# Patient Record
Sex: FEMALE | Race: BLACK OR AFRICAN AMERICAN | Employment: UNEMPLOYED | URBAN - METROPOLITAN AREA
[De-identification: names, ages, dates, MRNs, and addresses within clinical notes are randomized per-mention and may not be internally consistent; named-entity substitution may affect disease eponyms.]

---

## 2017-06-15 ENCOUNTER — HOSPITAL ENCOUNTER (OUTPATIENT)
Dept: PHYSICAL THERAPY | Age: 52
Discharge: HOME | End: 2017-06-15
Payer: MEDICAID

## 2017-06-15 PROCEDURE — 97162 PT EVAL MOD COMPLEX 30 MIN: CPT | Performed by: PHYSICAL THERAPIST

## 2017-06-15 PROCEDURE — 97110 THERAPEUTIC EXERCISES: CPT | Performed by: PHYSICAL THERAPIST

## 2017-06-15 NOTE — PROGRESS NOTES
PT DAILY TREATMENT NOTE     Patient Name: Oriana Covarrubias  Date:6/15/2017  : 1965  [x]  Patient  Verified  Payor: BLUE CROSS MEDICAID / Plan: [facility] [employer] HEALTHKEEPERS PLUS / Product Type: Managed Care Medicaid /    In time:8:00  Out time:9:00  Total Treatment Time (min): 60  Visit #: 1 of 12    Treatment Area: Bilateral shoulder pain [M25.511, M25.512]    SUBJECTIVE  Pain Level (0-10 scale): 6 left shoulder and hip, 3/10 right shoulder   Any medication changes, allergies to medications, adverse drug reactions, diagnosis change, or new procedure performed?: [x] No    [] Yes (see summary sheet for update)  Subjective functional status/changes:   [] No changes reported  I can not sleep at times if the shoulder and hip pain is acting up. It hurts to lie on that arm.      OBJECTIVE    Modality rationale: decrease inflammation, decrease pain and increase tissue extensibility to improve the patients ability to improve mobility    Min Type Additional Details    [] Estim:  []Unatt       []IFC  []Premod                        []Other:  []w/ice   []w/heat  Position:  Location:    [] Estim: []Att    []TENS instruct  []NMES                    []Other:  []w/US   []w/ice   []w/heat  Position:  Location:    []  Traction: [] Cervical       []Lumbar                       [] Prone          []Supine                       []Intermittent   []Continuous Lbs:  [] before manual  [] after manual    []  Ultrasound: []Continuous   [] Pulsed                           []1MHz   []3MHz W/cm2:  Location:    []  Iontophoresis with dexamethasone         Location: [] Take home patch   [] In clinic   10 []  Ice     [x]  heat  []  Ice massage  []  Laser   []  Anodyne Position: seated up incline Location: left shoulder     []  Laser with stim  []  Other:  Position:  Location:    []  Vasopneumatic Device Pressure:       [] lo [] med [] hi   Temperature: [] lo [] med [] hi   [x] Skin assessment post-treatment:  [x]intact [x]redness- no adverse reaction    []redness  adverse reaction:     40 min [x]Eval                  []Re-Eval       10 min Therapeutic Exercise:  [x] See flow sheet :HEP performed and issued this date for shoulders and hip. Rationale: increase ROM, increase strength, improve coordination, improve balance and increase proprioception to improve the patients ability to improve mobility              With   [x] TE   [] TA   [] neuro   [] other: Patient Education: [x] Review HEP    [x] Progressed/Changed HEP based on:   [] positioning   [] body mechanics   [] transfers   [] heat/ice application    [] other:      Other Objective/Functional Measures:  See POC      Pain Level (0-10 scale) post treatment: 2    ASSESSMENT/Changes in Function: see POC    Patient will continue to benefit from skilled PT services to modify and progress therapeutic interventions, address functional mobility deficits, address ROM deficits, address strength deficits, analyze and address soft tissue restrictions, analyze and cue movement patterns, analyze and modify body mechanics/ergonomics, assess and modify postural abnormalities and address imbalance/dizziness to attain remaining goals. []  See Plan of Care  []  See progress note/recertification  []  See Discharge Summary         Progress towards goals / Updated goals:  See POC    PLAN  [x]  Upgrade activities as tolerated     [x]  Continue plan of care  []  Update interventions per flow sheet       []  Discharge due to:_  [x]  Other:_FOTO performed for shoulders and hip.        Obey Chan PT 6/15/2017  9:00 AM    Future Appointments  Date Time Provider Noah Gregory   6/19/2017 10:30 AM Zac Fontenot [facility]   6/21/2017 8:30 AM Zac Fontenot [facility]   6/23/2017 10:30 AM Zac Po [facility]   6/26/2017 10:30 AM Zac Po, [facility]   6/28/2017 1:30 PM Obey Chan PT [facility]   6/30/2017 11:30 AM Bryon Newsome [facility]   7/3/2017 1:00 PM Shailesh Branch PT [facility]   7/5/2017 9:00 AM Chanel Thomas [facility]   7/7/2017 11:00 AM Chanel Thomas [facility]   7/10/2017 10:30 AM Chanel Thomas [facility]   7/12/2017 10:00 AM Chanel Thomas [facility]   7/14/2017 10:30 AM Dory Arnold PT [facility]

## 2017-06-15 NOTE — PROGRESS NOTES
[facility]  [address] [place], [address] [place]  Ph [phone]  Fx [phone]    Plan of Care/ Statement of Necessity for Physical Therapy Services    Patient name: Donnie Sunshine Start of Care: 6/15/2017   Referral source:  Elias Aguilar NP  : 1965    Medical Diagnosis: Bilateral shoulder pain [M25.511, M25.512], Right hip pain. Onset Date:chronic OA, several years, increased several months. Treatment Diagnosis: boby shoulder pain and decreased ROM, weakness. Right hip pain and difficult walking. Prior Hospitalization: see medical history Provider#: [id number]   Medications: Verified on Patient summary List    Comorbidities: HTN, Obesity, Arthritis, Parathyroid, CHF, Osteoporosis, Sleep apnea, GERD. Prior Level of Function: Pt I with all ADL's and IADL's. The Plan of Care and following information is based on the information from the initial evaluation. Assessment/ key information: Pt is a pleasant 45 yo AAF with c/c of boby shoulder and right hip pain. Pt has hx of moderate arthritis that is chronic and is managed with exercise and medication. Pt currently reported limited Left UE and Right HIP AROM with increased report of pain that limits her sleeping tolerance and ability to perform daily tasks. Pt states pain increases to 6-7/10 at worst and 4/10 at best with pain medication. Pt has limited Right shoulder elevation to 165 degrees and left to 135 degrees. Pt has 3+/5 left UE MMT and 4/5 right UE MMT. Pt has full right LE AROM with MMT grossly 4/5 in right hip due to pain. Pt has s/s consistent with arthritis as well as decreased soft and joint tissue mobility. Pt has moderate TTP to right hip and boby shoulder soft tissue with multiple trigger points. Pt will benefit from skilled PT services to address the findings and to improve ADL function and sleep quality.     Evaluation Complexity History MEDIUM  Complexity : 1-2 comorbidities / personal factors will impact the outcome/ POC ; Examination MEDIUM Complexity : 3 Standardized tests and measures addressing body structure, function, activity limitation and / or participation in recreation  ;Presentation MEDIUM Complexity : Evolving with changing characteristics  ; Clinical Decision Making HIGH Complexity : FOTO score of 1- 25   Overall Complexity Rating: MEDIUM  Problem List: pain affecting function, decrease ROM, decrease strength, edema affecting function, impaired gait/ balance, decrease ADL/ functional abilitiies, decrease activity tolerance, decrease flexibility/ joint mobility and decrease transfer abilities   Treatment Plan may include any combination of the following: Therapeutic exercise, Therapeutic activities, Neuromuscular re-education, Physical agent/modality, Gait/balance training, Manual therapy, Aquatic therapy, Patient education, Self Care training, Functional mobility training, Home safety training and Stair training  Patient / Family readiness to learn indicated by: asking questions, trying to perform skills and interest  Persons(s) to be included in education: patient (P)  Barriers to Learning/Limitations: None  Patient Goal (s): To get motion back and have less pain   Patient Self Reported Health Status: good  Rehabilitation Potential: good    Short Term Goals: To be accomplished in 2 weeks:   1) Pt will report > or = to 4/10 pain in boby UE  to improve quality of sleeping.    @ Worst 6-7/10    2) Pt will report > or = to 3/10 pain in Right hip pain to improve quality of sleeping.    @ Worst 6/10    3) Pt will be issued a HEP to allow for carryover b/w therapy sessions. @ Eval: HEP issued with cues for safety. Long Term Goals:  To be accomplished in 4 weeks:   1) Pt will report > or = to 0-1/10 pain in boby UE  to improve quality of sleeping.    @ Worst 6-7/10    2) Pt will report > or = to 0-1/10 pain in Right hip pain to improve quality of sleeping.    @ Worst 6/10    3) Pt will have 4+/5 strength in boby UE to allow for improved tolerance for ADL function. @ Eval: Right UE grossly 4/5, left UE grossly 3+-4-/5    4) Pt will have increased right hip strength to 5/5 to promote community ambulation sans difficulty. @ Eval: Right hip MMT 4/5   5) Pt will have improved FOTO score for shoulders by 10-15 points to show improved mobility .    @ Eval: 45/100   6) 4) Pt will have improved FOTO score for hip by 8-10 points to show improved mobility.    @ Eval: 55/100    Frequency / Duration: Patient to be seen 3 times per week for 4 weeks. Patient/ Caregiver education and instruction: Diagnosis, prognosis, self care, activity modification, brace/ splint application and exercises   [x]  Plan of care has been reviewed with YASEMIN Coronado, PT 6/15/2017 9:03 AM    ________________________________________________________________________    I certify that the above Therapy Services are being furnished while the patient is under my care. I agree with the treatment plan and certify that this therapy is necessary.     Physician's Signature:____________________  Date:____________Time: _________    Please sign and return to [facility]  [address] [place], [address]  Ph [phone]  Fx [phone]

## 2017-06-19 ENCOUNTER — HOSPITAL ENCOUNTER (OUTPATIENT)
Dept: PHYSICAL THERAPY | Age: 52
Discharge: HOME | End: 2017-06-19
Payer: MEDICAID

## 2017-06-19 NOTE — PROGRESS NOTES
PT DAILY TREATMENT NOTE - [facility]     Patient Name: Korey Olson  Date:2017  : 1965  [x]  Patient  Verified  Payor: BLUE CROSS MEDICAID / [facility] Montey Schirmer / Product Type: Managed Care Medicaid /    In time:1030  Out time:1110  Total Treatment Time (min): 40  Total Timed Codes (min): 40  1:1 Treatment Time (MC only): na   Visit #: 2 of 12    Treatment Area: Bilateral shoulder pain [M25.511, M25.512]    SUBJECTIVE  Pain Level (0-10 scale): 8 R UE,   Any medication changes, allergies to medications, adverse drug reactions, diagnosis change, or new procedure performed?: [x] No    [] Yes (see summary sheet for update)  Subjective functional status/changes:   [] No changes reported  Active with HEP, no changes in pain levels    OBJECTIVE    40 min Therapeutic Exercise:  [] See flow sheet :   Rationale: increase ROM, increase strength and improve coordination          With   [] TE   [] TA   [] neuro   [] other: Patient Education: [x] Review HEP    [] Progressed/Changed HEP based on:   [] positioning   [] body mechanics   [] transfers   [] heat/ice application    [] other:      Other Objective/Functional Measures:   Shoulder hiking with L UE during abd     Pain Level (0-10 scale) post treatment: 8 L SH, 4 R sh    ASSESSMENT/Changes in Function:   Showed good mobility with ROM exercises. No increase of current pain with TE. Patient will continue to benefit from skilled PT services to modify and progress therapeutic interventions, address functional mobility deficits, address ROM deficits and address strength deficits to attain remaining goals. [x]  See Plan of Care  []  See progress note/recertification  []  See Discharge Summary         Progress towards goals / Updated goals:  Short Term Goals:  To be accomplished in 2 weeks:  1) Pt will report > or = to 4/10 pain in boby UE to improve quality of sleeping.   @ Worst 6-7/10   Current: no changes    2) Pt will report > or = to 3/10 pain in Right hip pain to improve quality of sleeping.   @ Worst 6/10   Current: no changes    3) Pt will be issued a HEP to allow for carryover b/w therapy sessions. @ Eval: HEP issued with cues for safety.    Current: reports compliance    Long Term Goals: To be accomplished in 4 weeks:  1) Pt will report > or = to 0-1/10 pain in boby UE to improve quality of sleeping.   @ Worst 6-7/10   Current: no changes    2) Pt will report > or = to 0-1/10 pain in Right hip pain to improve quality of sleeping.   @ Worst 6/10   Current: no changes    3) Pt will have 4+/5 strength in boby UE to allow for improved tolerance for ADL function. @ Eval: Right UE grossly 4/5, left UE grossly 3+-4-/5   Current: no changes    4) Pt will have increased right hip strength to 5/5 to promote community ambulation sans difficulty.    @ Eval: Right hip MMT 4/5  Current: no changes    5) Pt will have improved FOTO score for shoulders by 10-15 points to show improved mobility .   @ Eval: 45/100  Current: no changes    6) 4) Pt will have improved FOTO score for hip by 8-10 points to show improved mobility.   @ Eval: 55/100  Current: no changes    PLAN  [x]  Upgrade activities as tolerated     [x]  Continue plan of care  []  Update interventions per flow sheet       []  Discharge due to:_  []  Other:_      Sally Cortez PTA 6/19/2017  10:43 AM    Future Appointments  Date Time Provider Noah Gregory   6/21/2017 8:30 AM Sally Cortez [facility]   6/23/2017 10:30 AM Sally Cortez PTA [facility]   6/26/2017 10:30 AM Sally Cortez PTA [facility]   6/28/2017 1:30 PM Marcella Manzano PT [facility]   6/30/2017 11:30 AM Marcella Manzano PT [facility]   7/3/2017 1:00 PM Marcella Manzano PT [facility]   7/5/2017 9:00 AM Sally Cortez [facility]   7/7/2017 11:00 AM Sally Cortez [facility]   7/10/2017 10:30 AM Sally Cortez [facility]   7/12/2017 10:00 AM Sally Cortez [facility]   7/14/2017 10:30 AM Emanuel Cedillo PT [facility]

## 2017-06-21 ENCOUNTER — HOSPITAL ENCOUNTER (OUTPATIENT)
Dept: PHYSICAL THERAPY | Age: 52
Discharge: HOME | End: 2017-06-21
Payer: MEDICAID

## 2017-06-21 PROCEDURE — 97110 THERAPEUTIC EXERCISES: CPT

## 2017-06-21 NOTE — PROGRESS NOTES
PT DAILY TREATMENT NOTE - [facility]     Patient Name: Shawn Atkinson  Date:2017  : 1965  [x]  Patient  Verified  Payor: BLUE CROSS MEDICAID / Plan: [facility] Tiffany Forman / Product Type: Managed Care Medicaid /    In time:825  Out time:915  Total Treatment Time (min): 50  Total Timed Codes (min): 50  1:1 Treatment Time (MC only): na   Visit #: 3 of 12    Treatment Area: Bilateral shoulder pain [M25.511, M25.512]    SUBJECTIVE  Pain Level (0-10 scale): 0 R, 8 L  Any medication changes, allergies to medications, adverse drug reactions, diagnosis change, or new procedure performed?: [x] No    [] Yes (see summary sheet for update)  Subjective functional status/changes:   [] No changes reported  Reports no change on pain when sleeping, \"only increases if Iay on my L side\"    OBJECTIVE      50 min Therapeutic Exercise:  [] See flow sheet :   Rationale: increase ROM, increase strength and improve coordination    With   [] TE   [] TA   [] neuro   [] other: Patient Education: [x] Review HEP    [] Progressed/Changed HEP based on:   [] positioning   [] body mechanics   [] transfers   [] heat/ice application    [] other:      Other Objective/Functional Measures:   No increase of pain with TE. Strength deficit greater L UE compared to R. Difficulty with resisted flexion       Pain Level (0-10 scale) post treatment: 8 L, 0 R Sh    ASSESSMENT/Changes in Function:   Min cuing for form, some compensatory movements with L UE flexion    Patient will continue to benefit from skilled PT services to modify and progress therapeutic interventions, address functional mobility deficits, address ROM deficits and address strength deficits to attain remaining goals. []  See Plan of Care  []  See progress note/recertification  []  See Discharge Summary         Progress towards goals / Updated goals:  Short Term Goals:  To be accomplished in 2 weeks:  1) Pt will report > or = to 4/10 pain in boby UE to improve quality of sleeping.   @ Worst 6-7/10   Current:0 R, 6-7 L     2) Pt will report > or = to 3/10 pain in Right hip pain to improve quality of sleeping.   @ Worst 6/10   Current: no changes     3) Pt will be issued a HEP to allow for carryover b/w therapy sessions. @ Eval: HEP issued with cues for safety.    Current: reports compliance     Long Term Goals: To be accomplished in 4 weeks:  1) Pt will report > or = to 0-1/10 pain in boby UE to improve quality of sleeping.   @ Worst 6-7/10   Current: no changes     2) Pt will report > or = to 0-1/10 pain in Right hip pain to improve quality of sleeping.   @ Worst 6/10   Current: no changes     3) Pt will have 4+/5 strength in boby UE to allow for improved tolerance for ADL function. @ Eval: Right UE grossly 4/5, left UE grossly 3+-4-/5   Current: no changes     4) Pt will have increased right hip strength to 5/5 to promote community ambulation sans difficulty.    @ Eval: Right hip MMT 4/5  Current: no changes     5) Pt will have improved FOTO score for shoulders by 10-15 points to show improved mobility .   @ Eval: 45/100  Current: no changes     6) 4) Pt will have improved FOTO score for hip by 8-10 points to show improved mobility.   @ Eval: 55/100  Current: no changes    PLAN  [x]  Upgrade activities as tolerated     []  Continue plan of care  []  Update interventions per flow sheet       []  Discharge due to:_  []  Other:_      Awa Kumar PTA 6/21/2017  8:49 AM    Future Appointments  Date Time Provider Noah Gregory   6/23/2017 10:30 AM Awa Kumar PTA [facility]   6/26/2017 10:30 AM Awa Kumar PTA [facility]   6/28/2017 1:30 PM Valentino Carson, PT [facility]   6/30/2017 11:30 AM Valentino Carson, PT [facility]   7/3/2017 1:00 PM Valentino Carson, PT [facility]   7/5/2017 9:00 AM Norval Bidding, [facility]   7/7/2017 11:00 AM Norval Bidding, [facility]   7/10/2017 10:30 AM Norval Bidding, [facility]   7/12/2017 10:00 AM Norval Bidding, PTA [facility]   7/14/2017 10:30 AM Scott Benitez PT [facility]

## 2017-06-23 ENCOUNTER — HOSPITAL ENCOUNTER (OUTPATIENT)
Dept: PHYSICAL THERAPY | Age: 52
Discharge: HOME | End: 2017-06-23
Payer: MEDICAID

## 2017-06-23 PROCEDURE — 97110 THERAPEUTIC EXERCISES: CPT

## 2017-06-23 NOTE — PROGRESS NOTES
PT DAILY TREATMENT NOTE - [facility]     Patient Name: Enrico Rivera  Date:2017  : 1965  [x]  Patient  Verified  Payor: BLUE CROSS MEDICAID / Plan: [facility] [employer]ERS [employer] / Product Type: Managed Care Medicaid /    In time:1030  Out time:1110  Total Treatment Time (min): 40  Total Timed Codes (min): 40  1:1 Treatment Time (MC only): na   Visit #: 4 of 12    Treatment Area: Bilateral shoulder pain [M25.511, M25.512]    SUBJECTIVE  Pain Level (0-10 scale): 6 L SH, 0 R  Any medication changes, allergies to medications, adverse drug reactions, diagnosis change, or new procedure performed?: [x] No    [] Yes (see summary sheet for update)  Subjective functional status/changes:   [] No changes reported  Reports improvement I symptoms, no hip or R sh pain, deficits remain in L SH    OBJECTIVE    40 min Therapeutic Exercise:  [] See flow sheet :   Rationale: increase ROM, increase strength and improve coordination          With   [] TE   [] TA   [] neuro   [] other: Patient Education: [x] Review HEP    [] Progressed/Changed HEP based on:   [] positioning   [] body mechanics   [] transfers   [] heat/ice application    [] other:      Other Objective/Functional Measures:   Decrease in L sh pain today. No hip pain. Pain Level (0-10 scale) post treatment: 6 L Sh     ASSESSMENT/Changes in Function:   No increase of shoulder pain with TE, compensatory movements at end range resisted flex on L UE    Patient will continue to benefit from skilled PT services to modify and progress therapeutic interventions, address functional mobility deficits, address ROM deficits and address strength deficits to attain remaining goals.      [x]  See Plan of Care  []  See progress note/recertification  []  See Discharge Summary         Progress towards goals / Updated goals:  Short Term Goals: To be accomplished in 2 weeks:  1) Pt will report > or = to 4/10 pain in boby UE to improve quality of sleeping.   @ Worst 6-7/10   Current:0 R, 6-7 L      2) Pt will report > or = to 3/10 pain in Right hip pain to improve quality of sleeping.   @ Worst 6/10   Current:0/10 c/o of ache      3) Pt will be issued a HEP to allow for carryover b/w therapy sessions. @ Eval: HEP issued with cues for safety.    Current: reports compliance      Long Term Goals: To be accomplished in 4 weeks:  1) Pt will report > or = to 0-1/10 pain in boby UE to improve quality of sleeping.   @ Worst 6-7/10   Current: no changes      2) Pt will report > or = to 0-1/10 pain in Right hip pain to improve quality of sleeping.   @ Worst 6/10   Current: :0/10 c/o of ache        3) Pt will have 4+/5 strength in boby UE to allow for improved tolerance for ADL function. @ Eval: Right UE grossly 4/5, left UE grossly 3+-4-/5   Current: progressing per TE, deficits in L UE      4) Pt will have increased right hip strength to 5/5 to promote community ambulation sans difficulty.    @ Eval: Right hip MMT 4/5  Current: no changes      5) Pt will have improved FOTO score for shoulders by 10-15 points to show improved mobility .   @ Eval: 45/100  Current: no changes      6) 4) Pt will have improved FOTO score for hip by 8-10 points to show improved mobility.   @ Eval: 55/100  Current: no changes    PLAN  [x]  Upgrade activities as tolerated     [x]  Continue plan of care  []  Update interventions per flow sheet       []  Discharge due to:_  []  Other:_      Chris Hopson PTA 6/23/2017  10:42 AM    Future Appointments  Date Time Provider Noah Gregory   6/26/2017 10:30 AM Chris Hopson PTA [facility]   6/28/2017 1:30 PM Vicky Soriano PT [facility]   6/30/2017 11:30 AM Vicky Soriano PT [facility]   7/3/2017 1:00 PM Vicky Soriano PT [facility]   7/5/2017 9:00 AM Chris Hopson PTA [facility]   7/7/2017 11:00 AM Chris Hopson PTA [facility]   7/10/2017 10:30 AM Chris Hopson PTA [facility]   7/12/2017 10:00 AM Chris Hopson PTA [facility]   7/14/2017 10:30 AM Darcie Al, PT [facility]

## 2017-06-26 ENCOUNTER — HOSPITAL ENCOUNTER (OUTPATIENT)
Dept: PHYSICAL THERAPY | Age: 52
Discharge: HOME | End: 2017-06-26
Payer: MEDICAID

## 2017-06-26 PROCEDURE — 97110 THERAPEUTIC EXERCISES: CPT

## 2017-06-26 NOTE — PROGRESS NOTES
PT DAILY TREATMENT NOTE - [facility]     Patient Name: Oriana Covarrubias  Date:2017  : 1965  [x]  Patient  Verified  Payor: BLUE CROSS MEDICAID / Plan: [facility] HEALTHKEEPERS PLUS / Product Type: Managed Care Medicaid /    In time:1030  Out time:1125  Total Treatment Time (min): 55  Total Timed Codes (min): 55  1:1 Treatment Time ( [address] only): na   Visit #: 5 of 12    Treatment Area: Bilateral shoulder pain [M25.511, M25.512]    SUBJECTIVE  Pain Level (0-10 scale): 4-5/10   Any medication changes, allergies to medications, adverse drug reactions, diagnosis change, or new procedure performed?: [x] No    [] Yes (see summary sheet for update)  Subjective functional status/changes:   [] No changes reported  Pain decreasing in L shoulder    OBJECTIVE    55 min Therapeutic Exercise:  [] See flow sheet :   Rationale: increase ROM, increase strength and improve coordination            With   [x] TE   [] TA   [] neuro   [] other: Patient Education: [x] Review HEP    [x] Progressed/Changed HEP based on: sh 6way [] positioning   [] body mechanics   [] transfers   [] heat/ice application    [] other:      Other Objective/Functional Measures:   57/100 sh  54/100 hip     Pain Level (0-10 scale) post treatment: 4-5 L sh    ASSESSMENT/Changes in Function:   Improved ability with sh 6way with TB. Decreased sh pain with movement. Patient will continue to benefit from skilled PT services to modify and progress therapeutic interventions, address functional mobility deficits, address ROM deficits and address strength deficits to attain remaining goals.      [x]  See Plan of Care  []  See progress note/recertification  []  See Discharge Summary         Progress towards goals / Updated goals:  Short Term Goals: To be accomplished in 2 weeks:  1) Pt will report > or = to 4/10 pain in boby UE to improve quality of sleeping.   @ Worst 6-7/10   Current:0 R, 6-7 L      2) Pt will report > or = to 3/10 pain in Right hip pain to improve quality of sleeping.   @ Worst 6/10   Current:0/10 c/o of ache      3) Pt will be issued a HEP to allow for carryover b/w therapy sessions. @ Eval: HEP issued with cues for safety.    Current: reports compliance      Long Term Goals: To be accomplished in 4 weeks:  1) Pt will report > or = to 0-1/10 pain in boby UE to improve quality of sleeping.   @ Worst 6-7/10   Current: no changes      2) Pt will report > or = to 0-1/10 pain in Right hip pain to improve quality of sleeping.   @ Worst 6/10   Current: :0/10 c/o of ache         3) Pt will have 4+/5 strength in boby UE to allow for improved tolerance for ADL function. @ Eval: Right UE grossly 4/5, left UE grossly 3+-4-/5   Current: progressing per TE, deficits in L UE      4) Pt will have increased right hip strength to 5/5 to promote community ambulation sans difficulty.    @ Eval: Right hip MMT 4/5  Current: no changes      5) Pt will have improved FOTO score for shoulders by 10-15 points to show improved mobility .   @ Eval: 45/100  Current: 57/100      6) 4) Pt will have improved FOTO score for hip by 8-10 points to show improved mobility.   @ Eval: 55/100  Current: 54/100    PLAN  [x]  Upgrade activities as tolerated     [x]  Continue plan of care  []  Update interventions per flow sheet       []  Discharge due to:_  []  Other:_      Jaylin Trotter PTA 6/26/2017  11:06 AM    Future Appointments  Date Time Provider Noah Gregory   6/28/2017 2:00 PM Elizabeth Patterson PT [facility]   6/30/2017 11:30 AM Elizabeth Patterson PT [facility]   7/3/2017 1:00 PM Elizabeth Patterson PT [facility]   7/5/2017 9:00 AM Jaylin Trotter PTA [facility]   7/7/2017 11:00 AM Jaylin Trotter PTA [facility]   7/10/2017 10:30 AM Jaylin Dyers, PTA [facility]   7/12/2017 10:00 AM Jaylin Trotter PTA [facility]   7/14/2017 10:30 AM Darcie Al PT [facility]

## 2017-06-28 ENCOUNTER — HOSPITAL ENCOUNTER (OUTPATIENT)
Dept: PHYSICAL THERAPY | Age: 52
End: 2017-06-28
Payer: MEDICAID

## 2017-06-30 ENCOUNTER — HOSPITAL ENCOUNTER (OUTPATIENT)
Dept: PHYSICAL THERAPY | Age: 52
Discharge: HOME | End: 2017-06-30
Payer: MEDICAID

## 2017-06-30 PROCEDURE — 97110 THERAPEUTIC EXERCISES: CPT

## 2017-06-30 NOTE — PROGRESS NOTES
PT DAILY TREATMENT NOTE - [facility]     Patient Name: Marie Leyva  Date:2017  : 1965  [x]  Patient  Verified  Payor: BLUE CROSS MEDICAID / Plan: [facility] [employer] HEALTHKEEPERS PLUS / Product Type: Managed Care Medicaid /    In time:1115  Out time:1200  Total Treatment Time (min): 45  Total Timed Codes (min): 45  1:1 Treatment Time ([facility] only): na   Visit #: 6 of 12    Treatment Area: Bilateral shoulder pain [M25.511, M25.512]    SUBJECTIVE  Pain Level (0-10 scale): 0  Any medication changes, allergies to medications, adverse drug reactions, diagnosis change, or new procedure performed?: [x] No    [] Yes (see summary sheet for update)  Subjective functional status/changes:   [] No changes reported  Reports being active with updated HEP, no pain in sh or hips    OBJECTIVE      45 min Therapeutic Exercise:  [] See flow sheet :   Rationale: increase ROM, increase strength and improve coordination           With   [] TE   [] TA   [] neuro   [] other: Patient Education: [x] Review HEP    [] Progressed/Changed HEP based on:   [] positioning   [] body mechanics   [] transfers   [] heat/ice application    [] other:      Other Objective/Functional Measures:   none     Pain Level (0-10 scale) post treatment: 0    ASSESSMENT/Changes in Function:   Increased ease with shoulder 6way, decreased compensation    Patient will continue to benefit from skilled PT services to modify and progress therapeutic interventions, address functional mobility deficits, address ROM deficits and address strength deficits to attain remaining goals.      [x]  See Plan of Care  []  See progress note/recertification  []  See Discharge Summary         Progress towards goals / Updated goals:  Short Term Goals: To be accomplished in 2 weeks:  1) Pt will report > or = to 4/10 pain in boby UE to improve quality of sleeping.   @ Worst 6-7/10   Current:0 R, 0/10 17      2) Pt will report > or = to 3/10 pain in Right hip pain to improve quality of sleeping.   @ Worst 6/10   Current:0/10 c/o of ache      3) Pt will be issued a HEP to allow for carryover b/w therapy sessions. @ Eval: HEP issued with cues for safety.    Current: reports compliance      Long Term Goals: To be accomplished in 4 weeks:  1) Pt will report > or = to 0-1/10 pain in boby UE to improve quality of sleeping.   @ Worst 6-7/10   Current: no changes      2) Pt will report > or = to 0-1/10 pain in Right hip pain to improve quality of sleeping.   @ Worst 6/10   Current: :0/10 c/o of ache          3) Pt will have 4+/5 strength in boby UE to allow for improved tolerance for ADL function. @ Eval: Right UE grossly 4/5, left UE grossly 3+-4-/5   Current: progressing per TE, deficits in L UE      4) Pt will have increased right hip strength to 5/5 to promote community ambulation sans difficulty.    @ Eval: Right hip MMT 4/5  Current: no changes      5) Pt will have improved FOTO score for shoulders by 10-15 points to show improved mobility .   @ Eval: 45/100  Current: 57/100      6) 4) Pt will have improved FOTO score for hip by 8-10 points to show improved mobility.   @ Eval: 55/100  Current: 54/100       PLAN  [x]  Upgrade activities as tolerated     [x]  Continue plan of care  []  Update interventions per flow sheet       []  Discharge due to:_  []  Other:_      Nicky Read PTA 6/30/2017  11:22 AM    Future Appointments  Date Time Provider Noah Gregory   6/30/2017 11:30 AM Nicky Read PTA [facility]   7/3/2017 1:00 PM Kermit Garcia PT [facility]   7/5/2017 9:00 AM Nicky Read PTA [facility]   7/7/2017 11:00 AM Nicky Read PTA [facility]   7/10/2017 10:30 AM Nicky Read [facility]   7/12/2017 10:00 AM Nicky Read PTA [facility]   7/14/2017 10:30 AM Karlee Yu PT [facility]

## 2017-07-03 ENCOUNTER — APPOINTMENT (OUTPATIENT)
Dept: PHYSICAL THERAPY | Age: 52
End: 2017-07-03
Payer: MEDICAID

## 2017-07-05 ENCOUNTER — HOSPITAL ENCOUNTER (OUTPATIENT)
Dept: PHYSICAL THERAPY | Age: 52
Discharge: HOME | End: 2017-07-05
Payer: MEDICAID

## 2017-07-05 PROCEDURE — 97110 THERAPEUTIC EXERCISES: CPT

## 2017-07-05 NOTE — PROGRESS NOTES
PT DAILY TREATMENT NOTE - [facility]     Patient Name: Eufemia Herrera  Date:2017  : 1965  [x]  Patient  Verified  Payor: BLUE CROSS MEDICAID / Plan: [facility] [employer] HEALTHKEEPERS PLUS / Product Type: Managed Care Medicaid /    In time:900  Out time:942  Total Treatment Time (min): 42  Total Timed Codes (min): 42  1:1 Treatment Time ([facility] only): na   Visit #: 7 of 12    Treatment Area: Bilateral shoulder pain [M25.511, M25.512]    SUBJECTIVE  Pain Level (0-10 scale): 0  Any medication changes, allergies to medications, adverse drug reactions, diagnosis change, or new procedure performed?: [x] No    [] Yes (see summary sheet for update)  Subjective functional status/changes:   [] No changes reported  Active with HEP, no pain, only c/o aching     OBJECTIVE      40 min Therapeutic Exercise:  [] See flow sheet :   Rationale: increase ROM, increase strength and improve coordination             With   [x] TE   [] TA   [] neuro   [] other: Patient Education: [x] Review HEP    [x] Progressed/Changed HEP based on: B LE strengthening  [] positioning   [] body mechanics   [] transfers   [] heat/ice application    [] other:      Other Objective/Functional Measures:   R UE MMT  5/5 all planes of motion  L UE MMT  ABD/Flex/ER 4+/5  IR 5/5     Pain Level (0-10 scale) post treatment: 0    ASSESSMENT/Changes in Function:   Progressing very well with alleviation of pain and improved L UE strength      Patient will continue to benefit from skilled PT services to modify and progress therapeutic interventions, address functional mobility deficits, address ROM deficits and address strength deficits to attain remaining goals.      [x]  See Plan of Care  []  See progress note/recertification  []  See Discharge Summary         Progress towards goals / Updated goals:  Short Term Goals: To be accomplished in 2 weeks:  1) Pt will report > or = to 4/10 pain in boby UE to improve quality of sleeping.   @ Worst 6-7/10 Current:0/10      2) Pt will report > or = to 3/10 pain in Right hip pain to improve quality of sleeping.   @ Worst 6/10   Current:0/10 c/o of ache      3) Pt will be issued a HEP to allow for carryover b/w therapy sessions. @ Eval: HEP issued with cues for safety.    Current: reports compliance      Long Term Goals: To be accomplished in 4 weeks:  1) Pt will report > or = to 0-1/10 pain in boby UE to improve quality of sleeping.   @ Worst 6-7/10   Current: only has pain if lying on L arm, otherwise 0/10      2) Pt will report > or = to 0-1/10 pain in Right hip pain to improve quality of sleeping.   @ Worst 6/10   Current: :0/10 c/o of ache          3) Pt will have 4+/5 strength in boby UE to allow for improved tolerance for ADL function. @ Eval: Right UE grossly 4/5, left UE grossly 3+-4-/5   Current: R UE MMT  5/5 all planes of motion  L UE MMT  ABD/Flex/ER 4+/5  IR 5/5         4) Pt will have increased right hip strength to 5/5 to promote community ambulation sans difficulty.    @ Eval: Right hip MMT 4/5  Current: no changes      5) Pt will have improved FOTO score for shoulders by 10-15 points to show improved mobility .   @ Eval: 45/100  Current: 57/100      6) 4) Pt will have improved FOTO score for hip by 8-10 points to show improved mobility.   @ Eval: 55/100  Current: 54/100    PLAN  [x]  Upgrade activities as tolerated     [x]  Continue plan of care  []  Update interventions per flow sheet       []  Discharge due to:_  []  Other:_      Alejandra Richmond PTA 7/5/2017  9:15 AM    Future Appointments  Date Time Provider Noah Gregory   7/7/2017 11:00 AM Alejandra Richmond [facility]   7/10/2017 10:30 AM Alejandra Richmond PTA [facility]   7/12/2017 10:00 AM Alejandra Richmond PTA [facility]   7/14/2017 10:30 AM Shelli Link, PT [facility]

## 2017-07-07 ENCOUNTER — HOSPITAL ENCOUNTER (OUTPATIENT)
Dept: PHYSICAL THERAPY | Age: 52
Discharge: HOME | End: 2017-07-07
Payer: MEDICAID

## 2017-07-07 PROCEDURE — 97110 THERAPEUTIC EXERCISES: CPT

## 2017-07-07 NOTE — PROGRESS NOTES
PT DAILY TREATMENT NOTE - [facility]     Patient Name: Harman Gorman  Date:2017  : 1965  [x]  Patient  Verified  Payor: BLUE CROSS MEDICAID / Plan: [facility] [employer] HEALTHKEEPERS PLUS / Product Type: Managed Care Medicaid /    In time:11  Out time:1138  Total Treatment Time (min): 38  Total Timed Codes (min): 38  1:1 Treatment Time ([facility] only): na   Visit #: 8 of 12    Treatment Area: Bilateral shoulder pain [M25.511, M25.512]    SUBJECTIVE  Pain Level (0-10 scale): 0  Any medication changes, allergies to medications, adverse drug reactions, diagnosis change, or new procedure performed?: [x] No    [] Yes (see summary sheet for update)  Subjective functional status/changes:   [] No changes reported  Feeling good with no pain. Active with HEP    OBJECTIVE    38 min Therapeutic Exercise:  [] See flow sheet :   Rationale: increase ROM, increase strength and improve coordination          With   [] TE   [] TA   [] neuro   [] other: Patient Education: [x] Review HEP    [] Progressed/Changed HEP based on:   [] positioning   [] body mechanics   [] transfers   [] heat/ice application    [] other:      Other Objective/Functional Measures:   R hip MMT  flex 4+/5  Abd/add 5/5  Ext 4+/5    L hip MMT  Flex 4+/5  Abd/add 5/5  Ext 4+/5       Pain Level (0-10 scale) post treatment: 0    ASSESSMENT/Changes in Function:   Good increase of RE hip strength above 4/5 in all planes of motion    Patient will continue to benefit from skilled PT services to modify and progress therapeutic interventions, address functional mobility deficits, address ROM deficits and address strength deficits to attain remaining goals.      [x]  See Plan of Care  []  See progress note/recertification  []  See Discharge Summary         Progress towards goals / Updated goals:  Short Term Goals: To be accomplished in 2 weeks:  1) Pt will report > or = to 4/10 pain in boby UE to improve quality of sleeping.   @ Worst 6-7/10   Current:0/10      2) Pt will report > or = to 3/10 pain in Right hip pain to improve quality of sleeping.   @ Worst 6/10   Current:0/10 c/o of ache      3) Pt will be issued a HEP to allow for carryover b/w therapy sessions. @ Eval: HEP issued with cues for safety.    Current: reports compliance      Long Term Goals: To be accomplished in 4 weeks:  1) Pt will report > or = to 0-1/10 pain in boby UE to improve quality of sleeping.   @ Worst 6-7/10   Current: only has pain if lying on L arm, otherwise 0/10      2) Pt will report > or = to 0-1/10 pain in Right hip pain to improve quality of sleeping.   @ Worst 6/10   Current: :0/10 c/o of ache          3) Pt will have 4+/5 strength in boby UE to allow for improved tolerance for ADL function. @ Eval: Right UE grossly 4/5, left UE grossly 3+-4-/5   Current:   R UE MMT  5/5 all planes of motion  L UE MMT  ABD/Flex/ER 4+/5  IR 5/5                      4) Pt will have increased right hip strength to 5/5 to promote community ambulation sans difficulty.    @ Eval: Right hip MMT 4/5  Current:   R hip MMT  flex 4+/5  Abd/add 5/5  Ext 4+/5    L hip MMT  Flex 4+/5  Abd/add 5/5  Ext 4+/5        5) Pt will have improved FOTO score for shoulders by 10-15 points to show improved mobility .   @ Eval: 45/100  Current: 57/100      6) 4) Pt will have improved FOTO score for hip by 8-10 points to show improved mobility.   @ Eval: 55/100  Current: 54/100       PLAN  [x]  Upgrade activities as tolerated     [x]  Continue plan of care  []  Update interventions per flow sheet       []  Discharge due to:_  []  Other:_      Ekaterina Bloom PTA 7/7/2017  11:01 AM    Future Appointments  Date Time Provider Noah Gregory   7/10/2017 10:30 AM Ekaterina Bloom PTA [facility]   7/12/2017 10:00 AM Ekaterina Bloom PTA [facility]   7/14/2017 10:30 AM Etelvina Mcfarland PT [facility]

## 2017-07-10 ENCOUNTER — HOSPITAL ENCOUNTER (OUTPATIENT)
Dept: PHYSICAL THERAPY | Age: 52
Discharge: HOME | End: 2017-07-10
Payer: MEDICAID

## 2017-07-10 PROCEDURE — 97110 THERAPEUTIC EXERCISES: CPT

## 2017-07-10 NOTE — PROGRESS NOTES
PT DAILY TREATMENT NOTE - [facility]     Patient Name: Robert Raphael  Date:7/10/2017  : 1965  [x]  Patient  Verified  Payor: BLUE CROSS MEDICAID / Plan: [facility] [employer] HEALTHKEEPERS PLUS / Product Type: Managed Care Medicaid /    In time:1030  Out time:1118  Total Treatment Time (min): 48  Total Timed Codes (min): 48  1:1 Treatment Time ([facility] only): na   Visit #: 9 of 12    Treatment Area: Bilateral shoulder pain [M25.511, M25.512]    SUBJECTIVE  Pain Level (0-10 scale): 0  Any medication changes, allergies to medications, adverse drug reactions, diagnosis change, or new procedure performed?: [x] No    [] Yes (see summary sheet for update)  Subjective functional status/changes:   [] No changes reported  Feeling good with no shoulder or hip pain    OBJECTIVE        48 min Therapeutic Exercise:  [] See flow sheet :   Rationale: increase ROM, increase strength and improve coordination            With   [] TE   [] TA   [] neuro   [] other: Patient Education: [x] Review HEP    [] Progressed/Changed HEP based on:   [] positioning   [] body mechanics   [] transfers   [] heat/ice application    [] other:      Other Objective/Functional Measures:    See below. Pain Level (0-10 scale) post treatment: 0    ASSESSMENT/Changes in Function:   Pt has met strength goal for Shoulder    Patient will continue to benefit from skilled PT services to modify and progress therapeutic interventions, address functional mobility deficits, address ROM deficits and address strength deficits to attain remaining goals.      [x]  See Plan of Care  []  See progress note/recertification  []  See Discharge Summary         Progress towards goals / Updated goals:  Short Term Goals: To be accomplished in 2 weeks:  1) Pt will report > or = to 4/10 pain in boby UE to improve quality of sleeping.   @ Worst 6-7/10   Current:0/10, MET      2) Pt will report > or = to 3/10 pain in Right hip pain to improve quality of sleeping.   @ Worst 6/10 Current:0/10 c/o of ache, MET      3) Pt will be issued a HEP to allow for carryover b/w therapy sessions. @ Eval: HEP issued with cues for safety.   Jayla Fournier: reports compliance      Long Term Goals: To be accomplished in 4 weeks:  1) Pt will report > or = to 0-1/10 pain in boby UE to improve quality of sleeping.   @ Worst 6-7/10   Current: only has pain if lying on L arm, otherwise 0/10, MET      2) Pt will report > or = to 0-1/10 pain in Right hip pain to improve quality of sleeping.   @ Worst 6/10   Current: :0/10 c/o of ache, MET          3) Pt will have 4+/5 strength in boby UE to allow for improved tolerance for ADL function. @ Eval: Right UE grossly 4/5, left UE grossly 3+-4-/5   Current:   R UE MMT  5/5 all planes of motion  L UE MMT  ABD/Flex/ER 4+/5  IR 5/5                       4) Pt will have increased right hip strength to 5/5 to promote community ambulation sans difficulty.    @ Eval: Right hip MMT 4/5  Current:   R hip MMT  flex 4+/5  Abd/add 5/5  Ext 4+/5     L hip MMT  Flex 4+/5  Abd/add 5/5  Ext 4+/5         5) Pt will have improved FOTO score for shoulders by 10-15 points to show improved mobility .   @ Eval: 45/100  Current: 57/100      6) 4) Pt will have improved FOTO score for hip by 8-10 points to show improved mobility.   @ Eval: 55/100  Current: 54/100          PLAN  [x]  Upgrade activities as tolerated     [x]  Continue plan of care  []  Update interventions per flow sheet       []  Discharge due to:_  []  Other:_      Ekaterina Bloom PTA 7/10/2017  10:44 AM    Future Appointments  Date Time Provider Noah Gregory   7/12/2017 10:00 AM Ekaterina Bloom PTA [facility]   7/14/2017 10:30 AM Etelvina Mcfraland PT [facility]

## 2017-07-12 ENCOUNTER — HOSPITAL ENCOUNTER (OUTPATIENT)
Dept: PHYSICAL THERAPY | Age: 52
Discharge: HOME | End: 2017-07-12
Payer: MEDICAID

## 2017-07-12 PROCEDURE — 97164 PT RE-EVAL EST PLAN CARE: CPT | Performed by: PHYSICAL THERAPIST

## 2017-07-12 PROCEDURE — 97110 THERAPEUTIC EXERCISES: CPT | Performed by: PHYSICAL THERAPIST

## 2017-07-12 NOTE — PROGRESS NOTES
PT DAILY TREATMENT NOTE     Patient Name: Abdon Fischer  Date:2017  : 1965  [x]  Patient  Verified  Payor: BLUE CROSS MEDICAID / Plan: [facility] [employer] [place] / Product Type: Managed Care Medicaid /    In time:10:03  Out time:11:10  Total Treatment Time (min): 55  Visit #: 10 of 12    Treatment Area: Bilateral shoulder pain [M25.511, M25.512]    SUBJECTIVE  Pain Level (0-10 scale): 0-3  Any medication changes, allergies to medications, adverse drug reactions, diagnosis change, or new procedure performed?: [x] No    [] Yes (see summary sheet for update)  Subjective functional status/changes:   [] No changes reported  I have improved ~ 70-80%. I am not 100% yet.  The worst pain is about 3/10     OBJECTIVE    Modality rationale: none   Min Type Additional Details    [] Estim:  []Unatt       []IFC  []Premod                        []Other:  []w/ice   []w/heat  Position:  Location:    [] Estim: []Att    []TENS instruct  []NMES                    []Other:  []w/US   []w/ice   []w/heat  Position:  Location:    []  Traction: [] Cervical       []Lumbar                       [] Prone          []Supine                       []Intermittent   []Continuous Lbs:  [] before manual  [] after manual    []  Ultrasound: []Continuous   [] Pulsed                           []1MHz   []3MHz W/cm2:  Location:    []  Iontophoresis with dexamethasone         Location: [] Take home patch   [] In clinic    []  Ice     []  heat  []  Ice massage  []  Laser   []  Anodyne Position:  Location:    []  Laser with stim  []  Other:  Position:  Location:    []  Vasopneumatic Device Pressure:       [] lo [] med [] hi   Temperature: [] lo [] med [] hi   [] Skin assessment post-treatment:  []intact []redness- no adverse reaction    []redness  adverse reaction:     10 min []Eval                  [x]Re-Eval       45 min Therapeutic Exercise:  [x] See flow sheet :   Rationale: increase ROM, increase strength and improve coordination to improve the patients ability to improve mobility           With   [x] TE   [] TA   [] neuro   [] other: Patient Education: [x] Review HEP    [x] Progressed/Changed HEP based on:   [] positioning   [] body mechanics   [] transfers   [] heat/ice application    [] other:      Other Objective/Functional Measures:   Angel Shoulder AROM WFL with MMT 4+-5-/5 with flexion and ABD all others 5/5  Right hip AROM WFL with MMT 4+/5    FOTO: 69/100 hip      73/100 shoulder     Pain Level (0-10 scale) post treatment: 2-3/10 post assessment aft 5 min rest 0/10 on departure. ASSESSMENT/Changes in Function: see PN     Patient will continue to benefit from skilled PT services to modify and progress therapeutic interventions, address functional mobility deficits, address ROM deficits, address strength deficits, analyze and address soft tissue restrictions, analyze and cue movement patterns, analyze and modify body mechanics/ergonomics and assess and modify postural abnormalities to attain remaining goals. []  See Plan of Care  [x]  See progress note/recertification  []  See Discharge Summary         Progress towards goals / Updated goals:  See PN    PLAN  [x]  Upgrade activities as tolerated     [x]  Continue plan of care  []  Update interventions per flow sheet       []  Discharge due to:_  [x]  Other:_continue 2 x 4 weeks.         Lyn Acuna PT 7/12/2017  11:15 AM    Future Appointments  Date Time Provider Noah Gregory   7/14/2017 10:30 AM Etelvina Mcfarland, PT [facility]

## 2017-07-14 ENCOUNTER — HOSPITAL ENCOUNTER (OUTPATIENT)
Dept: PHYSICAL THERAPY | Age: 52
Discharge: HOME | End: 2017-07-14
Payer: MEDICAID

## 2017-07-14 PROCEDURE — 97110 THERAPEUTIC EXERCISES: CPT | Performed by: PHYSICAL THERAPIST

## 2017-07-14 NOTE — PROGRESS NOTES
PT DAILY TREATMENT NOTE     Patient Name: Al Banks  Date:2017  : 1965  [x]  Patient  Verified  Payor: BLUE CROSS MEDICAID / Plan: [facility] Adilene Salazar / Product Type: Managed Care Medicaid /    In IJKK:[id number]  Out time:1120  Total Treatment Time (min): 47  Visit #: 11 of 18    Treatment Area: Bilateral shoulder pain [M25.511, M25.512]    SUBJECTIVE  Pain Level (0-10 scale): 0  Any medication changes, allergies to medications, adverse drug reactions, diagnosis change, or new procedure performed?: [x] No    [] Yes (see summary sheet for update)  Subjective functional status/changes:   [] No changes reported  Pt reports progress   Pt states now able to lift left arm without using her opp arm to lift , now able to also put arm behind back which she could do prior ,   Pt able to lift dishes up and put them into cupboards with more reps/endurnace and less pain   Pt able to lift 5month old baby and carry him short distances   For hip pt doing HEP uses blue tband for 4 way and now able to lie on right hip with no pain     OBJECTIVE      45 min Therapeutic Exercise:  [x] See flow sheet :   Rationale: increase ROM, increase strength and improve coordination to improve the patients ability to improve mobility           With   [] TE   [] TA   [] neuro   [] other: Patient Education: [x] Review HEP    [] Progressed/Changed HEP based on:   [] positioning   [] body mechanics   [] transfers   [] heat/ice application    [] other:      Other Objective/Functional Measures: note accessory motion with Tband ex especially FE on left decr control , pt with forward shoulder posture      Pain Level (0-10 scale) post treatment: 0    ASSESSMENT/Changes in Function: cues for shoulders down and back , and quatlity of motion , decr accessory motions especially on the left with FE with tband     Patient will continue to benefit from skilled PT services to modify and progress therapeutic interventions, address functional mobility deficits, address ROM deficits, address strength deficits, analyze and address soft tissue restrictions, analyze and cue movement patterns, analyze and modify body mechanics/ergonomics and assess and modify postural abnormalities to attain remaining goals. [x]  See Plan of Care  []  See progress note/recertification  []  See Discharge Summary         Progress towards goals / Updated goals:      Long Term Goals: To be accomplished in 4 weeks:  1) Pt will report > or = to 0-1/10 pain in boby UE to improve quality of sleeping.   @ Worst 6-7/10   Current: not met, 0-3/10 at worst for both areas.       2) Pt will report > or = to 0-1/10 pain in Right hip pain to improve quality of sleeping.   @ Worst 6/10   Current:not met, 0-3/10 c/o of ache                    3) Pt will have increased right hip strength to 5/5 to promote community ambulation sans difficulty. @ Eval: Right hip MMT 4/5  Current: not met,   R hip MMT  flex 4+/5  Abd/add 5/5  Ext 4+/5      L hip MMT  Flex 4+/5  Abd/add 5/5  Ext 4+/5    4) Revised goal. PT will have Left UE MMT grossly 5/5 throughout to promote return to PLOF.                          @ re-assess: Right UE MMT 5/5 all planes of motion  Left UE MMT ABD/Flex/ER 4+/5 IR 5/5     PLAN  [x]  Upgrade activities as tolerated     [x]  Continue plan of care  []  Update interventions per flow sheet       []  Discharge due to:_  []  Other:_      Fahad Larios, PT 7/14/2017  11:01 AM    No future appointments.

## 2017-07-17 ENCOUNTER — HOSPITAL ENCOUNTER (OUTPATIENT)
Dept: PHYSICAL THERAPY | Age: 52
Discharge: HOME | End: 2017-07-17
Payer: MEDICAID

## 2017-07-17 PROCEDURE — 97110 THERAPEUTIC EXERCISES: CPT

## 2017-07-17 NOTE — PROGRESS NOTES
PT DAILY TREATMENT NOTE - [facility]     Patient Name: Al Banks  Date:2017  : 1965  [x]  Patient  Verified  Payor: BLUE CROSS MEDICAID / Plan: [facility] Adilene Salazar / Product Type: Managed Care Medicaid /    In time:107  Out time:145  Total Treatment Time (min): 38  Total Timed Codes (min): 38  1:1 Treatment Time (MC only): na   Visit #: 12 of 18    Treatment Area: Bilateral shoulder pain [M25.511, M25.512]    SUBJECTIVE  Pain Level (0-10 scale): 0  Any medication changes, allergies to medications, adverse drug reactions, diagnosis change, or new procedure performed?: [x] No    [] Yes (see summary sheet for update)  Subjective functional status/changes:   [] No changes reported  Feeling good with no symptoms. Some residual weakness in L UE remains    OBJECTIVE      35 min Therapeutic Exercise:  [] See flow sheet :   Rationale: increase ROM, increase strength and improve coordination      With   [] TE   [] TA   [] neuro   [] other: Patient Education: [x] Review HEP    [] Progressed/Changed HEP based on:   [] positioning   [] body mechanics   [] transfers   [] heat/ice application    [] other:      Other Objective/Functional Measures:   none     Pain Level (0-10 scale) post treatment: 0    ASSESSMENT/Changes in Function:   No increased pain with advanced TE. Patient will continue to benefit from skilled PT services to modify and progress therapeutic interventions, address functional mobility deficits, address ROM deficits and address strength deficits to attain remaining goals.      [x]  See Plan of Care  []  See progress note/recertification  []  See Discharge Summary         Progress towards goals / Updated goals:  Long Term Goals: To be accomplished in 4 weeks:  1) Pt will report > or = to 0-1/10 pain in boby UE to improve quality of sleeping.   @ Worst 6-7/10   Current: not met, 0-3/10 at worst for both areas.       2) Pt will report > or = to 0-1/10 pain in Right hip pain to improve quality of sleeping.   @ Worst 6/10   Current:not met, 0-3/10 c/o of ache         3) Pt will have increased right hip strength to 5/5 to promote community ambulation sans difficulty. @ Eval: Right hip MMT 4/5  Current: not met,   R hip MMT  flex 4+/5  Abd/add 5/5  Ext 4+/5      L hip MMT  Flex 4+/5  Abd/add 5/5  Ext 4+/5     4) Revised goal. PT will have Left UE MMT grossly 5/5 throughout to promote return to PLOF.                           @ re-assess: Right UE MMT 5/5 all planes of motion  Left UE MMT ABD/Flex/ER 4+/5 IR 5/5     PLAN  [x]  Upgrade activities as tolerated     [x]  Continue plan of care  []  Update interventions per flow sheet       []  Discharge due to:_  []  Other:_      Thanh Feldman PTA 7/17/2017  1:57 PM    Future Appointments  Date Time Provider Noah Gregory   7/24/2017 9:30 AM Thanh Feldman PTA [facility]   7/26/2017 9:00 AM Thanh Feldman PTA [facility]   7/31/2017 10:30 AM Mushtaq Barber PT [facility]   8/2/2017 11:30 AM Mushtaq Barber PT [facility]   8/7/2017 8:30 AM Mushtaq Barber PT [facility]   8/9/2017 11:30 AM Mushtaq Barber PT [facility]

## 2017-07-24 ENCOUNTER — HOSPITAL ENCOUNTER (OUTPATIENT)
Dept: PHYSICAL THERAPY | Age: 52
Discharge: HOME | End: 2017-07-24
Payer: MEDICAID

## 2017-07-24 PROCEDURE — 97110 THERAPEUTIC EXERCISES: CPT

## 2017-07-24 NOTE — PROGRESS NOTES
PT DAILY TREATMENT NOTE - [facility]     Patient Name: Farheen Whitehead  Date:2017  : 1965  [x]  Patient  Verified  Payor: BLUE CROSS MEDICAID / Plan: [facility] [employer] [facility] Adilene Rife / Product Type: Managed Care Medicaid /    In time:930  Out time:1010  Total Treatment Time (min): 40  Total Timed Codes (min): 49  1:1 Treatment Time (MC only): na  Visit #: 13 of 18    Treatment Area: Bilateral shoulder pain [M25.511, M25.512]    SUBJECTIVE  Pain Level (0-10 scale): 0  Any medication changes, allergies to medications, adverse drug reactions, diagnosis change, or new procedure performed?: [x] No    [] Yes (see summary sheet for update)  Subjective functional status/changes:   [] No changes reported  No chanel n with  ADL's, reports no functional limitations at this time    OBJECTIVE        40 min Therapeutic Exercise:  [] See flow sheet :   Rationale: increase ROM, increase strength, improve coordination and improve balance           With   [] TE   [] TA   [] neuro   [] other: Patient Education: [x] Review HEP    [] Progressed/Changed HEP based on:   [] positioning   [] body mechanics   [] transfers   [] heat/ice application    [] other:      Other Objective/Functional Measures:   none     Pain Level (0-10 scale) post treatment: 0    ASSESSMENT/Changes in Function:   Pt presents with no functional imitations at this time. Patient will continue to benefit from skilled PT services to modify and progress therapeutic interventions, address functional mobility deficits, address ROM deficits and analyze and address soft tissue restrictions to attain remaining goals.      [x]  See Plan of Care  []  See progress note/recertification  []  See Discharge Summary         Progress towards goals / Updated goals:  Long Term Goals: To be accomplished in 4 weeks:  1) Pt will report > or = to 0-1/10 pain in boby UE to improve quality of sleeping.   @ Worst 6-7/10   Current: not met, 0-3/10 at worst for both areas.       2) Pt will report > or = to 0-1/10 pain in Right hip pain to improve quality of sleeping.   @ Worst 6/10   Current:not met, 0-3/10 c/o of ache         3) Pt will have increased right hip strength to 5/5 to promote community ambulation sans difficulty. @ Eval: Right hip MMT 4/5  Current: not met,   R hip MMT  flex 4+/5  Abd/add 5/5  Ext 4+/5      L hip MMT  Flex 4+/5  Abd/add 5/5  Ext 4+/5      4) Revised goal. PT will have Left UE MMT grossly 5/5 throughout to promote return to PLOF.                           @ re-assess: Right UE MMT 5/5 all planes of motion  Left UE MMT ABD/Flex/ER 4+/5 IR 5/5     PLAN  [x]  Upgrade activities as tolerated     [x]  Continue plan of care  []  Update interventions per flow sheet       []  Discharge due to:_  []  Other:_      Bismark Calderon PTA 7/24/2017  10:26 AM    Future Appointments  Date Time Provider Noah Gregory   7/26/2017 9:00 AM Bismark Calderon PTA [facility]   7/31/2017 10:30 AM Carley Nageotte, PT [facility]   8/2/2017 11:30 AM Carley Nageotte, PT [facility]   8/7/2017 8:30 AM Carley Nageotte, PT [facility]   8/9/2017 11:30 AM Carley Nageotte, PT [facility]

## 2017-07-26 ENCOUNTER — HOSPITAL ENCOUNTER (OUTPATIENT)
Dept: PHYSICAL THERAPY | Age: 52
Discharge: HOME | End: 2017-07-26
Payer: MEDICAID

## 2017-07-26 PROCEDURE — 97110 THERAPEUTIC EXERCISES: CPT

## 2017-07-26 NOTE — PROGRESS NOTES
PT DAILY TREATMENT NOTE - [facility]     Patient Name: Daja Doherty  Date:2017  : 1965  [x]  Patient  Verified  Payor: BLUE CROSS MEDICAID / Plan: [facility] CROSS Akhil Riddles / Product Type: Managed Care Medicaid /    In time:903  Out time:950  Total Treatment Time (min): 47  Total Timed Codes (min): 47  1:1 Treatment Time ( only): na   Visit #: 14 of 18    Treatment Area: Bilateral shoulder pain [M25.511, M25.512]    SUBJECTIVE  Pain Level (0-10 scale): 0  Any medication changes, allergies to medications, adverse drug reactions, diagnosis change, or new procedure performed?: [x] No    [] Yes (see summary sheet for update)  Subjective functional status/changes:     [x] No changes reported      OBJECTIVE    47 min Therapeutic Exercise:  [] See flow sheet :   Rationale: increase ROM, increase strength and improve coordination    With   [] TE   [] TA   [] neuro   [] other: Patient Education: [x] Review HEP    [] Progressed/Changed HEP based on:   [] positioning   [] body mechanics   [] transfers   [] heat/ice application    [] other:      Other Objective/Functional Measures:   none    Pain Level (0-10 scale) post treatment: 0    ASSESSMENT/Changes in Function:   No functional limitations at this time. R LE strength equal to L LE    Patient will continue to benefit from skilled PT services to modify and progress therapeutic interventions, address functional mobility deficits, address ROM deficits and address strength deficits to attain remaining goals.      [x]  See Plan of Care  []  See progress note/recertification  []  See Discharge Summary         Progress towards goals / Updated goals:  Long Term Goals: To be accomplished in 4 weeks:  1) Pt will report > or = to 0-1/10 pain in boby UE to improve quality of sleeping.   @ Worst 6-7/10   Current: not met, 0-1/10 at worst for both areas.       2) Pt will report > or = to 0-1/10 pain in Right hip pain to improve quality of sleeping.   @ Worst 6/10   Current: MET 0-1/10       3) Pt will have increased right hip strength to 5/5 to promote community ambulation sans difficulty. @ Eval: Right hip MMT 4/5  Current: not met,   R hip MMT  flex 4+/5  Abd/add 5/5  Ext 4+/5      L hip MMT  Flex 4+/5  Abd/add 5/5  Ext 4+/5      4) Revised goal. PT will have Left UE MMT grossly 5/5 throughout to promote return to PLOF.                           @ re-assess: Right UE MMT 5/5 all planes of motion  Left UE MMT ABD/Flex/ER 4+/5 IR 5/5     PLAN  [x]  Upgrade activities as tolerated     [x]  Continue plan of care  []  Update interventions per flow sheet       []  Discharge due to:_  []  Other:_      Miguel Nunez, YASEMIN 7/26/2017  9:09 AM    Future Appointments  Date Time Provider Noah Gregory   7/31/2017 10:30 AM Jarrell Schneider PT [facility]   8/2/2017 11:30 AM Jarrell Schneider PT [facility]   8/7/2017 8:30 AM Jarrell Schneider PT [facility]   8/9/2017 11:30 AM Jarrell Schneider PT [facility]

## 2017-07-31 ENCOUNTER — HOSPITAL ENCOUNTER (OUTPATIENT)
Dept: PHYSICAL THERAPY | Age: 52
Discharge: HOME | End: 2017-07-31
Payer: MEDICAID

## 2017-07-31 PROCEDURE — 97110 THERAPEUTIC EXERCISES: CPT | Performed by: PHYSICAL THERAPIST

## 2017-07-31 NOTE — PROGRESS NOTES
PT DAILY TREATMENT NOTE     Patient Name: Deloris Riedel  Date:2017  : 1965  [x]  Patient  Verified  Payor: BLUE CROSS MEDICAID / Plan: [facility] [employer] / Product Type: Managed Care Medicaid /    In time:1037  Out time:1116  Total Treatment Time (min): 39  Visit #: 15 of 18    Treatment Area: Bilateral shoulder pain [M25.511, M25.512]    SUBJECTIVE  Pain Level (0-10 scale): 0 shoulders and hip , right knee sore 6/10   Any medication changes, allergies to medications, adverse drug reactions, diagnosis change, or new procedure performed?: [x] No    [] Yes (see summary sheet for update)  Subjective functional status/changes:   [] No changes reported  Pt noting more endurance with activity , such as putting groceries away ,also easier to get up from a lower chair     OBJECTIVE     39 min Therapeutic Exercise:  [x] See flow sheet :   Rationale: increase ROM, increase strength and improve coordination to improve the patients ability to improve mobility           With   [] TE   [] TA   [] neuro   [] other: Patient Education: [x] Review HEP    [] Progressed/Changed HEP based on:   [] positioning   [] body mechanics   [] transfers   [] heat/ice application    [] other:      Other Objective/Functional Measures: FOTO :  75    Pain Level (0-10 scale) post treatment: 0    ASSESSMENT/Changes in Function: pt tolerating progression increase resist and weight with ex , increase hold with squat , cues with posture     Patient will continue to benefit from skilled PT services to modify and progress therapeutic interventions, address functional mobility deficits, address ROM deficits, address strength deficits, analyze and address soft tissue restrictions, analyze and cue movement patterns, analyze and modify body mechanics/ergonomics and assess and modify postural abnormalities to attain remaining goals.      [x]  See Plan of Care  []  See progress note/recertification  []  See Discharge Summary         Progress towards goals / Updated goals:  Long Term Goals: To be accomplished in 4 weeks:  1) Pt will report > or = to 0-1/10 pain in boby UE to improve quality of sleeping.   @ Worst 6-7/10   Current: not met, 0-2/10 at worst for both areas.       2) Pt will report > or = to 0-1/10 pain in Right hip pain to improve quality of sleeping.   @ Worst 6/10   Current: MET 0-1/10       3) Pt will have increased right hip strength to 5/5 to promote community ambulation sans difficulty. @ Eval: Right hip MMT 4/5  Current: not met,   R hip MMT  flex 4+/5  Abd/add 5/5  Ext 4+/5      L hip MMT  Flex 4+/5  Abd/add 5/5  Ext 4+/5      4) Revised goal. PT will have Left UE MMT grossly 5/5 throughout to promote return to PLOF.                           @ re-assess: Right UE MMT 5/5 all planes of motion  Left UE MMT ABD/Flex/ER 4+/5 IR 5/5     PLAN  [x]  Upgrade activities as tolerated     [x]  Continue plan of care  []  Update interventions per flow sheet       []  Discharge due to:_  []  Other:_      Pleasant Payment, PT 7/31/2017  10:47 AM    Future Appointments  Date Time Provider Naoh Gregory   8/2/2017 11:30 AM Pleasant Payment, PT [facility]   8/7/2017 8:30 AM Pleasant Payment, PT [facility]   8/9/2017 11:30 AM Pleasant Payment, PT [facility]

## 2017-08-02 ENCOUNTER — HOSPITAL ENCOUNTER (OUTPATIENT)
Dept: PHYSICAL THERAPY | Age: 52
Discharge: HOME | End: 2017-08-02
Payer: MEDICAID

## 2017-08-02 PROCEDURE — 97110 THERAPEUTIC EXERCISES: CPT | Performed by: PHYSICAL THERAPIST

## 2017-08-02 NOTE — PROGRESS NOTES
PT DAILY TREATMENT NOTE 12    Patient Name: Harsha Cotto  Date:2017  : 1965  [x]  Patient  Verified  Payor: BLUE CROSS MEDICAID / Plan: [facility] CROSS Ta Jones / Product Type: Managed Care Medicaid /    In time:11:30  Out time: 12:20  Total Treatment Time (min): 50  Visit #: 16 of 18    Treatment Area: Bilateral shoulder pain [M25.511, M25.512]    SUBJECTIVE  Pain Level (0-10 scale): 0  Any medication changes, allergies to medications, adverse drug reactions, diagnosis change, or new procedure performed?: [x] No    [] Yes (see summary sheet for update)  Subjective functional status/changes:   [] No changes reported  Overall function improving. OBJECTIVE    Modality rationale: decrease inflammation, decrease pain and increase tissue extensibility to improve the patients ability to improve mobility    Min Type Additional Details    [] Estim:  []Unatt       []IFC  []Premod                        []Other:  []w/ice   []w/heat  Position:  Location:    [] Estim: []Att    []TENS instruct  []NMES                    []Other:  []w/US   []w/ice   []w/heat  Position:  Location:    []  Traction: [] Cervical       []Lumbar                       [] Prone          []Supine                       []Intermittent   []Continuous Lbs:  [] before manual  [] after manual    []  Ultrasound: []Continuous   [] Pulsed                           []1MHz   []3MHz W/cm2:  Location:    []  Iontophoresis with dexamethasone         Location: [] Take home patch   [] In clinic    []  Ice     []  heat  []  Ice massage  []  Laser   []  Anodyne Position:  Location:    []  Laser with stim  []  Other:  Position:  Location:    []  Vasopneumatic Device Pressure:       [] lo [] med [] hi   Temperature: [] lo [] med [] hi   [] Skin assessment post-treatment:  []intact []redness- no adverse reaction    []redness  adverse reaction:       50 min Therapeutic Exercise:  [x] See flow sheet :UE and LE.     Rationale: increase ROM, increase strength, improve coordination, improve balance and increase proprioception to improve the patients ability to improve mobility           With   [x] TE   [] TA   [] neuro   [] other: Patient Education: [x] Review HEP    [x] Progressed/Changed HEP based on:   [] positioning   [] body mechanics   [] transfers   [] heat/ice application    [] other: reviewed d/c and gym progression. Other Objective/Functional Measures: Ronald UE AROM WFL, Left shoulder       Pain Level (0-10 scale) post treatment: 0    ASSESSMENT/Changes in Function: pt progressing towards d/c with HEP. Pt still reports UE pain and left shoulder weakness, however motion and strength allows for tolerance with ADL's. Pt is I with HEP this date. Patient will continue to benefit from skilled PT services to modify and progress therapeutic interventions, address functional mobility deficits, address ROM deficits, address strength deficits, analyze and address soft tissue restrictions, analyze and cue movement patterns, analyze and modify body mechanics/ergonomics, assess and modify postural abnormalities, address imbalance/dizziness and instruct in home and community integration to attain remaining goals. []  See Plan of Care  []  See progress note/recertification  []  See Discharge Summary         Progress towards goals / Updated goals:  Long Term Goals: To be accomplished in 4 weeks:  1) Pt will report > or = to 0-1/10 pain in ronald UE to improve quality of sleeping.   @ Worst 6-7/10   Current: met, 0/10  No pain        2) Pt will report > or = to 0-1/10 pain in Right hip pain to improve quality of sleeping.   @ Worst 6/10   Current: MET 0-1/10       3) Pt will have increased right hip strength to 5/5 to promote community ambulation sans difficulty.    @ Eval: Right hip MMT 4/5  Current: not met   R hip MMT  flex 4+/5  Abd/add 5/5  Ext 4+/5     L hip MMT  Flex 4+/5  Abd/add 5/5  Ext 4+/5      4) Revised goal. PT will have Left UE MMT grossly 5/5 throughout to promote return to PLOF.   @ Re-assess: Right UE MMT 5/5 all planes of motion  Left UE MMT ABD/Flex/ER 4+/5 IR 5/5        PLAN  [x]  Upgrade activities as tolerated     [x]  Continue plan of care  []  Update interventions per flow sheet       []  Discharge due to:_  []  Other:_      Sagar Zhong PT 8/2/2017  11:40 AM    Future Appointments  Date Time Provider Noah Gregory   8/7/2017 8:30 AM Lea Lopez, PT [facility]   8/9/2017 11:30 AM Lea Lopez PT [facility]

## 2017-08-07 ENCOUNTER — HOSPITAL ENCOUNTER (OUTPATIENT)
Dept: PHYSICAL THERAPY | Age: 52
Discharge: HOME | End: 2017-08-07
Payer: MEDICAID

## 2017-08-07 PROCEDURE — 97110 THERAPEUTIC EXERCISES: CPT | Performed by: PHYSICAL THERAPIST

## 2017-08-07 NOTE — PROGRESS NOTES
PT DAILY TREATMENT NOTE     Patient Name: Michelle Ballesteros  Date:2017  : 1965  [x]  Patient  Verified  Payor: BLUE CROSS MEDICAID / Plan: [facility] [employer] PLUS / Product Type: Managed Care Medicaid /    In [id number]  Out OAQI:[id number]  Total Treatment Time (min): 38  Visit #: 17 of 18    Treatment Area: Bilateral shoulder pain [M25.511, M25.512]    SUBJECTIVE  Pain Level (0-10 scale): 0  Any medication changes, allergies to medications, adverse drug reactions, diagnosis change, or new procedure performed?: [x] No    [] Yes (see summary sheet for update)  Subjective functional status/changes:   [] No changes reported  Pt helped uncle move so performed a lot of lifting no pain , pt feels she is 75% better in shoulders since starting therapy , right hip also feeling better now able to lie on it without pain     OBJECTIVE     38 min Therapeutic Exercise:  [x] See flow sheet :   Rationale: increase ROM, increase strength and improve coordination to improve the patients ability to improve mobility           With   [] TE   [] TA   [] neuro   [] other: Patient Education: [x] Review HEP    [] Progressed/Changed HEP based on:   [] positioning   [] body mechanics   [] transfers   [] heat/ice application    [] other:      Other Objective/Functional Measures: none     Pain Level (0-10 scale) post treatment: 0    ASSESSMENT/Changes in Function:tolerated increase resist with star ex, and wall wash , min cues for posture and scapula down and back   Positioning with ex     Patient will continue to benefit from skilled PT services to modify and progress therapeutic interventions, address functional mobility deficits, address ROM deficits, address strength deficits, analyze and address soft tissue restrictions, analyze and cue movement patterns, analyze and modify body mechanics/ergonomics and assess and modify postural abnormalities to attain remaining goals.      [x]  See Plan of Care  []  See progress note/recertification  []  See Discharge Summary         Progress towards goals / Updated goals:  Long Term Goals: To be accomplished in 4 weeks:  1) Pt will report > or = to 0-1/10 pain in boby UE to improve quality of sleeping.   @ Worst 6-7/10   Current: met, 0/10  No pain        2) Pt will report > or = to 0-1/10 pain in Right hip pain to improve quality of sleeping.   @ Worst 6/10   Current: MET 0-1/10       3) Pt will have increased right hip strength to 5/5 to promote community ambulation sans difficulty.    @ Eval: Right hip MMT 4/5  Current: not met   R hip MMT  flex 4+/5  Abd/add 5/5  Ext 4+/5      L hip MMT  Flex 4+/5  Abd/add 5/5  Ext 4+/5      4) Revised goal. PT will have Left UE MMT grossly 5/5 throughout to promote return to PLOF.   @ Re-assess: Right UE MMT 5/5 all planes of motion  Left UE MMT ABD/Flex/ER 4+/5 IR 5/5        PLAN  [x]  Upgrade activities as tolerated     [x]  Continue plan of care  []  Update interventions per flow sheet       []  Discharge due to:_  []  Other:_      Lexy Shelley, PT 8/7/2017  8:59 AM    Future Appointments  Date Time Provider Noah Gregory   8/9/2017 11:30 AM Lexy Shelley, PT [facility]

## 2017-08-09 ENCOUNTER — HOSPITAL ENCOUNTER (OUTPATIENT)
Dept: PHYSICAL THERAPY | Age: 52
Discharge: HOME | End: 2017-08-09
Payer: MEDICAID

## 2017-08-09 PROCEDURE — 97110 THERAPEUTIC EXERCISES: CPT | Performed by: PHYSICAL THERAPIST

## 2017-08-09 NOTE — PROGRESS NOTES
[facility]  [address] [facility] [address] [address], [address] [facility] [place]  Ph [phone]  Fx [phone]    Physical Therapy Discharge Summary    Patient name: Brian James Start of Care: 6/15/2017   Referral source: [facility], Not On File, MD : 1965   Medical/Treatment Diagnosis: Bilateral shoulder pain [M25.511, M25.512] Onset Date:[place]     Prior Hospitalization: see medical history Provider#: [id number]   Medications: Verified on Patient Summary List    Comorbidities: HTN, Obesity, Arthritis, Parathyroid, CHF, Osteoporosis, Sleep apnea, GERD.    Prior Level of Function: Pt I with all ADL's and IADL's.      Visits from Start of Care: 18    Missed Visits: 1    Reporting Period: 6/15/2017 to 2017    Summary of Care: Pt has met 90% of her goals and will continue to maintain and improved strength and endurance for extremities via recent registration to a local gym. Pt is also I with her HEP. Pt has reported feeling improved ~ 90% since starting PT. FOTO score improved from 45 at intake to d/c 82/100. Pt denies any current pain at this time and is in agreement for D/C. Please advise. Long Term Goals: To be accomplished in 4 weeks:  1) Pt will report > or = to 0-1/10 pain in boby UE to improve quality of sleeping.   @ Worst 6-7/10   Current: met, 0/10  No pain        2) Pt will report > or = to 0-1/10 pain in Right hip pain to improve quality of sleeping.   @ Worst 6/10   Current: MET 0-1/10       3) Pt will have increased right hip strength to 5/5 to promote community ambulation sans difficulty. @ Eval: Right hip MMT 4/5  Current: Boby LE MMT grossly 5/5       4) Revised goal. PT will have Left UE MMT grossly 5/5 throughout to promote return to PLOF.   @ Re-assess:   Right UE MMT 5/5 all planes of motion  Left UE MMT ABD/Flex/ER 4+/5 IR 5/5   Current: Boby UE grossly 5 to 5-/5  With left shoulder flexion more involved to not sustain for > 5 sec.      ASSESSMENT/RECOMMENDATIONS:  [x]Discontinue therapy: [x]Patient has reached or is progressing toward set goals      []Patient is non-compliant or has abdicated      []Due to lack of appreciable progress towards set [address] Harjit Poe, PT 8/9/2017 11:28 AM

## 2017-08-09 NOTE — PROGRESS NOTES
PT DAILY TREATMENT NOTE     Patient Name: Harsha Cotto  Date:2017  : 1965  [x]  Patient  Verified  Payor: BLUE CROSS MEDICAID / Plan: [facility] [employer]US / Product Type: Managed Care Medicaid /    In time:11:00  Out time:11;30  Total Treatment Time (min): 30  Visit #: 18 of 18    Treatment Area: Bilateral shoulder pain [M25.511, M25.512]    SUBJECTIVE  Pain Level (0-10 scale): 2  Any medication changes, allergies to medications, adverse drug reactions, diagnosis change, or new procedure performed?: [x] No    [] Yes (see summary sheet for update)  Subjective functional status/changes:   [] No changes reported  I feel 90% improved.      OBJECTIVE    Modality rationale:     Min Type Additional Details    [] Estim:  []Unatt       []IFC  []Premod                        []Other:  []w/ice   []w/heat  Position:  Location:    [] Estim: []Att    []TENS instruct  []NMES                    []Other:  []w/US   []w/ice   []w/heat  Position:  Location:    []  Traction: [] Cervical       []Lumbar                       [] Prone          []Supine                       []Intermittent   []Continuous Lbs:  [] before manual  [] after manual    []  Ultrasound: []Continuous   [] Pulsed                           []1MHz   []3MHz W/cm2:  Location:    []  Iontophoresis with dexamethasone         Location: [] Take home patch   [] In clinic    []  Ice     []  heat  []  Ice massage  []  Laser   []  Anodyne Position:  Location:    []  Laser with stim  []  Other:  Position:  Location:    []  Vasopneumatic Device Pressure:       [] lo [] med [] hi   Temperature: [] lo [] med [] hi   [] Skin assessment post-treatment:  []intact []redness- no adverse reaction    []redness  adverse reaction:     30 min Therapeutic Exercise:  [x] See flow sheet :   Rationale: increase ROM, increase strength, improve coordination, improve balance and increase proprioception to improve the patients ability to improve mobility With   [x] TE   [] TA   [] neuro   [] other: Patient Education: [x] Review HEP    [x] Progressed/Changed HEP based on:   [] positioning   [] body mechanics   [] transfers   [] heat/ice application    [] other:      Other Objective/Functional Measures:  See D/C note. Pain Level (0-10 scale) post treatment: 0    ASSESSMENT/Changes in Function: See D/C note. []  See Plan of Care  []  See progress note/recertification  [x]  See Discharge Summary             PLAN  []  Upgrade activities as tolerated     []  Continue plan of care  []  Update interventions per flow sheet       [x]  Discharge due to:_goals met,   []  Other:_      Kaci Akbar, PT 8/9/2017  11:28 AM    No future appointments.

## 2018-02-09 ENCOUNTER — HOSPITAL ENCOUNTER (EMERGENCY)
Age: 53
Discharge: HOME | End: 2018-02-09
Attending: EMERGENCY MEDICINE
Payer: MEDICAID

## 2018-02-09 ENCOUNTER — APPOINTMENT (OUTPATIENT)
Dept: GENERAL RADIOLOGY | Age: 53
End: 2018-02-09
Attending: PHYSICIAN ASSISTANT
Payer: MEDICAID

## 2018-02-09 VITALS
SYSTOLIC BLOOD PRESSURE: 153 MMHG | HEIGHT: 68 IN | DIASTOLIC BLOOD PRESSURE: 88 MMHG | RESPIRATION RATE: 16 BRPM | OXYGEN SATURATION: 100 % | HEART RATE: 75 BPM | TEMPERATURE: 98.3 F | BODY MASS INDEX: 38.65 KG/M2 | WEIGHT: 255 LBS

## 2018-02-09 DIAGNOSIS — M51.36 DEGENERATIVE DISC DISEASE, LUMBAR: ICD-10-CM

## 2018-02-09 DIAGNOSIS — M47.812 SPONDYLOSIS OF CERVICAL REGION WITHOUT MYELOPATHY OR RADICULOPATHY: ICD-10-CM

## 2018-02-09 DIAGNOSIS — S13.4XXA WHIPLASH INJURIES, INITIAL ENCOUNTER: Primary | ICD-10-CM
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
## 2018-02-09 DIAGNOSIS — M62.838 MUSCLE SPASMS OF NECK: ICD-10-CM

## 2018-02-09 PROCEDURE — 72040 X-RAY EXAM NECK SPINE 2-3 VW: CPT

## 2018-02-09 PROCEDURE — 99281 EMR DPT VST MAYX REQ PHY/QHP: CPT

## 2018-02-09 PROCEDURE — 72100 X-RAY EXAM L-S SPINE 2/3 VWS: CPT

## 2018-02-09 RX ORDER — LOVASTATIN 20 MG/1
20 TABLET ORAL
COMMUNITY

## 2018-02-09 RX ORDER — PANTOPRAZOLE SODIUM 40 MG/1
40 TABLET, DELAYED RELEASE ORAL DAILY
COMMUNITY

## 2018-02-09 RX ORDER — MONTELUKAST SODIUM 10 MG/1
10 TABLET ORAL DAILY
COMMUNITY

## 2018-02-09 RX ORDER — SULFASALAZINE 500 MG/1
1000 TABLET ORAL 4 TIMES DAILY
COMMUNITY
End: 2021-10-03

## 2018-02-09 RX ORDER — FUROSEMIDE 20 MG/1
20 TABLET ORAL DAILY
COMMUNITY

## 2018-02-09 RX ORDER — HYDROXYCHLOROQUINE SULFATE 200 MG/1
200 TABLET, FILM COATED ORAL DAILY
COMMUNITY

## 2018-02-09 RX ORDER — CARVEDILOL 12.5 MG/1
12.5 TABLET ORAL 2 TIMES DAILY WITH MEALS
COMMUNITY

## 2018-02-09 RX ORDER — CYCLOBENZAPRINE HCL 5 MG
5 TABLET ORAL
Qty: 20 TAB | Refills: 0 | Status: SHIPPED | OUTPATIENT
Start: 2018-02-09

## 2018-02-09 RX ORDER — AMLODIPINE BESYLATE 5 MG/1
5 TABLET ORAL DAILY
COMMUNITY

## 2018-02-09 RX ORDER — PREDNISONE 5 MG/1
5 TABLET ORAL
COMMUNITY
End: 2021-10-03

## 2018-02-09 RX ORDER — IBUPROFEN 800 MG/1
800 TABLET ORAL
Qty: 20 TAB | Refills: 0 | Status: SHIPPED | OUTPATIENT
Start: 2018-02-09 | End: 2018-02-16

## 2018-02-09 RX ORDER — LANOLIN ALCOHOL/MO/W.PET/CERES
1000 CREAM (GRAM) TOPICAL DAILY
COMMUNITY

## 2018-02-09 RX ORDER — POTASSIUM CHLORIDE 750 MG/1
10 CAPSULE, EXTENDED RELEASE ORAL 2 TIMES DAILY
COMMUNITY
End: 2021-10-03

## 2018-02-09 RX ORDER — LEFLUNOMIDE 20 MG/1
20 TABLET ORAL DAILY
COMMUNITY

## 2018-02-09 NOTE — DISCHARGE INSTRUCTIONS
Neck Strain: Care Instructions  Your Care Instructions    You have strained the muscles and ligaments in your neck. A sudden, awkward movement can strain the neck. This often occurs with falls or car accidents or during certain sports. Everyday activities like working on a computer or sleeping can also cause neck strain if they force you to hold your neck in an awkward position for a long time. It is common for neck pain to get worse for a day or two after an injury, but it should start to feel better after that. You may have more pain and stiffness for several days before it gets better. This is expected. It may take a few weeks or longer for it to heal completely. Good home treatment can help you get better faster and avoid future neck problems. Follow-up care is a key part of your treatment and safety. Be sure to make and go to all appointments, and call your doctor if you are having problems. It's also a good idea to know your test results and keep a list of the medicines you take. How can you care for yourself at home? · If you were given a neck brace (cervical collar) to limit neck motion, wear it as instructed for as many days as your doctor tells you to. Do not wear it longer than you were told to. Wearing a brace for too long can make neck stiffness worse and weaken the neck muscles. · You can try using heat or ice to see if it helps. ¨ Try using a heating pad on a low or medium setting for 15 to 20 minutes every 2 to 3 hours. Try a warm shower in place of one session with the heating pad. You can also buy single-use heat wraps that last up to 8 hours. ¨ You can also try an ice pack for 10 to 15 minutes every 2 to 3 hours. · Take pain medicines exactly as directed. ¨ If the doctor gave you a prescription medicine for pain, take it as prescribed. ¨ If you are not taking a prescription pain medicine, ask your doctor if you can take an over-the-counter medicine.   · Gently rub the area to relieve pain and help with blood flow. Do not massage the area if it hurts to do so. · Do not do anything that makes the pain worse. Take it easy for a couple of days. You can do your usual activities if they do not hurt your neck or put it at risk for more stress or injury. · Try sleeping on a special neck pillow. Place it under your neck, not under your head. Placing a tightly rolled-up towel under your neck while you sleep will also work. If you use a neck pillow or rolled towel, do not use your regular pillow at the same time. · To prevent future neck pain, do exercises to stretch and strengthen your neck and back. Learn how to use good posture, safe lifting techniques, and proper body mechanics. When should you call for help? Call 911 anytime you think you may need emergency care. For example, call if:  ? · You are unable to move an arm or a leg at all. ?Call your doctor now or seek immediate medical care if:  ? · You have new or worse symptoms in your arms, legs, chest, belly, or buttocks. Symptoms may include:  ¨ Numbness or tingling. ¨ Weakness. ¨ Pain. ? · You lose bladder or bowel control. ? Watch closely for changes in your health, and be sure to contact your doctor if:  ? · You are not getting better as expected. Where can you learn more? Go to http://annmarie-leda.info/. Enter M253 in the search box to learn more about \"Neck Strain: Care Instructions. \"  Current as of: March 21, 2017  Content Version: 11.4  © 6987-3810 [employer]. Care instructions adapted under license by [employer] (which disclaims liability or warranty for this information). If you have questions about a medical condition or this instruction, always ask your healthcare professional. [place] [address] any warranty or liability for your use of this information.          Cervical Spondylosis: Care Instructions  Your Care Instructions    Cervical spondylosis is a type of arthritis of the neck. It can happen as people get older. It may be caused by bone spurs or other problems. You may have neck pain and stiffness. Sometimes the space around the spinal cord narrows. When this happens, it is called spinal stenosis. Spinal stenosis can cause pain, numbness, or weakness in the arms, legs, feet, and rear end (buttocks). It can also cause loss of bowel and bladder control. You can treat some of your symptoms with over-the-counter pain medicine. But if you have spinal stenosis with severe symptoms, you may need surgery. Follow-up care is a key part of your treatment and safety. Be sure to make and go to all appointments, and call your doctor if you are having problems. It's also a good idea to know your test results and keep a list of the medicines you take. How can you care for yourself at home? · Take anti-inflammatory medicines to reduce neck pain. These include ibuprofen (Advil, Motrin) and naproxen (Aleve). Be safe with medicines. Read and follow all instructions on the label. · Follow your doctor's recommendation about activity. He or she may tell you not to do sports or activities that could injure your neck. When should you call for help? Call 911 anytime you think you may need emergency care. For example, call if:  ? · You are unable to move an arm or a leg at all. ?Call your doctor now or seek immediate medical care if:  ? · You have new or worse symptoms in your arms, legs, belly, or buttocks. Symptoms may include:  ¨ Numbness or tingling. ¨ Weakness. ¨ Pain. ? · You lose bladder or bowel control. ? Watch closely for changes in your health, and be sure to contact your doctor if:  ? · You do not get better as expected. Where can you learn more? Go to http://annmarie-leda.info/. Enter X853 in the search box to learn more about \"Cervical Spondylosis: Care Instructions. \"  Current as of: March 21, 2017  Content Version: 11.4  © 1194-0591 [employer]. Care instructions adapted under license by [employer] (which disclaims liability or warranty for this information). If you have questions about a medical condition or this instruction, always ask your healthcare professional. [address]  any warranty or liability for your use of this information. Neck Strain or Sprain: Rehab Exercises  Your Care Instructions  Here are some examples of typical rehabilitation exercises for your condition. Start each exercise slowly. Ease off the exercise if you start to have pain. Your doctor or physical therapist will tell you when you can start these exercises and which ones will work best for you. How to do the exercises  Neck rotation    1. Sit in a firm chair, or stand up straight. 2. Keeping your chin level, turn your head to the right, and hold for 15 to 30 seconds. 3. Turn your head to the left and hold for 15 to 30 seconds. 4. Repeat 2 to 4 times to each side. Neck stretches    1. Look straight ahead, and tip your right ear to your right shoulder. Do not let your left shoulder rise up as you tip your head to the right. 2. Hold for 15 to 30 seconds. 3. Tilt your head to the left. Do not let your right shoulder rise up as you tip your head to the left. 4. Hold for 15 to 30 seconds. 5. Repeat 2 to 4 times to each side. Forward neck flexion    1. Sit in a firm chair, or stand up straight. 2. Bend your head forward. 3. Hold for 15 to 30 seconds. 4. Repeat 2 to 4 times. Lateral (side) bend strengthening    1. With your right hand, place your first two fingers on your right temple. 2. Start to bend your head to the side while using gentle pressure from your fingers to keep your head from bending. 3. Hold for about 6 seconds. 4. Repeat 8 to 12 times. 5. Switch hands and repeat the same exercise on your left side. Forward bend strengthening    1.  Place your first two fingers of either hand on your forehead. 2. Start to bend your head forward while using gentle pressure from your fingers to keep your head from bending. 3. Hold for about 6 seconds. 4. Repeat 8 to 12 times. Neutral position strengthening    1. Using one hand, place your fingertips on the back of your head at the top of your neck. 2. Start to bend your head backward while using gentle pressure from your fingers to keep your head from bending. 3. Hold for about 6 seconds. 4. Repeat 8 to 12 times. Chin tuck    1. Lie on the floor with a rolled-up towel under your neck. Your head should be touching the floor. 2. Slowly bring your chin toward your chest.  3. Hold for a count of 6, and then relax for up to 10 seconds. 4. Repeat 8 to 12 times. Follow-up care is a key part of your treatment and safety. Be sure to make and go to all appointments, and call your doctor if you are having problems. It's also a good idea to know your test results and keep a list of the medicines you take. Where can you learn more? Go to http://annmarie-leda.info/. Enter M679 in the search box to learn more about \"Neck Strain or Sprain: Rehab Exercises. \"  Current as of: March 21, 2017  Content Version: 11.4  © 3117-3648 Healthwise, Incorporated. Care instructions adapted under license by [employer] (which disclaims liability or warranty for this information). If you have questions about a medical condition or this instruction, always ask your healthcare professional. [place] [address] any warranty or liability for your use of this information.
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
## 2021-04-14 ENCOUNTER — HOSPITAL ENCOUNTER (EMERGENCY)
Age: 56
Discharge: HOME | End: 2021-04-14
Attending: EMERGENCY MEDICINE
Payer: MEDICAID

## 2021-04-14 ENCOUNTER — APPOINTMENT (OUTPATIENT)
Dept: CT IMAGING | Age: 56
End: 2021-04-14
Attending: EMERGENCY MEDICINE
Payer: MEDICAID

## 2021-04-14 ENCOUNTER — APPOINTMENT (OUTPATIENT)
Dept: GENERAL RADIOLOGY | Age: 56
End: 2021-04-14
Attending: EMERGENCY MEDICINE
Payer: MEDICAID

## 2021-04-14 VITALS
OXYGEN SATURATION: 97 % | WEIGHT: 288 LBS | TEMPERATURE: 97.7 F | SYSTOLIC BLOOD PRESSURE: 151 MMHG | HEART RATE: 79 BPM | DIASTOLIC BLOOD PRESSURE: 86 MMHG | HEIGHT: 69 IN | BODY MASS INDEX: 42.65 KG/M2 | RESPIRATION RATE: 18 BRPM

## 2021-04-14 DIAGNOSIS — I50.9 ACUTE ON CHRONIC CONGESTIVE HEART FAILURE, UNSPECIFIED HEART FAILURE TYPE (HCC): Primary | ICD-10-CM

## 2021-04-14 DIAGNOSIS — D64.9 CHRONIC ANEMIA: ICD-10-CM

## 2021-04-14 LAB
ALBUMIN SERPL-MCNC: 4 G/DL (ref 3.4–5)
ALBUMIN/GLOB SERPL: 1.1 {RATIO} (ref 0.8–1.7)
ALP SERPL-CCNC: 159 U/L (ref 45–117)
ALT SERPL-CCNC: 23 U/L (ref 13–56)
ANION GAP SERPL CALC-SCNC: 4 MMOL/L (ref 3–18)
APTT PPP: 28.4 SEC (ref 23–36.4)
AST SERPL-CCNC: 22 U/L (ref 10–38)
ATRIAL RATE: 77 BPM
BASOPHILS # BLD: 0 K/UL (ref 0–0.1)
BASOPHILS NFR BLD: 0 % (ref 0–2)
BILIRUB SERPL-MCNC: 0.6 MG/DL (ref 0.2–1)
BNP SERPL-MCNC: 138 PG/ML (ref 0–900)
BUN SERPL-MCNC: 18 MG/DL (ref 7–18)
BUN/CREAT SERPL: 10 (ref 12–20)
CALCIUM SERPL-MCNC: 9.3 MG/DL (ref 8.5–10.1)
CALCULATED P AXIS, ECG09: 49 DEGREES
CALCULATED R AXIS, ECG10: 16 DEGREES
CALCULATED T AXIS, ECG11: 58 DEGREES
CHLORIDE SERPL-SCNC: 110 MMOL/L (ref 100–111)
CK MB CFR SERPL CALC: 0.5 % (ref 0–4)
CK MB SERPL-MCNC: 1.1 NG/ML (ref 5–25)
CK SERPL-CCNC: 219 U/L (ref 26–192)
CO2 SERPL-SCNC: 28 MMOL/L (ref 21–32)
CREAT SERPL-MCNC: 1.74 MG/DL (ref 0.6–1.3)
D DIMER PPP FEU-MCNC: 0.98 UG/ML(FEU)
DIAGNOSIS, 93000: NORMAL
DIFFERENTIAL METHOD BLD: ABNORMAL
EOSINOPHIL # BLD: 0.2 K/UL (ref 0–0.4)
EOSINOPHIL NFR BLD: 3 % (ref 0–5)
ERYTHROCYTE [DISTWIDTH] IN BLOOD BY AUTOMATED COUNT: 14.6 % (ref 11.6–14.5)
GLOBULIN SER CALC-MCNC: 3.6 G/DL (ref 2–4)
GLUCOSE SERPL-MCNC: 90 MG/DL (ref 74–99)
HCT VFR BLD AUTO: 29.6 % (ref 35–45)
HGB BLD-MCNC: 9.4 G/DL (ref 12–16)
INR PPP: 1.3 (ref 0.8–1.2)
LYMPHOCYTES # BLD: 1.3 K/UL (ref 0.9–3.6)
LYMPHOCYTES NFR BLD: 23 % (ref 21–52)
MCH RBC QN AUTO: 28.6 PG (ref 24–34)
MCHC RBC AUTO-ENTMCNC: 31.8 G/DL (ref 31–37)
MCV RBC AUTO: 90 FL (ref 74–97)
MONOCYTES # BLD: 0.6 K/UL (ref 0.05–1.2)
MONOCYTES NFR BLD: 10 % (ref 3–10)
NEUTS SEG # BLD: 3.6 K/UL (ref 1.8–8)
NEUTS SEG NFR BLD: 63 % (ref 40–73)
P-R INTERVAL, ECG05: 134 MS
PLATELET # BLD AUTO: 248 K/UL (ref 135–420)
PMV BLD AUTO: 11.9 FL (ref 9.2–11.8)
POTASSIUM SERPL-SCNC: 3.6 MMOL/L (ref 3.5–5.5)
PROT SERPL-MCNC: 7.6 G/DL (ref 6.4–8.2)
PROTHROMBIN TIME: 15.8 SEC (ref 11.5–15.2)
Q-T INTERVAL, ECG07: 352 MS
QRS DURATION, ECG06: 90 MS
QTC CALCULATION (BEZET), ECG08: 398 MS
RBC # BLD AUTO: 3.29 M/UL (ref 4.2–5.3)
SODIUM SERPL-SCNC: 142 MMOL/L (ref 136–145)
TROPONIN I SERPL-MCNC: <0.02 NG/ML (ref 0–0.04)
VENTRICULAR RATE, ECG03: 77 BPM
WBC # BLD AUTO: 5.7 K/UL (ref 4.6–13.2)

## 2021-04-14 PROCEDURE — 85379 FIBRIN DEGRADATION QUANT: CPT

## 2021-04-14 PROCEDURE — 71045 X-RAY EXAM CHEST 1 VIEW: CPT

## 2021-04-14 PROCEDURE — 93005 ELECTROCARDIOGRAM TRACING: CPT

## 2021-04-14 PROCEDURE — 99285 EMERGENCY DEPT VISIT HI MDM: CPT

## 2021-04-14 PROCEDURE — 71275 CT ANGIOGRAPHY CHEST: CPT

## 2021-04-14 PROCEDURE — 74011000636 HC RX REV CODE- 636: Performed by: EMERGENCY MEDICINE

## 2021-04-14 PROCEDURE — 80053 COMPREHEN METABOLIC PANEL: CPT

## 2021-04-14 PROCEDURE — 74011250636 HC RX REV CODE- 250/636: Performed by: EMERGENCY MEDICINE

## 2021-04-14 PROCEDURE — 85610 PROTHROMBIN TIME: CPT

## 2021-04-14 PROCEDURE — 96374 THER/PROPH/DIAG INJ IV PUSH: CPT

## 2021-04-14 PROCEDURE — 83880 ASSAY OF NATRIURETIC PEPTIDE: CPT

## 2021-04-14 PROCEDURE — 85025 COMPLETE CBC W/AUTO DIFF WBC: CPT

## 2021-04-14 PROCEDURE — 82553 CREATINE MB FRACTION: CPT

## 2021-04-14 PROCEDURE — 85730 THROMBOPLASTIN TIME PARTIAL: CPT

## 2021-04-14 RX ORDER — FUROSEMIDE 10 MG/ML
40 INJECTION INTRAMUSCULAR; INTRAVENOUS
Status: COMPLETED | OUTPATIENT
Start: 2021-04-14 | End: 2021-04-14

## 2021-04-14 RX ADMIN — IOPAMIDOL 100 ML: 755 INJECTION, SOLUTION INTRAVENOUS at 12:46

## 2021-04-14 RX ADMIN — FUROSEMIDE 40 MG: 10 INJECTION, SOLUTION INTRAMUSCULAR; INTRAVENOUS at 11:17

## 2021-04-14 NOTE — ED PROVIDER NOTES
EMERGENCY DEPARTMENT HISTORY AND PHYSICAL EXAM    Date: 4/14/2021  Patient Name: Ama Ramirez    History of Presenting Illness     Chief Complaint   Patient presents with    Cough    Ankle swelling    Shortness of Breath         History Provided By: Patient    Additional History (Context): Ama Ramirez is a 64 y.o. female with PMHX congestive heart failure, hypertension, hyperlipidemia presents to the emergency department via private vehicle C/O shortness of breath, cough, bilateral ankle swelling ongoing for the last week. Patient reports that her cardiologist is at regimen. Was told to come to the nearest emergency department for evaluation. States that she has been compliant on her 40 mg of Lasix daily. States that she did take it approximately 1/2 to 2 hours ago. Pt denies chest pain, abdominal pain, nausea, vomiting, fever, and any other sxs or complaints. No relieving or exacerbating factors identified. PCP: Cammie Mccarthy MD    Current Outpatient Medications   Medication Sig Dispense Refill    amLODIPine (NORVASC) 5 mg tablet Take 5 mg by mouth daily.  carvedilol (COREG) 12.5 mg tablet Take 12.5 mg by mouth two (2) times daily (with meals).  cyanocobalamin 1,000 mcg tablet Take 1,000 mcg by mouth daily.  furosemide (LASIX) 20 mg tablet Take 20 mg by mouth daily.  hydroxychloroquine (PLAQUENIL) 200 mg tablet Take 200 mg by mouth daily.  leflunomide (ARAVA) 20 mg tablet Take 20 mg by mouth daily.  lovastatin (MEVACOR) 20 mg tablet Take 20 mg by mouth nightly.  montelukast (SINGULAIR) 10 mg tablet Take 10 mg by mouth daily.  DEXTRAN/HYPROMELLOSE/GLYCERIN (ARTIFICIAL TEAR,DXTRN-HPM-GLY, OP) Apply  to eye.  pantoprazole (PROTONIX) 40 mg tablet Take 40 mg by mouth daily.  potassium chloride SA (MICRO-K) 10 mEq capsule Take 10 mEq by mouth two (2) times a day.  predniSONE (DELTASONE) 5 mg tablet Take 5 mg by mouth.       sulfaSALAzine (AZULFIDINE) 500 mg tablet Take 1,000 mg by mouth four (4) times daily.  cyclobenzaprine (FLEXERIL) 5 mg tablet Take 1 Tab by mouth every eight (8) hours as needed for Muscle Spasm(s). 20 Tab 0       Past History     Past Medical History:  Past Medical History:   Diagnosis Date    Acid reflux     Anemia     Heart failure (HCC)     Hypercholesteremia     Hypertension     Migraine     Obese     Rheumatoid arthritis (HCC)        Past Surgical History:  Past Surgical History:   Procedure Laterality Date    HX HYSTERECTOMY      HX ORTHOPAEDIC      bilateral knee replacement    HX ORTHOPAEDIC      cyst removed from wrist    HX PARATHYROIDECTOMY         Family History:  No family history on file. Social History:  Social History     Tobacco Use    Smoking status: Not on file   Substance Use Topics    Alcohol use: Not on file    Drug use: Not on file       Allergies: Allergies   Allergen Reactions    Other Plant, Animal, Environmental Runny Nose, Cough and Other (comments)     Dust, pollen  Reactions: headache, congestion, eyes tearing         Review of Systems   Review of Systems   Constitutional: Negative for chills and fever. HENT: Negative for congestion, ear pain, sinus pain and sore throat. Eyes: Negative for pain and visual disturbance. Respiratory: Positive for cough and shortness of breath. Cardiovascular: Negative for chest pain and leg swelling. Gastrointestinal: Negative for abdominal pain, constipation, diarrhea, nausea and vomiting. Genitourinary: Negative for dysuria, hematuria, vaginal bleeding and vaginal discharge. Musculoskeletal: Negative for back pain and neck pain. Skin: Negative for rash and wound. Neurological: Negative for dizziness, tremors, weakness, light-headedness and numbness. All other systems reviewed and are negative.       Physical Exam     Vitals:    04/14/21 1047 04/14/21 1120   BP: (!) 149/77 (!) 157/80   Pulse: 90 78   Resp: 17 20   Temp: 97.7 °F (36.5 °C)    SpO2: 97% 98%   Weight: 130.6 kg (288 lb)    Height: 5' 8.5\" (1.74 m)      Physical Exam    Nursing note and vitals reviewed    Constitutional: Middle-aged -American female, no acute distress  Head: Normocephalic, Atraumatic  Eyes: Pupils are equal, round, and reactive to light, EOMI  Neck: Supple, non-tender  Cardiovascular: Regular rate and rhythm, no murmurs, rubs, or gallops, + 2 radial pulses bilaterally  Chest: Normal work of breathing and chest excursion bilaterally  Lungs: Clear to ausculation bilaterally, no wheezes, no rhonchi  Abdomen: Soft, non tender, non distended, normoactive bowel sounds  Back: No evidence of trauma or deformity  Extremities: No evidence of trauma or deformity, trace ankle edema bilaterally, no overlying erythema or ecchymoses.  No streaking erythema, vesicular lesions, ulcerations or bulla  Skin: Warm and dry, normal cap refill  Neuro: Alert and appropriate, CN intact, normal speech, moving all 4 extremities freely and symmetrically  Psychiatric: Normal mood and affect       Diagnostic Study Results     Labs -     Recent Results (from the past 12 hour(s))   EKG, 12 LEAD, INITIAL    Collection Time: 04/14/21 11:03 AM   Result Value Ref Range    Ventricular Rate 77 BPM    Atrial Rate 77 BPM    P-R Interval 134 ms    QRS Duration 90 ms    Q-T Interval 352 ms    QTC Calculation (Bezet) 398 ms    Calculated P Axis 49 degrees    Calculated R Axis 16 degrees    Calculated T Axis 58 degrees    Diagnosis       Normal sinus rhythm  Nonspecific T wave abnormality  Abnormal ECG  No previous ECGs available     CBC WITH AUTOMATED DIFF    Collection Time: 04/14/21 11:15 AM   Result Value Ref Range    WBC 5.7 4.6 - 13.2 K/uL    RBC 3.29 (L) 4.20 - 5.30 M/uL    HGB 9.4 (L) 12.0 - 16.0 g/dL    HCT 29.6 (L) 35.0 - 45.0 %    MCV 90.0 74.0 - 97.0 FL    MCH 28.6 24.0 - 34.0 PG    MCHC 31.8 31.0 - 37.0 g/dL    RDW 14.6 (H) 11.6 - 14.5 %    PLATELET 457 673 - 365 K/uL    MPV 11.9 (H) 9.2 - 11.8 FL    NEUTROPHILS 63 40 - 73 %    LYMPHOCYTES 23 21 - 52 %    MONOCYTES 10 3 - 10 %    EOSINOPHILS 3 0 - 5 %    BASOPHILS 0 0 - 2 %    ABS. NEUTROPHILS 3.6 1.8 - 8.0 K/UL    ABS. LYMPHOCYTES 1.3 0.9 - 3.6 K/UL    ABS. MONOCYTES 0.6 0.05 - 1.2 K/UL    ABS. EOSINOPHILS 0.2 0.0 - 0.4 K/UL    ABS. BASOPHILS 0.0 0.0 - 0.1 K/UL    DF AUTOMATED     CARDIAC PANEL,(CK, CKMB & TROPONIN)    Collection Time: 04/14/21 11:15 AM   Result Value Ref Range    CK - MB 1.1 <3.6 ng/ml    CK-MB Index 0.5 0.0 - 4.0 %     (H) 26 - 192 U/L    Troponin-I, QT <0.02 0.0 - 7.362 NG/ML   METABOLIC PANEL, COMPREHENSIVE    Collection Time: 04/14/21 11:15 AM   Result Value Ref Range    Sodium 142 136 - 145 mmol/L    Potassium 3.6 3.5 - 5.5 mmol/L    Chloride 110 100 - 111 mmol/L    CO2 28 21 - 32 mmol/L    Anion gap 4 3.0 - 18 mmol/L    Glucose 90 74 - 99 mg/dL    BUN 18 7.0 - 18 MG/DL    Creatinine 1.74 (H) 0.6 - 1.3 MG/DL    BUN/Creatinine ratio 10 (L) 12 - 20      GFR est AA 37 (L) >60 ml/min/1.73m2    GFR est non-AA 30 (L) >60 ml/min/1.73m2    Calcium 9.3 8.5 - 10.1 MG/DL    Bilirubin, total 0.6 0.2 - 1.0 MG/DL    ALT (SGPT) 23 13 - 56 U/L    AST (SGOT) 22 10 - 38 U/L    Alk. phosphatase 159 (H) 45 - 117 U/L    Protein, total 7.6 6.4 - 8.2 g/dL    Albumin 4.0 3.4 - 5.0 g/dL    Globulin 3.6 2.0 - 4.0 g/dL    A-G Ratio 1.1 0.8 - 1.7     NT-PRO BNP    Collection Time: 04/14/21 11:15 AM   Result Value Ref Range    NT pro- 0 - 900 PG/ML   D DIMER    Collection Time: 04/14/21 11:15 AM   Result Value Ref Range    D DIMER 0.98 (H) <0.46 ug/ml(FEU)   PROTHROMBIN TIME + INR    Collection Time: 04/14/21 11:15 AM   Result Value Ref Range    Prothrombin time 15.8 (H) 11.5 - 15.2 sec    INR 1.3 (H) 0.8 - 1.2     PTT    Collection Time: 04/14/21 11:15 AM   Result Value Ref Range    aPTT 28.4 23.0 - 36.4 SEC       Radiologic Studies -   CTA CHEST W OR W WO CONT   Final Result      1. No evidence of pulmonary thromboembolic disease. 2.  No infiltrate or other acute pulmonary abnormality. XR CHEST PORT   Final Result      No acute cardiopulmonary abnormality. CT Results  (Last 48 hours)               04/14/21 1254  CTA CHEST W OR W WO CONT Final result    Impression:      1. No evidence of pulmonary thromboembolic disease. 2.  No infiltrate or other acute pulmonary abnormality. Narrative:  EXAM: CTA CHEST W OR W WO CONT       CLINICAL INDICATION/HISTORY: elevated D dimer, shortness of breath       COMPARISON: Chest radiograph same day       TECHNIQUE: Thin section CT was performed through the chest during pulmonary   arterial enhancement. MIP 3D reconstructions were generated to increase   sensitivity in the detection of pulmonary emboli. One or more dose reduction   techniques were used on this CT: automated exposure control, adjustment of the   mAs and/or kVp according to patient size, and iterative reconstruction   techniques. The specific techniques used on this CT exam have been documented   in the patient's electronic medical record. Digital Imaging and Communications   in Medicine (DICOM) format image data are available to nonaffiliated external   healthcare facilities or entities on a secure, media free, reciprocally   searchable basis with patient authorization for at least a 12-month period after   this study. --- EXAM QUALITY ---       1. Overall exam quality- satisfactory: adequate    2. Adequacy of pulmonary enhancement-adequate: sufficient enhancement for   diagnosis down to and including subsegmental vessels. Main PA density 190-250   HU   3. Adequacy of breath hold- adequate: sufficient enough to render diagnosis    4. There are no significant noise, beam hardening, streak artifacts affecting   scan quality.        _______________       FINDINGS: ---  PULMONARY CT ANGIOGRAM  ---       PULMONARY VASCULATURE: The right and left central, primary segmental and   subsegmental pulmonary arteries appear patent. There is no convincing evidence   of intraluminal filling defect identified to suggest pulmonary embolism. THORACIC AORTA: Normal caliber thoracic aorta. No aortic aneurysm, intramural   hematoma or dissection. ---  CT CHEST ---       LUNG PARENCHYMA:   There is mild bibasilar dependent atelectasis. The lung   parenchyma appears otherwise clear. No pulmonary infiltration or consolidation    identified. No pulmonary nodule or mass identified. PLEURAL SPACES:   No pleural effusion or pneumothorax. MEDIASTINUM: Subcentimeter left and right thyroid lobe nodules, too small to   warrant additional dedicated follow-up. No thoracic lymphadenopathy. No global   cardiomegaly. No pericardial effusion. OSSEOUS STRUCTURES:   No acute osseous abnormality. Mild thoracic degenerative   disk disease. UPPER ABDOMEN: Simple appearing anterior interpolar left renal cyst. No   follow-up imaging is recommended as incidental lesions are likely benign. No   acute abnormality. _______________               CXR Results  (Last 48 hours)               04/14/21 1109  XR CHEST PORT Final result    Impression:      No acute cardiopulmonary abnormality. Narrative:  EXAM: XR CHEST PORT       CLINICAL INDICATION/HISTORY: SOB   -Additional: None       COMPARISON: None       TECHNIQUE: Portable frontal view of the chest       _______________       FINDINGS:       SUPPORT DEVICES: None. HEART AND MEDIASTINUM: Cardiomediastinal silhouette within normal limits. LUNGS AND PLEURAL SPACES: No dense consolidation, large effusion or   pneumothorax.       _______________                   Medical Decision Making   I am the first provider for this patient.     I reviewed the vital signs, available nursing notes, past medical history, past surgical history, family history and social history. Vital Signs-Reviewed the patient's vital signs. Pulse Oximetry Analysis -97% on room air    Cardiac Monitor:  Rate: 90 bpm  Rhythm: Regular    EKG interpretation: (Preliminary)  10:51 AM   Normal sinus rhythm at 77 bpm.  MO interval 134 ms. QRS 90 ms. QTc 398 ms. No acute ST elevation    Records Reviewed: Nursing Notes and Old Medical Records    Provider Notes:   64 y.o. female with a history of CHF, hypertension, hyperlipidemia who presents with ankle swelling, shortness of breath, concern for CHF exacerbation. On exam patient is afebrile and normotensive. She does not appear toxic or acutely ill. She is in no respiratory distress saturating 97% on room air and with clear lungs. Trace lower extremity ankle swelling bilaterally. No overlying erythema or ecchymosis, not consistent with cellulitis or active infection. Will give dose of IV Lasix. Will obtain chest x-ray and BNP to evaluate for possible CHF exacerbation. Will evaluate for ACS although patient with no acute ST changes on EKG and no chest pain, very low suspicion for ACS. As the patient is over the age of 48, she is not PERC negative, will evaluate with a D-dimer. Otherwise if lab work and chest x-ray indicates possible CHF exacerbation, this is likely mild, as the patient is not hypoxic, no indication for admission. Procedures:  Procedures    ED Course:   10:51 AM   Initial assessment performed. The patients presenting problems have been discussed, and they are in agreement with the care plan formulated and outlined with them. I have encouraged them to ask questions as they arise throughout their visit. 1:16 PM  Patient BNP and chest x-ray showing no overt pulmonary edema. D-dimer slightly elevated however CTA negative for PE or infiltrate. Patient has diuresed well with IV Lasix. Continues to be saturating well on room air.   Patient advised to follow-up with her cardiologist, discussed no indication for admission         Diagnosis and Disposition     10:59 AM  I have spent 35 minutes of critical care time involved in lab review, consultations with specialist, family decision-making, and documentation. During this entire length of time I was immediately available to the patient. Critical Care: The reason for providing this level of medical care for this critically ill patient was due a critical illness that impaired one or more vital organ systems such that there was a high probability of imminent or life threatening deterioration in the patients condition. This care involved high complexity decision making to assess, manipulate, and support vital system functions, to treat this degreee vital organ system failure and to prevent further life threatening deterioration of the patients condition. DISCHARGE NOTE:  1:16 PM    Amparo Yoon  results have been reviewed with her. She has been counseled regarding her diagnosis, treatment, and plan. She verbally conveys understanding and agreement of the signs, symptoms, diagnosis, treatment and prognosis and additionally agrees to follow up as discussed. She also agrees with the care-plan and conveys that all of her questions have been answered. I have also provided discharge instructions for her that include: educational information regarding their diagnosis and treatment, and list of reasons why they would want to return to the ED prior to their follow-up appointment, should her condition change. She has been provided with education for proper emergency department utilization. CLINICAL IMPRESSION:    1. Acute on chronic congestive heart failure, unspecified heart failure type ([address]    2. Chronic anemia        PLAN:  1. D/C Home  2. Current Discharge Medication List        3.    Follow-up Information     Follow up With Specialties Details Why Contact Info    Pippa Munguia MD Internal Medicine Schedule an appointment as soon as possible for a visit in 2 days  [address] [facility] [address]  [phone]      [facility] EMERGENCY DEPT Emergency Medicine  As needed if symptoms worsen [address] [id number]  [phone]        ____________________________________     Please note that this dictation was completed with [employer], the computer voice recognition software. Quite often unanticipated grammatical, syntax, homophones, and other interpretive errors are inadvertently transcribed by the computer software. Please disregard these errors. Please excuse any errors that have escaped final proofreading.

## 2021-09-13 ENCOUNTER — HOSPITAL ENCOUNTER (OUTPATIENT)
Dept: LAB | Age: 56
Discharge: HOME | End: 2021-09-13

## 2021-09-13 LAB — XX-LABCORP SPECIMEN COL,LCBCF: NORMAL

## 2021-09-13 PROCEDURE — 99001 SPECIMEN HANDLING PT-LAB: CPT

## 2021-10-03 ENCOUNTER — APPOINTMENT (OUTPATIENT)
Dept: NUCLEAR MEDICINE | Age: 56
End: 2021-10-03
Attending: EMERGENCY MEDICINE
Payer: MEDICAID

## 2021-10-03 ENCOUNTER — HOSPITAL ENCOUNTER (OUTPATIENT)
Age: 56
Setting detail: OBSERVATION
Discharge: LEFT BEFORE DISCHARGE | End: 2021-10-05
Attending: EMERGENCY MEDICINE | Admitting: HOSPITALIST
Payer: MEDICAID

## 2021-10-03 ENCOUNTER — APPOINTMENT (OUTPATIENT)
Dept: GENERAL RADIOLOGY | Age: 56
End: 2021-10-03
Attending: EMERGENCY MEDICINE
Payer: MEDICAID

## 2021-10-03 DIAGNOSIS — N17.9 ACUTE KIDNEY INJURY (HCC): Primary | ICD-10-CM

## 2021-10-03 DIAGNOSIS — M79.671 PAIN OF RIGHT HEEL: ICD-10-CM

## 2021-10-03 LAB
ALBUMIN SERPL-MCNC: 3.9 G/DL (ref 3.4–5)
ALBUMIN/GLOB SERPL: 1 {RATIO} (ref 0.8–1.7)
ALP SERPL-CCNC: 114 U/L (ref 45–117)
ALT SERPL-CCNC: 13 U/L (ref 13–56)
ANION GAP SERPL CALC-SCNC: 12 MMOL/L (ref 3–18)
AST SERPL-CCNC: 18 U/L (ref 10–38)
ATRIAL RATE: 72 BPM
BASOPHILS # BLD: 0 K/UL (ref 0–0.1)
BASOPHILS NFR BLD: 0 % (ref 0–2)
BILIRUB SERPL-MCNC: 0.4 MG/DL (ref 0.2–1)
BNP SERPL-MCNC: 511 PG/ML (ref 0–900)
BUN SERPL-MCNC: 43 MG/DL (ref 7–18)
BUN/CREAT SERPL: 12 (ref 12–20)
CALCIUM SERPL-MCNC: 9.1 MG/DL (ref 8.5–10.1)
CALCULATED P AXIS, ECG09: 39 DEGREES
CALCULATED R AXIS, ECG10: -2 DEGREES
CALCULATED T AXIS, ECG11: -148 DEGREES
CHLORIDE SERPL-SCNC: 106 MMOL/L (ref 100–111)
CK SERPL-CCNC: 201 U/L (ref 26–192)
CO2 SERPL-SCNC: 25 MMOL/L (ref 21–32)
COVID-19 RAPID TEST, COVR: NOT DETECTED
CREAT SERPL-MCNC: 3.5 MG/DL (ref 0.6–1.3)
D DIMER PPP FEU-MCNC: 2.37 UG/ML(FEU)
DIAGNOSIS, 93000: NORMAL
DIFFERENTIAL METHOD BLD: ABNORMAL
EOSINOPHIL # BLD: 0.2 K/UL (ref 0–0.4)
EOSINOPHIL NFR BLD: 3 % (ref 0–5)
ERYTHROCYTE [DISTWIDTH] IN BLOOD BY AUTOMATED COUNT: 15.5 % (ref 11.6–14.5)
GLOBULIN SER CALC-MCNC: 3.9 G/DL (ref 2–4)
GLUCOSE SERPL-MCNC: 92 MG/DL (ref 74–99)
HCT VFR BLD AUTO: 25.6 % (ref 35–45)
HGB BLD-MCNC: 8 G/DL (ref 12–16)
LYMPHOCYTES # BLD: 1.3 K/UL (ref 0.9–3.6)
LYMPHOCYTES NFR BLD: 25 % (ref 21–52)
MCH RBC QN AUTO: 26.9 PG (ref 24–34)
MCHC RBC AUTO-ENTMCNC: 31.3 G/DL (ref 31–37)
MCV RBC AUTO: 86.2 FL (ref 78–100)
MONOCYTES # BLD: 0.3 K/UL (ref 0.05–1.2)
MONOCYTES NFR BLD: 6 % (ref 3–10)
NEUTS SEG # BLD: 3.4 K/UL (ref 1.8–8)
NEUTS SEG NFR BLD: 66 % (ref 40–73)
P-R INTERVAL, ECG05: 124 MS
PLATELET # BLD AUTO: 273 K/UL (ref 135–420)
PLATELET COMMENTS,PCOM: ABNORMAL
PMV BLD AUTO: 11.7 FL (ref 9.2–11.8)
POTASSIUM SERPL-SCNC: 3.2 MMOL/L (ref 3.5–5.5)
PROT SERPL-MCNC: 7.8 G/DL (ref 6.4–8.2)
Q-T INTERVAL, ECG07: 394 MS
QRS DURATION, ECG06: 94 MS
QTC CALCULATION (BEZET), ECG08: 431 MS
RBC # BLD AUTO: 2.97 M/UL (ref 4.2–5.3)
RBC MORPH BLD: ABNORMAL
SODIUM SERPL-SCNC: 143 MMOL/L (ref 136–145)
SOURCE, COVRS: NORMAL
TROPONIN I SERPL-MCNC: <0.02 NG/ML (ref 0–0.04)
VENTRICULAR RATE, ECG03: 72 BPM
WBC # BLD AUTO: 5.2 K/UL (ref 4.6–13.2)
WBC MORPH BLD: ABNORMAL

## 2021-10-03 PROCEDURE — 96360 HYDRATION IV INFUSION INIT: CPT

## 2021-10-03 PROCEDURE — 96372 THER/PROPH/DIAG INJ SC/IM: CPT

## 2021-10-03 PROCEDURE — 85025 COMPLETE CBC W/AUTO DIFF WBC: CPT

## 2021-10-03 PROCEDURE — 99285 EMERGENCY DEPT VISIT HI MDM: CPT

## 2021-10-03 PROCEDURE — 80053 COMPREHEN METABOLIC PANEL: CPT

## 2021-10-03 PROCEDURE — 74011250636 HC RX REV CODE- 250/636: Performed by: INTERNAL MEDICINE

## 2021-10-03 PROCEDURE — 93005 ELECTROCARDIOGRAM TRACING: CPT

## 2021-10-03 PROCEDURE — 99218 HC RM OBSERVATION: CPT

## 2021-10-03 PROCEDURE — 87635 SARS-COV-2 COVID-19 AMP PRB: CPT

## 2021-10-03 PROCEDURE — 82550 ASSAY OF CK (CPK): CPT

## 2021-10-03 PROCEDURE — 74011250636 HC RX REV CODE- 250/636: Performed by: EMERGENCY MEDICINE

## 2021-10-03 PROCEDURE — 65270000029 HC RM PRIVATE

## 2021-10-03 PROCEDURE — 96361 HYDRATE IV INFUSION ADD-ON: CPT

## 2021-10-03 PROCEDURE — 74011250637 HC RX REV CODE- 250/637: Performed by: INTERNAL MEDICINE

## 2021-10-03 PROCEDURE — 71046 X-RAY EXAM CHEST 2 VIEWS: CPT

## 2021-10-03 PROCEDURE — 83880 ASSAY OF NATRIURETIC PEPTIDE: CPT

## 2021-10-03 PROCEDURE — 85379 FIBRIN DEGRADATION QUANT: CPT

## 2021-10-03 PROCEDURE — 84484 ASSAY OF TROPONIN QUANT: CPT

## 2021-10-03 RX ORDER — ONDANSETRON 2 MG/ML
4 INJECTION INTRAMUSCULAR; INTRAVENOUS
Status: DISCONTINUED | OUTPATIENT
Start: 2021-10-03 | End: 2021-10-05 | Stop reason: HOSPADM

## 2021-10-03 RX ORDER — OXYCODONE AND ACETAMINOPHEN 5; 325 MG/1; MG/1
1-2 TABLET ORAL
Status: DISCONTINUED | OUTPATIENT
Start: 2021-10-03 | End: 2021-10-05 | Stop reason: HOSPADM

## 2021-10-03 RX ORDER — OXYCODONE AND ACETAMINOPHEN 5; 325 MG/1; MG/1
1 TABLET ORAL
COMMUNITY

## 2021-10-03 RX ORDER — POLYETHYLENE GLYCOL 3350 17 G/17G
17 POWDER, FOR SOLUTION ORAL DAILY PRN
Status: DISCONTINUED | OUTPATIENT
Start: 2021-10-03 | End: 2021-10-05 | Stop reason: HOSPADM

## 2021-10-03 RX ORDER — SODIUM CHLORIDE 0.9 % (FLUSH) 0.9 %
5-40 SYRINGE (ML) INJECTION EVERY 8 HOURS
Status: DISCONTINUED | OUTPATIENT
Start: 2021-10-03 | End: 2021-10-05 | Stop reason: HOSPADM

## 2021-10-03 RX ORDER — GUAIFENESIN 100 MG/5ML
81 LIQUID (ML) ORAL 2 TIMES DAILY
COMMUNITY

## 2021-10-03 RX ORDER — AMLODIPINE BESYLATE 5 MG/1
5 TABLET ORAL DAILY
Status: DISCONTINUED | OUTPATIENT
Start: 2021-10-04 | End: 2021-10-05 | Stop reason: HOSPADM

## 2021-10-03 RX ORDER — POTASSIUM CHLORIDE 20 MEQ/1
40 TABLET, EXTENDED RELEASE ORAL
Status: COMPLETED | OUTPATIENT
Start: 2021-10-03 | End: 2021-10-03

## 2021-10-03 RX ORDER — SODIUM CHLORIDE 0.9 % (FLUSH) 0.9 %
5-40 SYRINGE (ML) INJECTION AS NEEDED
Status: DISCONTINUED | OUTPATIENT
Start: 2021-10-03 | End: 2021-10-05 | Stop reason: HOSPADM

## 2021-10-03 RX ORDER — ONDANSETRON 4 MG/1
4 TABLET, ORALLY DISINTEGRATING ORAL
Status: DISCONTINUED | OUTPATIENT
Start: 2021-10-03 | End: 2021-10-05 | Stop reason: HOSPADM

## 2021-10-03 RX ORDER — HEPARIN SODIUM 5000 [USP'U]/ML
5000 INJECTION, SOLUTION INTRAVENOUS; SUBCUTANEOUS EVERY 8 HOURS
Status: DISCONTINUED | OUTPATIENT
Start: 2021-10-03 | End: 2021-10-05 | Stop reason: HOSPADM

## 2021-10-03 RX ORDER — GUAIFENESIN 100 MG/5ML
81 LIQUID (ML) ORAL 2 TIMES DAILY
Status: DISCONTINUED | OUTPATIENT
Start: 2021-10-03 | End: 2021-10-05 | Stop reason: HOSPADM

## 2021-10-03 RX ORDER — SODIUM CHLORIDE 9 MG/ML
75 INJECTION, SOLUTION INTRAVENOUS CONTINUOUS
Status: DISCONTINUED | OUTPATIENT
Start: 2021-10-03 | End: 2021-10-05 | Stop reason: HOSPADM

## 2021-10-03 RX ORDER — ACETAMINOPHEN 325 MG/1
650 TABLET ORAL
Status: DISCONTINUED | OUTPATIENT
Start: 2021-10-03 | End: 2021-10-05 | Stop reason: HOSPADM

## 2021-10-03 RX ORDER — ACETAMINOPHEN 650 MG/1
650 SUPPOSITORY RECTAL
Status: DISCONTINUED | OUTPATIENT
Start: 2021-10-03 | End: 2021-10-05 | Stop reason: HOSPADM

## 2021-10-03 RX ADMIN — HEPARIN SODIUM 5000 UNITS: 5000 INJECTION INTRAVENOUS; SUBCUTANEOUS at 21:30

## 2021-10-03 RX ADMIN — ACETAMINOPHEN 650 MG: 325 TABLET ORAL at 21:29

## 2021-10-03 RX ADMIN — SODIUM CHLORIDE 75 ML/HR: 900 INJECTION, SOLUTION INTRAVENOUS at 21:29

## 2021-10-03 RX ADMIN — POTASSIUM CHLORIDE 40 MEQ: 20 TABLET, EXTENDED RELEASE ORAL at 21:29

## 2021-10-03 RX ADMIN — ASPIRIN 81 MG: 81 TABLET, CHEWABLE ORAL at 21:29

## 2021-10-03 RX ADMIN — SODIUM CHLORIDE 250 ML: 900 INJECTION, SOLUTION INTRAVENOUS at 14:36

## 2021-10-03 NOTE — ROUTINE PROCESS
[address] received SBAR from Terra Mcmillan RN in ED.     [id number] patient arrived on floor, alert and oriented x 4. Vital signs stable, call light within reach, no requests at this time. Family member bringing patient's knee scooter from home. SBAR report given to Damian night shift RN.

## 2021-10-03 NOTE — H&P
HISTORY AND PHYSICAL EXAMINATION      Assessment:     Patient Active Problem List    Diagnosis Date Noted    Acute kidney injury ([facility] [address] 10/03/2021    Pain of right heel 10/03/2021   Impression    1. Acute kidney injury    2. Right lower extremity/pelvic pain    3. Hypertension    4. Rheumatoid arthritis    5. Elevated D-dimer    6. Anemia    7. Status post recent ankle fusion surgery at [facility] about a month ago with the request 2 weeks ago    8. Shortness of breath    9. Hypokalemia            Plan:          Patient will be admitted to medical telemetry floor. Regarding her renal failure, we will obtain renal ultrasound. IV gentle fluid hydration is ordered. Urine studies including urine electrolytes, eosinophils are pending. Avoid nephrotoxins. Follow-up blood test as ordered if worsening consider renal consult. Obtain echocardiogram given her shortness of breath. Please note she did have a VQ scan which was negative for PE. Replete potassium and recheck  Continue on her Norvasc. Hold hydrochlorothiazide. She is not on ACE or ARB's. Consider consulting physical therapy since she is status post ankle fusion surgery at [facility] about 4 weeks ago. Subjective:        Patient is 63-year old female with history of right ankle fusion surgery at [facility] on September 1. Patient had recast 2 weeks ago but since then she is having ankle pain this was initially thought due to a crack in it. Patient states she started feeling weak and shortness of breath about a week ago hence she was brought to the emergency room. Patient states she does have a history of congestive heart failure but details not available. Upon evaluation in emergency room patient was found to have creatinine of 3.5. Please note to not have all previous creatinine available. She denies having any history of kidney problem.        Past Medical History:   Diagnosis Date    Acid reflux     Anemia     Heart failure ([facility] [address]     Hypercholesteremia     Hypertension     Migraine     Obese     Rheumatoid arthritis (HCC)        Past Surgical History:   Procedure Laterality Date    HX HYSTERECTOMY      HX ORTHOPAEDIC      bilateral knee replacement    HX ORTHOPAEDIC      cyst removed from wrist    HX ORTHOPAEDIC      rt ankle fusion    HX PARATHYROIDECTOMY         Allergies   Allergen Reactions    Other Plant, Animal, Environmental Runny Nose, Cough and Other (comments)     Dust, pollen  Reactions: headache, congestion, eyes tearing       Current Facility-Administered Medications   Medication Dose Route Frequency Provider Last Rate Last Admin    sodium chloride (NS) flush 5-40 mL  5-40 mL IntraVENous Q8H Luís Dose, MD        sodium chloride (NS) flush 5-40 mL  5-40 mL IntraVENous PRN Luís Dose, MD        acetaminophen (TYLENOL) tablet 650 mg  650 mg Oral Q6H PRN Luís Dose, MD Michael Pizano acetaminophen (TYLENOL) suppository 650 mg  650 mg Rectal Q6H PRN Luís Dose, MD        polyethylene glycol (MIRALAX) packet 17 g  17 g Oral DAILY PRN Luís Dose, MD        ondansetron (ZOFRAN ODT) tablet 4 mg  4 mg Oral Q8H PRN Luís Dose, MD        Or    ondansetron (ZOFRAN) injection 4 mg  4 mg IntraVENous Q6H PRN Luís Dose, MD        0.9% sodium chloride infusion  75 mL/hr IntraVENous CONTINUOUS Luís Dose, MD        heparin (porcine) injection 5,000 Units  5,000 Units SubCUTAneous Q8H Luís Dose, MD Fiona Piz[facility] 10/4/2021] amLODIPine (NORVASC) tablet 5 mg  5 mg Oral DAILY Luís Dose, MD        aspirin chewable tablet 81 mg  81 mg Oral BID Luís Dose, MD        potassium chloride (K-DUR, KLOR-CON) SR tablet 40 mEq  40 mEq Oral NOW Luís Dose, MD         Current Outpatient Medications   Medication Sig Dispense Refill    aspirin 81 mg chewable tablet Take 81 mg by mouth two (2) times a day.       oxyCODONE-acetaminophen (PERCOCET) 5-325 mg per tablet Take 1 Tablet by mouth every four (4) hours as needed for Pain.  amLODIPine (NORVASC) 5 mg tablet Take 5 mg by mouth daily.  carvedilol (COREG) 12.5 mg tablet Take 12.5 mg by mouth two (2) times daily (with meals).  cyanocobalamin 1,000 mcg tablet Take 1,000 mcg by mouth daily.  furosemide (LASIX) 20 mg tablet Take 20 mg by mouth daily.  hydroxychloroquine (PLAQUENIL) 200 mg tablet Take 200 mg by mouth daily.  leflunomide (ARAVA) 20 mg tablet Take 20 mg by mouth daily.  lovastatin (MEVACOR) 20 mg tablet Take 20 mg by mouth nightly.  montelukast (SINGULAIR) 10 mg tablet Take 10 mg by mouth daily.  DEXTRAN/HYPROMELLOSE/GLYCERIN (ARTIFICIAL TEAR,DXTRN-HPM-GLY, OP) Apply  to eye.  pantoprazole (PROTONIX) 40 mg tablet Take 40 mg by mouth daily.  cyclobenzaprine (FLEXERIL) 5 mg tablet Take 1 Tab by mouth every eight (8) hours as needed for Muscle Spasm(s). 20 Tab 0       Prior to Admission Medications   Prescriptions Last Dose Informant Patient Reported? Taking? DEXTRAN/HYPROMELLOSE/GLYCERIN (ARTIFICIAL TEAR,DXTRN-HPM-GLY, OP)   Yes No   Sig: Apply  to eye. amLODIPine (NORVASC) 5 mg tablet   Yes No   Sig: Take 5 mg by mouth daily. aspirin 81 mg chewable tablet   Yes Yes   Sig: Take 81 mg by mouth two (2) times a day. carvedilol (COREG) 12.5 mg tablet   Yes No   Sig: Take 12.5 mg by mouth two (2) times daily (with meals). cyanocobalamin 1,000 mcg tablet   Yes No   Sig: Take 1,000 mcg by mouth daily. cyclobenzaprine (FLEXERIL) 5 mg tablet   No No   Sig: Take 1 Tab by mouth every eight (8) hours as needed for Muscle Spasm(s). furosemide (LASIX) 20 mg tablet   Yes No   Sig: Take 20 mg by mouth daily. hydroxychloroquine (PLAQUENIL) 200 mg tablet   Yes No   Sig: Take 200 mg by mouth daily. leflunomide (ARAVA) 20 mg tablet   Yes No   Sig: Take 20 mg by mouth daily. lovastatin (MEVACOR) 20 mg tablet   Yes No   Sig: Take 20 mg by mouth nightly.    montelukast (SINGULAIR) 10 mg tablet Yes No   Sig: Take 10 mg by mouth daily. oxyCODONE-acetaminophen (PERCOCET) 5-325 mg per tablet   Yes Yes   Sig: Take 1 Tablet by mouth every four (4) hours as needed for Pain.   pantoprazole (PROTONIX) 40 mg tablet   Yes No   Sig: Take 40 mg by mouth daily. Facility-Administered Medications: None       Social History     Socioeconomic History    Marital status:      Spouse name: Not on file    Number of children: Not on file    Years of education: Not on file    Highest education level: Not on file   Occupational History    Not on file   Tobacco Use    Smoking status: Never Smoker    Smokeless tobacco: Never Used   Substance and Sexual Activity    Alcohol use: Not Currently    Drug use: Never    Sexual activity: Not on file   Other Topics Concern    Not on file   Social History Narrative    Not on file     Social Determinants of Health     Financial Resource Strain:     Difficulty of Paying Living Expenses:    Food Insecurity:     Worried About Running Out of Food in the Last Year:     [address] in the Last Year:    Transportation Needs:     Lack of Transportation (Medical):  Lack of Transportation (Non-Medical):    Physical Activity:     Days of Exercise per Week:     Minutes of Exercise per Session:    Stress:     Feeling of Stress :    Social Connections:     Frequency of Communication with Friends and Family:     Frequency of Social Gatherings with Friends and Family:     Attends [employer] Services:     Active Member of Clubs or Organizations:     Attends Club or Organization Meetings:     Marital Status:    Intimate Partner Violence:     Fear of Current or Ex-Partner:     Emotionally Abused:     Physically Abused:     Sexually Abused:        History reviewed. No pertinent family history. Review of Systems   Constitutional: Positive for malaise/fatigue. HENT: Negative. Eyes: Negative. Respiratory: Positive for shortness of breath.  Negative for cough. Cardiovascular: Negative for chest pain, palpitations, orthopnea and leg swelling. Gastrointestinal: Negative. Negative for abdominal pain, diarrhea and heartburn. Genitourinary: Negative for dysuria and hematuria. Skin: Negative. Neurological: Positive for weakness. Psychiatric/Behavioral: Negative for depression, substance abuse and suicidal ideas. The patient is not nervous/anxious. Objective:     Patient Vitals for the past 24 hrs:   BP Temp Pulse Resp SpO2 Height Weight   10/03/21 1500 (!) 154/82 -- 68 15 100 % -- --   10/03/21 1445 (!) 162/86 -- 71 17 100 % -- --   10/03/21 1444 (!) 137/91 97.5 °F (36.4 °C) 72 19 100 % -- --   10/03/21 1438 -- -- -- -- 98 % -- --   10/03/21 1305 (!) 150/81 97.5 °F (36.4 °C) 80 16 97 % 5' 9\" (1.753 m) 114.8 kg (253 lb)           Extended / Orthostatic Vitals:    Vital Signs  Level of Consciousness: Alert (0) (10/03/21 1444)  Temp: 97.5 °F (36.4 °C) (10/03/21 1444)  Temp Source: Oral (10/03/21 1444)  Pulse (Heart Rate): 68 (10/03/21 1500)  Resp Rate: 15 (10/03/21 1500)  BP: (!) 154/82 (10/03/21 1500)  MAP (Monitor): 98 (10/03/21 1500)  MAP (Calculated): 106 (10/03/21 1500)  BP 1 Location: Left arm (10/03/21 1444)  BP 1 Method: Automatic (10/03/21 1444)  BP Patient Position: Sitting (10/03/21 1305)  MEWS Score: 1 (10/03/21 1444)         Oxygen Therapy  O2 Sat (%): 100 % (10/03/21 1500)  Pulse via Oximetry: 67 beats per minute (10/03/21 1500)  O2 Device: None (10/03/21 1444)     Physical Exam  Vitals and nursing note reviewed. Constitutional:       General: She is in acute distress. Appearance: She is ill-appearing. Neck:      Vascular: No JVD. Cardiovascular:      Rate and Rhythm: Normal rate and regular rhythm. Heart sounds: Normal heart sounds. Pulmonary:      Effort: Respiratory distress present. Breath sounds: Normal breath sounds. Abdominal:      General: Bowel sounds are normal. There is no distension.       Palpations: Abdomen is soft. Tenderness: There is no abdominal tenderness. There is no rebound. Musculoskeletal:         General: Normal range of motion. Cervical back: Normal range of motion and neck supple. Skin:     General: Skin is warm and dry. Neurological:      Mental Status: She is alert and oriented to person, place, and time. Psychiatric:         Mood and Affect: Affect normal.         Laboratory:     Recent Results (from the past 24 hour(s))   EKG, 12 LEAD, INITIAL    Collection Time: 10/03/21  1:30 PM   Result Value Ref Range    Ventricular Rate 72 BPM    Atrial Rate 72 BPM    P-R Interval 124 ms    QRS Duration 94 ms    Q-T Interval 394 ms    QTC Calculation (Bezet) 431 ms    Calculated P Axis 39 degrees    Calculated R Axis -2 degrees    Calculated T Axis -148 degrees    Diagnosis       Normal sinus rhythm  ST & T wave abnormality, consider inferior ischemia  ST & T wave abnormality, consider anterolateral ischemia  Abnormal ECG  When compared with ECG of 14-APR-2021 11:03,  Inverted T waves have replaced nonspecific T wave abnormality in Anterior   leads     CBC WITH AUTOMATED DIFF    Collection Time: 10/03/21  1:41 PM   Result Value Ref Range    WBC 5.2 4.6 - 13.2 K/uL    RBC 2.97 (L) 4.20 - 5.30 M/uL    HGB 8.0 (L) 12.0 - 16.0 g/dL    HCT 25.6 (L) 35.0 - 45.0 %    MCV 86.2 78.0 - 100.0 FL    MCH 26.9 24.0 - 34.0 PG    MCHC 31.3 31.0 - 37.0 g/dL    RDW 15.5 (H) 11.6 - 14.5 %    PLATELET 700 251 - 486 K/uL    MPV 11.7 9.2 - 11.8 FL    NEUTROPHILS 66 40 - 73 %    LYMPHOCYTES 25 21 - 52 %    MONOCYTES 6 3 - 10 %    EOSINOPHILS 3 0 - 5 %    BASOPHILS 0 0 - 2 %    ABS. NEUTROPHILS 3.4 1.8 - 8.0 K/UL    ABS. LYMPHOCYTES 1.3 0.9 - 3.6 K/UL    ABS. MONOCYTES 0.3 0.05 - 1.2 K/UL    ABS. EOSINOPHILS 0.2 0.0 - 0.4 K/UL    ABS.  BASOPHILS 0.0 0.0 - 0.1 K/UL    DF MANUAL      PLATELET COMMENTS ADEQUATE PLATELETS      RBC COMMENTS ANISOCYTOSIS  1+        RBC COMMENTS TARGET CELLS  1+        RBC COMMENTS HYPOCHROMIA  SLIGHT  POLYCHROMASIA  SLIGHT        WBC COMMENTS 1 nRBC  WBC    METABOLIC PANEL, COMPREHENSIVE    Collection Time: 10/03/21  1:41 PM   Result Value Ref Range    Sodium 143 136 - 145 mmol/L    Potassium 3.2 (L) 3.5 - 5.5 mmol/L    Chloride 106 100 - 111 mmol/L    CO2 25 21 - 32 mmol/L    Anion gap 12 3.0 - 18 mmol/L    Glucose 92 74 - 99 mg/dL    BUN 43 (H) 7.0 - 18 MG/DL    Creatinine 3.50 (H) 0.6 - 1.3 MG/DL    BUN/Creatinine ratio 12 12 - 20      GFR est AA 16 (L) >60 ml/min/1.73m2    GFR est non-AA 14 (L) >60 ml/min/1.73m2    Calcium 9.1 8.5 - 10.1 MG/DL    Bilirubin, total 0.4 0.2 - 1.0 MG/DL    ALT (SGPT) 13 13 - 56 U/L    AST (SGOT) 18 10 - 38 U/L    Alk. phosphatase 114 45 - 117 U/L    Protein, total 7.8 6.4 - 8.2 g/dL    Albumin 3.9 3.4 - 5.0 g/dL    Globulin 3.9 2.0 - 4.0 g/dL    A-G Ratio 1.0 0.8 - 1.7     NT-PRO BNP    Collection Time: 10/03/21  1:41 PM   Result Value Ref Range    NT pro- 0 - 900 PG/ML   TROPONIN I    Collection Time: 10/03/21  1:41 PM   Result Value Ref Range    Troponin-I, QT <0.02 0.0 - 0.045 NG/ML   D DIMER    Collection Time: 10/03/21  1:41 PM   Result Value Ref Range    D DIMER 2.37 (H) <0.46 ug/ml(FEU)   COVID-19 RAPID TEST    Collection Time: 10/03/21  1:41 PM   Result Value Ref Range    Specimen source Nasopharyngeal      COVID-19 rapid test Not detected NOTD           Imaging/Procedures:     Significant Diagnostic Studies: XR CHEST PA LAT    Result Date: 10/3/2021  EXAM:  PA and Lateral Chest X-ray INDICATION: Shortness of breath COMPARISON: CT dated April 14, 2021 ___________ FINDINGS: Heart and mediastinal contours are within normal limits. Lungs are clear of active disease. There are no pleural effusions. No acute osseous findings.  _____________      No acute process    NM LUNG PERFUSION W VENT    Result Date: 10/3/2021  EXAM: VQ scan INDICATION: Rule out PE COMPARISON: October 3, 2021 TECHNIQUE: 6.6 mCi of Tc 99m MAA was administered for perfusion. Injection site was right antecubital fossa. _______________ FINDINGS: PERFUSION: No perfusion defects seen. _______________     PE absent.                Azeem Flood MD    10/3/2021, 6:21 PM

## 2021-10-03 NOTE — ROUTINE PROCESS
TRANSFER - OUT REPORT:    Verbal report given to Charisse Sandhu RN(name) on Marysol Marinelli  being transferred to medical unit(unit) for routine progression of care       Report consisted of patients Situation, Background, Assessment and   Recommendations(SBAR). Information from the following report(s) SBAR and Kardex was reviewed with the receiving nurse. Lines:   Peripheral IV 10/03/21 Right;Posterior;Proximal Forearm (Active)   Site Assessment Clean, dry, & intact 10/03/21 1358   Phlebitis Assessment 0 10/03/21 1358   Infiltration Assessment 0 10/03/21 1358   Dressing Status Clean, dry, & intact 10/03/21 1358   Dressing Type Transparent 10/03/21 1358   Hub Color/Line Status Flushed 10/03/21 1358   Action Taken Blood drawn 10/03/21 1358   Alcohol Cap Used Yes 10/03/21 1358        Opportunity for questions and clarification was provided.       Patient transported with:   [employer]

## 2021-10-03 NOTE — ED TRIAGE NOTES
Pt states she had rt ankle fusion on 9-1 at [place]. Pt states seen 2 weeks later had cast changed. Noticed on thurs. Am cast has a crack in it. Called [facility] and inst to come here. Pt states on Monday began having SOB, hx of CHF  Pt states not able to move much due to foot.

## 2021-10-03 NOTE — ED PROVIDER NOTES
EMERGENCY DEPARTMENT HISTORY AND PHYSICAL EXAM    Date: 10/3/2021  Patient Name: Queen Ary    History of Presenting Illness     Chief Complaint   Patient presents with    Cast problem    Shortness of Breath         History Provided By: Patient    Additional History (Context): Queen Ary is a 64 y.o. female with hypertension, hyperlipidemia, osteoarthritis and CHF who presents with c/o right ankle cast cracking on the heel. She had ankle fusion surgery performed at [facility] approximately a month ago had a recast 2 weeks ago and then Thursday of this past week she noticed that she only felt completely comfortable if her foot was elevated. Her  noticed that when she had her foot up that the cast is cracking on the heel. Denies fever or swelling or pain in her toes or sensation that the cast is on too tightly. She called at [facility] and they said to come to the emergency department. Also, patient would like to be checked for her CHF. She said she has been may be more short of breath since Monday of this past week. Followed by cardiology at [facility]; last echo or stress test prior to onset of COVID. Denies CP. Is currently asymptomatic.   Says her dyspnea is exertional.    PCP: Other, MD Shantell    Current Facility-Administered Medications   Medication Dose Route Frequency Provider Last Rate Last Admin    sodium chloride (NS) flush 5-40 mL  5-40 mL IntraVENous Q8H Luís Puente MD        sodium chloride (NS) flush 5-40 mL  5-40 mL IntraVENous PRN Luís Puente MD        acetaminophen (TYLENOL) tablet 650 mg  650 mg Oral Q6H PRN Luís Puente MD        Or   [address] [place] [address] acetaminophen (TYLENOL) suppository 650 mg  650 mg Rectal Q6H PRN Luís Puente MD        polyethylene glycol (MIRALAX) packet 17 g  17 g Oral DAILY PRN Luís Puente MD        ondansetron (ZOFRAN ODT) tablet 4 mg  4 mg Oral Q8H PRN Luís Puente MD        Or    ondansetron [facility]) injection 4 mg  4 mg IntraVENous Q6H PRN Sherle Day, Keara Ojeda MD        0.9% sodium chloride infusion  75 mL/hr IntraVENous CONTINUOUS Marika Squires MD        heparin (porcine) injection 5,000 Units  5,000 Units SubCUTAneous Q8H MD Lyndsey Kirby ON 10/4/2021] amLODIPine (NORVASC) tablet 5 mg  5 mg Oral DAILY Marika Squires MD        aspirin chewable tablet 81 mg  81 mg Oral BID Marika Squires MD        potassium chloride (K-DUR, KLOR-CON) SR tablet 40 mEq  40 mEq Oral NOW Marika Squires MD         Current Outpatient Medications   Medication Sig Dispense Refill    aspirin 81 mg chewable tablet Take 81 mg by mouth two (2) times a day.  oxyCODONE-acetaminophen (PERCOCET) 5-325 mg per tablet Take 1 Tablet by mouth every four (4) hours as needed for Pain.  amLODIPine (NORVASC) 5 mg tablet Take 5 mg by mouth daily.  carvedilol (COREG) 12.5 mg tablet Take 12.5 mg by mouth two (2) times daily (with meals).  cyanocobalamin 1,000 mcg tablet Take 1,000 mcg by mouth daily.  furosemide (LASIX) 20 mg tablet Take 20 mg by mouth daily.  hydroxychloroquine (PLAQUENIL) 200 mg tablet Take 200 mg by mouth daily.  leflunomide (ARAVA) 20 mg tablet Take 20 mg by mouth daily.  lovastatin (MEVACOR) 20 mg tablet Take 20 mg by mouth nightly.  montelukast (SINGULAIR) 10 mg tablet Take 10 mg by mouth daily.  DEXTRAN/HYPROMELLOSE/GLYCERIN (ARTIFICIAL TEAR,DXTRN-HPM-GLY, OP) Apply  to eye.  pantoprazole (PROTONIX) 40 mg tablet Take 40 mg by mouth daily.  cyclobenzaprine (FLEXERIL) 5 mg tablet Take 1 Tab by mouth every eight (8) hours as needed for Muscle Spasm(s).  20 Tab 0       Past History     Past Medical History:  Past Medical History:   Diagnosis Date    Acid reflux     Anemia     Heart failure (HCC)     Hypercholesteremia     Hypertension     Migraine     Obese     Rheumatoid arthritis (HCC)        Past Surgical History:  Past Surgical History:   Procedure Laterality Date    HX HYSTERECTOMY      HX ORTHOPAEDIC      bilateral knee replacement    HX ORTHOPAEDIC      cyst removed from wrist    HX ORTHOPAEDIC      rt ankle fusion    HX PARATHYROIDECTOMY         Family History:  History reviewed. No pertinent family history. Social History:  Social History     Tobacco Use    Smoking status: Never Smoker    Smokeless tobacco: Never Used   Substance Use Topics    Alcohol use: Not Currently    Drug use: Never       Allergies: Allergies   Allergen Reactions    Other Plant, Animal, Environmental Runny Nose, Cough and Other (comments)     Dust, pollen  Reactions: headache, congestion, eyes tearing         Review of Systems   Review of Systems   Constitutional: Negative for fever. HENT: Negative. Eyes: Negative. Respiratory: Positive for shortness of breath. Cardiovascular: Negative for chest pain. Gastrointestinal: Negative for nausea and vomiting. Endocrine: Negative. Musculoskeletal: Positive for arthralgias and gait problem. Skin: Negative. Allergic/Immunologic: Negative. Neurological: Negative for weakness and numbness. Hematological: Negative. Psychiatric/Behavioral: Negative. All Other Systems Negative  Physical Exam     Vitals:    10/03/21 1438 10/03/21 1444 10/03/21 1445 10/03/21 1500   BP:  (!) 137/91 (!) 162/86 (!) 154/82   Pulse:  72 71 68   Resp:  19 17 15   Temp:  97.5 °F (36.4 °C)     SpO2: 98% 100% 100% 100%   Weight:       Height:         Physical Exam  Vitals and nursing note reviewed. Constitutional:       Appearance: She is well-developed. HENT:      Head: Normocephalic and atraumatic. Eyes:      Pupils: Pupils are equal, round, and reactive to light. Neck:      Thyroid: No thyromegaly. Vascular: No JVD. Trachea: No tracheal deviation. Cardiovascular:      Rate and Rhythm: Normal rate and regular rhythm. Heart sounds: Normal heart sounds. No murmur heard. No friction rub. No gallop.     Pulmonary:      Effort: Pulmonary effort is normal. No respiratory distress. Breath sounds: Normal breath sounds. No stridor. No wheezing or rales. Chest:      Chest wall: No tenderness. Abdominal:      General: There is no distension. Palpations: Abdomen is soft. There is no mass. Tenderness: There is no abdominal tenderness. There is no guarding or rebound. Musculoskeletal:         General: Deformity present. No tenderness. Comments: Heel of right cast slightly cracked about 2 cm in length. Cap refill each digit is less than 2 seconds. There is no swelling at the distal foot. Lymphadenopathy:      Cervical: No cervical adenopathy. Skin:     General: Skin is warm and dry. Coloration: Skin is not pale. Findings: No erythema or rash. Neurological:      Mental Status: She is alert and oriented to person, place, and time. Psychiatric:         Behavior: Behavior normal.         Thought Content:  Thought content normal.          Diagnostic Study Results     Labs -     Recent Results (from the past 12 hour(s))   EKG, 12 LEAD, INITIAL    Collection Time: 10/03/21  1:30 PM   Result Value Ref Range    Ventricular Rate 72 BPM    Atrial Rate 72 BPM    P-R Interval 124 ms    QRS Duration 94 ms    Q-T Interval 394 ms    QTC Calculation (Bezet) 431 ms    Calculated P Axis 39 degrees    Calculated R Axis -2 degrees    Calculated T Axis -148 degrees    Diagnosis       Normal sinus rhythm  ST & T wave abnormality, consider inferior ischemia  ST & T wave abnormality, consider anterolateral ischemia  Abnormal ECG  When compared with ECG of 14-APR-2021 11:03,  Inverted T waves have replaced nonspecific T wave abnormality in Anterior   leads     CBC WITH AUTOMATED DIFF    Collection Time: 10/03/21  1:41 PM   Result Value Ref Range    WBC 5.2 4.6 - 13.2 K/uL    RBC 2.97 (L) 4.20 - 5.30 M/uL    HGB 8.0 (L) 12.0 - 16.0 g/dL    HCT 25.6 (L) 35.0 - 45.0 %    MCV 86.2 78.0 - 100.0 FL    MCH 26.9 24.0 - 34.0 PG    MCHC 31.3 31.0 - 37.0 g/dL RDW 15.5 (H) 11.6 - 14.5 %    PLATELET 867 595 - 435 K/uL    MPV 11.7 9.2 - 11.8 FL    NEUTROPHILS 66 40 - 73 %    LYMPHOCYTES 25 21 - 52 %    MONOCYTES 6 3 - 10 %    EOSINOPHILS 3 0 - 5 %    BASOPHILS 0 0 - 2 %    ABS. NEUTROPHILS 3.4 1.8 - 8.0 K/UL    ABS. LYMPHOCYTES 1.3 0.9 - 3.6 K/UL    ABS. MONOCYTES 0.3 0.05 - 1.2 K/UL    ABS. EOSINOPHILS 0.2 0.0 - 0.4 K/UL    ABS. BASOPHILS 0.0 0.0 - 0.1 K/UL    DF MANUAL      PLATELET COMMENTS ADEQUATE PLATELETS      RBC COMMENTS ANISOCYTOSIS  1+        RBC COMMENTS TARGET CELLS  1+        RBC COMMENTS HYPOCHROMIA  SLIGHT  POLYCHROMASIA  SLIGHT        WBC COMMENTS 1 nRBC  WBC    METABOLIC PANEL, COMPREHENSIVE    Collection Time: 10/03/21  1:41 PM   Result Value Ref Range    Sodium 143 136 - 145 mmol/L    Potassium 3.2 (L) 3.5 - 5.5 mmol/L    Chloride 106 100 - 111 mmol/L    CO2 25 21 - 32 mmol/L    Anion gap 12 3.0 - 18 mmol/L    Glucose 92 74 - 99 mg/dL    BUN 43 (H) 7.0 - 18 MG/DL    Creatinine 3.50 (H) 0.6 - 1.3 MG/DL    BUN/Creatinine ratio 12 12 - 20      GFR est AA 16 (L) >60 ml/min/1.73m2    GFR est non-AA 14 (L) >60 ml/min/1.73m2    Calcium 9.1 8.5 - 10.1 MG/DL    Bilirubin, total 0.4 0.2 - 1.0 MG/DL    ALT (SGPT) 13 13 - 56 U/L    AST (SGOT) 18 10 - 38 U/L    Alk.  phosphatase 114 45 - 117 U/L    Protein, total 7.8 6.4 - 8.2 g/dL    Albumin 3.9 3.4 - 5.0 g/dL    Globulin 3.9 2.0 - 4.0 g/dL    A-G Ratio 1.0 0.8 - 1.7     NT-PRO BNP    Collection Time: 10/03/21  1:41 PM   Result Value Ref Range    NT pro- 0 - 900 PG/ML   TROPONIN I    Collection Time: 10/03/21  1:41 PM   Result Value Ref Range    Troponin-I, QT <0.02 0.0 - 0.045 NG/ML   D DIMER    Collection Time: 10/03/21  1:41 PM   Result Value Ref Range    D DIMER 2.37 (H) <0.46 ug/ml(FEU)   COVID-19 RAPID TEST    Collection Time: 10/03/21  1:41 PM   Result Value Ref Range    Specimen source Nasopharyngeal      COVID-19 rapid test Not detected NOTD         Radiologic Studies -   NM LUNG PERFUSION W VENT   Final Result      PE absent. XR CHEST PA LAT   Final Result   No acute process      US RETROPERITONEUM COMP    (Results Pending)     CT Results  (Last 48 hours)    None        CXR Results  (Last 48 hours)               10/03/21 1415  XR CHEST PA LAT Final result    Impression:  No acute process       Narrative:  EXAM:  PA and Lateral Chest X-ray        INDICATION: Shortness of breath       COMPARISON: CT dated April 14, 2021       ___________       FINDINGS: Heart and mediastinal contours are within normal limits. Lungs are   clear of active disease. There are no pleural effusions. No acute osseous   findings. _____________                       Medical Decision Making   I am the first provider for this patient. I reviewed the vital signs, available nursing notes, past medical history, past surgical history, family history and social history. Vital Signs-Reviewed the patient's vital signs. Records Reviewed: Nursing Notes    Procedures:  EKG    Date/Time: 10/3/2021 1:30 PM  Performed by: PILY Cardoso  Authorized by: PILY Cardoso     ECG reviewed by ED Physician in the absence of a cardiologist: yes    Interpretation:     Interpretation: abnormal    Rate:     ECG rate:  72    ECG rate assessment: normal    Rhythm:     Rhythm: sinus rhythm    Ectopy:     Ectopy: none    QRS:     QRS axis:  Normal    QRS intervals:  Normal  Conduction:     Conduction: normal    ST segments:     ST segments:  Normal  T waves:     T waves: inverted      Inverted:  V3, V4, V5 and V6        Provider Notes (Medical Decision Making): pt ha no PE on VQ; MAGALI. Give IVF, admit to hospitalist.  Pro BNP trop negative  Her right heel cast is split but is asymptomatic for discomfort w/elevation of leg.   Do not feel clinically she has concern for infection or poorly fitting cast.  Admit to hospitalist.    MED RECONCILIATION:  Current Facility-Administered Medications   Medication Dose Route Frequency    sodium chloride (NS) flush 5-40 mL  5-40 mL IntraVENous Q8H    sodium chloride (NS) flush 5-40 mL  5-40 mL IntraVENous PRN    acetaminophen (TYLENOL) tablet 650 mg  650 mg Oral Q6H PRN    Or    acetaminophen (TYLENOL) suppository 650 mg  650 mg Rectal Q6H PRN    polyethylene glycol (MIRALAX) packet 17 g  17 g Oral DAILY PRN    ondansetron (ZOFRAN ODT) tablet 4 mg  4 mg Oral Q8H PRN    Or    ondansetron (ZOFRAN) injection 4 mg  4 mg IntraVENous Q6H PRN    0.9% sodium chloride infusion  75 mL/hr IntraVENous CONTINUOUS    heparin (porcine) injection 5,000 Units  5,000 Units SubCUTAneous Q8H    [START ON 10/4/2021] amLODIPine (NORVASC) tablet 5 mg  5 mg Oral DAILY    aspirin chewable tablet 81 mg  81 mg Oral BID    potassium chloride (K-DUR, KLOR-CON) SR tablet 40 mEq  40 mEq Oral NOW     Current Outpatient Medications   Medication Sig    aspirin 81 mg chewable tablet Take 81 mg by mouth two (2) times a day.  oxyCODONE-acetaminophen (PERCOCET) 5-325 mg per tablet Take 1 Tablet by mouth every four (4) hours as needed for Pain.  amLODIPine (NORVASC) 5 mg tablet Take 5 mg by mouth daily.  carvedilol (COREG) 12.5 mg tablet Take 12.5 mg by mouth two (2) times daily (with meals).  cyanocobalamin 1,000 mcg tablet Take 1,000 mcg by mouth daily.  furosemide (LASIX) 20 mg tablet Take 20 mg by mouth daily.  hydroxychloroquine (PLAQUENIL) 200 mg tablet Take 200 mg by mouth daily.  leflunomide (ARAVA) 20 mg tablet Take 20 mg by mouth daily.  lovastatin (MEVACOR) 20 mg tablet Take 20 mg by mouth nightly.  montelukast (SINGULAIR) 10 mg tablet Take 10 mg by mouth daily.  DEXTRAN/HYPROMELLOSE/GLYCERIN (ARTIFICIAL TEAR,DXTRN-HPM-GLY, OP) Apply  to eye.  pantoprazole (PROTONIX) 40 mg tablet Take 40 mg by mouth daily.  cyclobenzaprine (FLEXERIL) 5 mg tablet Take 1 Tab by mouth every eight (8) hours as needed for Muscle Spasm(s).        Disposition:  admit    Follow-up Information    None Current Discharge Medication List            Diagnosis     Clinical Impression:   1.  Acute kidney injury (HCC)    2. Pain of right heel

## 2021-10-04 ENCOUNTER — APPOINTMENT (OUTPATIENT)
Dept: NON INVASIVE DIAGNOSTICS | Age: 56
End: 2021-10-04
Attending: INTERNAL MEDICINE
Payer: MEDICAID

## 2021-10-04 ENCOUNTER — APPOINTMENT (OUTPATIENT)
Dept: ULTRASOUND IMAGING | Age: 56
End: 2021-10-04
Attending: INTERNAL MEDICINE
Payer: MEDICAID

## 2021-10-04 ENCOUNTER — APPOINTMENT (OUTPATIENT)
Dept: CT IMAGING | Age: 56
End: 2021-10-04
Attending: HOSPITALIST
Payer: MEDICAID

## 2021-10-04 PROBLEM — N18.30 ACUTE RENAL FAILURE SUPERIMPOSED ON STAGE 3 CHRONIC KIDNEY DISEASE (HCC): Status: ACTIVE | Noted: 2021-10-03

## 2021-10-04 LAB
ALBUMIN SERPL-MCNC: 3 G/DL (ref 3.4–5)
ALBUMIN/GLOB SERPL: 0.9 {RATIO} (ref 0.8–1.7)
ALP SERPL-CCNC: 91 U/L (ref 45–117)
ALT SERPL-CCNC: 11 U/L (ref 13–56)
ANION GAP SERPL CALC-SCNC: 11 MMOL/L (ref 3–18)
AST SERPL-CCNC: 16 U/L (ref 10–38)
BASOPHILS # BLD: 0 K/UL (ref 0–0.1)
BASOPHILS NFR BLD: 0 % (ref 0–2)
BILIRUB SERPL-MCNC: 0.3 MG/DL (ref 0.2–1)
BNP SERPL-MCNC: 306 PG/ML (ref 0–900)
BUN SERPL-MCNC: 39 MG/DL (ref 7–18)
BUN/CREAT SERPL: 13 (ref 12–20)
CALCIUM SERPL-MCNC: 9 MG/DL (ref 8.5–10.1)
CHLORIDE SERPL-SCNC: 110 MMOL/L (ref 100–111)
CK SERPL-CCNC: 156 U/L (ref 26–192)
CO2 SERPL-SCNC: 24 MMOL/L (ref 21–32)
CREAT SERPL-MCNC: 2.94 MG/DL (ref 0.6–1.3)
CREAT UR-MCNC: 123 MG/DL (ref 30–125)
DIFFERENTIAL METHOD BLD: ABNORMAL
ECHO AV ANNULUS DIAM: 2.97 CM
ECHO AV MEAN GRADIENT: 5.07 MMHG
ECHO AV PEAK GRADIENT: 9.74 MMHG
ECHO AV PEAK VELOCITY: 156 CM/S
ECHO AV VTI: 30.77 CM
ECHO LA VOL 4C: 31.08 ML (ref 22–52)
ECHO LV E' LATERAL VELOCITY: 14 CM/S
ECHO LV E' SEPTAL VELOCITY: 9 CM/S
ECHO LV EDV A2C: 47.78 ML
ECHO LV EDV A4C: 101.72 ML
ECHO LV EDV BP: 74.8 ML (ref 56–104)
ECHO LV EJECTION FRACTION A2C: 80 %
ECHO LV EJECTION FRACTION A4C: 65 %
ECHO LV EJECTION FRACTION BIPLANE: 75.5 % (ref 55–100)
ECHO LV ESV A2C: 9.67 ML
ECHO LV ESV A4C: 35.32 ML
ECHO LV ESV BP: 18.33 ML (ref 19–49)
ECHO LV INTERNAL DIMENSION DIASTOLIC: 5.49 CM (ref 3.9–5.3)
ECHO LV INTERNAL DIMENSION SYSTOLIC: 3.87 CM
ECHO LV IVSD: 0.96 CM (ref 0.6–0.9)
ECHO LV MASS 2D: 228.2 G (ref 67–162)
ECHO LV MASS INDEX 2D: 100.1 G/M2 (ref 43–95)
ECHO LV POSTERIOR WALL DIASTOLIC: 1.15 CM (ref 0.6–0.9)
ECHO LVOT PEAK GRADIENT: 9.16 MMHG
ECHO LVOT PEAK VELOCITY: 151 CM/S
ECHO LVOT SV: 0 ML
ECHO LVOT SV: 38.1 ML
ECHO LVOT VTI: 26.46 CM
ECHO MV A VELOCITY: 82 CM/S
ECHO MV AREA PHT: 3.49 CM2
ECHO MV E DECELERATION TIME (DT): 217.36 MS
ECHO MV E VELOCITY: 74 CM/S
ECHO MV E/A RATIO: 0.9
ECHO MV E/E' LATERAL: 5.29
ECHO MV E/E' RATIO (AVERAGED): 6.75
ECHO MV E/E' SEPTAL: 8.22
ECHO MV PRESSURE HALF TIME (PHT): 63.03 MS
ECHO RA AREA 4C: 12.97 CM2
ECHO RV INTERNAL DIMENSION: 3.4 CM
EOSINOPHIL # BLD: 0.1 K/UL (ref 0–0.4)
EOSINOPHIL NFR BLD: 3 % (ref 0–5)
ERYTHROCYTE [DISTWIDTH] IN BLOOD BY AUTOMATED COUNT: 15.6 % (ref 11.6–14.5)
GLOBULIN SER CALC-MCNC: 3.2 G/DL (ref 2–4)
GLUCOSE SERPL-MCNC: 77 MG/DL (ref 74–99)
HCT VFR BLD AUTO: 22.5 % (ref 35–45)
HGB BLD-MCNC: 7 G/DL (ref 12–16)
LVOT MG: 4.37 MMHG
LYMPHOCYTES # BLD: 1.2 K/UL (ref 0.9–3.6)
LYMPHOCYTES NFR BLD: 26 % (ref 21–52)
MAGNESIUM SERPL-MCNC: 1.6 MG/DL (ref 1.6–2.6)
MCH RBC QN AUTO: 26.6 PG (ref 24–34)
MCHC RBC AUTO-ENTMCNC: 31.1 G/DL (ref 31–37)
MCV RBC AUTO: 85.6 FL (ref 78–100)
MONOCYTES # BLD: 0.7 K/UL (ref 0.05–1.2)
MONOCYTES NFR BLD: 16 % (ref 3–10)
NEUTS SEG # BLD: 2.4 K/UL (ref 1.8–8)
NEUTS SEG NFR BLD: 54 % (ref 40–73)
PHOSPHATE SERPL-MCNC: 3.8 MG/DL (ref 2.5–4.9)
PLATELET # BLD AUTO: 229 K/UL (ref 135–420)
PMV BLD AUTO: 12.1 FL (ref 9.2–11.8)
POTASSIUM SERPL-SCNC: 3.4 MMOL/L (ref 3.5–5.5)
PROT SERPL-MCNC: 6.2 G/DL (ref 6.4–8.2)
PROT UR-MCNC: 41 MG/DL
PROT/CREAT UR-RTO: 0.3
RBC # BLD AUTO: 2.63 M/UL (ref 4.2–5.3)
SODIUM SERPL-SCNC: 145 MMOL/L (ref 136–145)
T4 FREE SERPL-MCNC: 1.1 NG/DL (ref 0.7–1.5)
TSH SERPL DL<=0.05 MIU/L-ACNC: 2.07 UIU/ML (ref 0.36–3.74)
WBC # BLD AUTO: 4.5 K/UL (ref 4.6–13.2)

## 2021-10-04 PROCEDURE — 76770 US EXAM ABDO BACK WALL COMP: CPT

## 2021-10-04 PROCEDURE — C8929 TTE W OR WO FOL WCON,DOPPLER: HCPCS

## 2021-10-04 PROCEDURE — 84443 ASSAY THYROID STIM HORMONE: CPT

## 2021-10-04 PROCEDURE — 74011250637 HC RX REV CODE- 250/637: Performed by: HOSPITALIST

## 2021-10-04 PROCEDURE — 96372 THER/PROPH/DIAG INJ SC/IM: CPT

## 2021-10-04 PROCEDURE — 82550 ASSAY OF CK (CPK): CPT

## 2021-10-04 PROCEDURE — 84100 ASSAY OF PHOSPHORUS: CPT

## 2021-10-04 PROCEDURE — 83880 ASSAY OF NATRIURETIC PEPTIDE: CPT

## 2021-10-04 PROCEDURE — 82436 ASSAY OF URINE CHLORIDE: CPT

## 2021-10-04 PROCEDURE — 85025 COMPLETE CBC W/AUTO DIFF WBC: CPT

## 2021-10-04 PROCEDURE — 74011250637 HC RX REV CODE- 250/637: Performed by: INTERNAL MEDICINE

## 2021-10-04 PROCEDURE — 84439 ASSAY OF FREE THYROXINE: CPT

## 2021-10-04 PROCEDURE — 84480 ASSAY TRIIODOTHYRONINE (T3): CPT

## 2021-10-04 PROCEDURE — 74011250636 HC RX REV CODE- 250/636: Performed by: INTERNAL MEDICINE

## 2021-10-04 PROCEDURE — 84300 ASSAY OF URINE SODIUM: CPT

## 2021-10-04 PROCEDURE — 99218 HC RM OBSERVATION: CPT

## 2021-10-04 PROCEDURE — 36415 COLL VENOUS BLD VENIPUNCTURE: CPT

## 2021-10-04 PROCEDURE — 83735 ASSAY OF MAGNESIUM: CPT

## 2021-10-04 PROCEDURE — 80053 COMPREHEN METABOLIC PANEL: CPT

## 2021-10-04 PROCEDURE — 84156 ASSAY OF PROTEIN URINE: CPT

## 2021-10-04 PROCEDURE — 74176 CT ABD & PELVIS W/O CONTRAST: CPT

## 2021-10-04 RX ORDER — POTASSIUM CHLORIDE 20 MEQ/1
20 TABLET, EXTENDED RELEASE ORAL
Status: COMPLETED | OUTPATIENT
Start: 2021-10-04 | End: 2021-10-04

## 2021-10-04 RX ADMIN — Medication 10 ML: at 06:04

## 2021-10-04 RX ADMIN — OXYCODONE AND ACETAMINOPHEN 2 TABLET: 5; 325 TABLET ORAL at 14:36

## 2021-10-04 RX ADMIN — OXYCODONE AND ACETAMINOPHEN 2 TABLET: 5; 325 TABLET ORAL at 21:20

## 2021-10-04 RX ADMIN — Medication 10 ML: at 14:24

## 2021-10-04 RX ADMIN — HEPARIN SODIUM 5000 UNITS: 5000 INJECTION INTRAVENOUS; SUBCUTANEOUS at 21:21

## 2021-10-04 RX ADMIN — SODIUM CHLORIDE 75 ML/HR: 900 INJECTION, SOLUTION INTRAVENOUS at 17:52

## 2021-10-04 RX ADMIN — HEPARIN SODIUM 5000 UNITS: 5000 INJECTION INTRAVENOUS; SUBCUTANEOUS at 14:24

## 2021-10-04 RX ADMIN — HEPARIN SODIUM 5000 UNITS: 5000 INJECTION INTRAVENOUS; SUBCUTANEOUS at 06:05

## 2021-10-04 RX ADMIN — POTASSIUM CHLORIDE 20 MEQ: 20 TABLET, EXTENDED RELEASE ORAL at 15:42

## 2021-10-04 RX ADMIN — ASPIRIN 81 MG: 81 TABLET, CHEWABLE ORAL at 21:21

## 2021-10-04 RX ADMIN — ASPIRIN 81 MG: 81 TABLET, CHEWABLE ORAL at 08:23

## 2021-10-04 RX ADMIN — AMLODIPINE BESYLATE 5 MG: 5 TABLET ORAL at 08:23

## 2021-10-04 RX ADMIN — OXYCODONE AND ACETAMINOPHEN 1 TABLET: 5; 325 TABLET ORAL at 00:01

## 2021-10-04 RX ADMIN — OXYCODONE AND ACETAMINOPHEN 2 TABLET: 5; 325 TABLET ORAL at 08:23

## 2021-10-04 RX ADMIN — PERFLUTREN 1 ML: 6.52 INJECTION, SUSPENSION INTRAVENOUS at 09:41

## 2021-10-04 NOTE — PROGRESS NOTES
2020: Bedside and Verbal shift change report given to Alma Joshua RN (oncoming nurse) by Jack Miller RN (offgoing nurse). Report included the following information SBAR, Kardex, ED Summary, Intake/Output, MAR and Recent Results. Assumed care of patient. Patient resting in bed with significant other at bedside. No needs expressed at this time. 2129: Patient medicated per MAR. No other needs expressed. 0000: Assisted patient to bathroom. Patient voided. Patient medicated for pain per MAR. No other needs expressed     0600: Patient assisted to bathroom x1 assist. Medicated per MAR. No other needs expressed. 0720: Bedside and Verbal shift change report given to Brad Peterson RN (oncoming nurse) by Alma Joshua RN (offgoing nurse). Report included the following information SBAR, Kardex, ED Summary, Intake/Output, MAR and Recent Results.      Patient Vitals for the past 12 hrs:   Temp Pulse Resp BP SpO2   10/04/21 0724 97.9 °F (36.6 °C) 79 15 (!) 140/79 100 %   10/04/21 0320 98.1 °F (36.7 °C) 79 15 136/70 96 %   10/03/21 2250 98.3 °F (36.8 °C) 75 14 (!) 149/75 100 %

## 2021-10-04 NOTE — PROGRESS NOTES
Reason for Admission:  C/o SOB abd cast problems                     RUR Score:     4               Plan for utilizing home health:   TBD    PCP: First and Last name:  OtherShantell MD     Name of Practice: [facility]   Are you a current patient: Yes/No:    Approximate date of last visit:    Can you participate in a virtual visit with your PCP:                     Current Advanced Directive/Advance Care Plan: Full Code      Healthcare Decision Maker:   Click here to complete [address] including selection of the Healthcare Decision Maker Relationship (ie \"Primary\")   Chicho Campoverde- Daughter                    Transition of Care Plan:  Chart reviewed, per ED note pt arrived with c/o worsening SOB and right ankle cast cracking on heel area, Hx includes HTN, anemia, recent right [facility] at [facility], cm did speak with pt via phone conversation, pt independent with daughters assistance if needed, pt does use a knee scooter to get around with,when discharged pt stated she was told by [facility] they will fit her in they're schedule regarding her cast, cm will cont to review and remain available for d/c planning.

## 2021-10-04 NOTE — PROGRESS NOTES
Bedside and verbal shift change report recieved from Cisco Marie, [facility] (offgoing nurse) given to Raegan Peterson RN (oncoming nurse). Report included the following information SBAR, Kardex, Intake/Output, MAR and Recent Results       Bedside and verbal shift change report given to Masha Garza (oncoming nurse) by Efren Bell RN (offgoing nurse).  Report included the following information SBAR, Kardex, Intake/Output, MAR and Recent Results

## 2021-10-04 NOTE — ROUTINE PROCESS
TRANSFER - IN REPORT:    Verbal report received from IDALIA Peralta RN(name) on Tierney Mercado  being received from ED (unit) for routine progression of care      Report consisted of patients Situation, Background, Assessment and Recommendations(SBAR). Information from the following report(s) SBAR was reviewed with the receiving nurse. Opportunity for questions and clarification was provided. Assessment completed upon patients arrival to unit and care assumed.

## 2021-10-04 NOTE — PROGRESS NOTES
Hospitalist Progress Note-critical care note     Patient: Elton Palmer MRN: [id number]  CSN: [id number]    YOB: 1965  Age: 64 y.o. Sex: female    DOA: 10/3/2021 LOS:  LOS: 1 day            Chief complaint: htn, hypokalemia, wiliam on ckd3, pain in heel     Assessment/Plan         Hospital Problems  Never Reviewed        Codes Class Noted POA    Hypertension ICD-10-CM: I10  ICD-9-CM: 401.9  Unknown Unknown        Hypokalemia ICD-10-CM: E87.6  ICD-9-CM: 276.8  Unknown Unknown        * (Principal) Acute renal failure superimposed on stage 3 chronic kidney disease ([facility] [address] ICD-10-CM: N17.9, N18.30  ICD-9-CM: 584.9, 585.3  10/3/2021         Pain of right heel ICD-10-CM: M79.671  ICD-9-CM: 729.5  10/3/2021 Unknown              Acute on ckd3   She reported  She f[facility]ist at [facility]   Renal ultrasound abnormal,   Will have ct   Nephrologist consult   Continue iv hydration      Right lower extremity/pelvic pain  Recent surgery from [facility]   Recommend to set up earlier appointment with [facility]   Continue cast        Hypertension  On norvasc,      Rheumatoid arthritis   Stable now      Elevated D-dimer  V/q scan negative   Will have pvl -do the veins above the cast          Anemia  Will continue monitoring  So far no bleeding reported   Repeat h/h      Status post recent ankle fusion surgery at [facility] about a month ago with the request 2 weeks ago   need to f/u as out -pt     Shortness of breath  Reported resolved, echo done still pending   cxr clear      Hypokalemia  Replacement        Subjective the heel part of cast broke, I will have appointment with [facility] at the beginning of Nov.     The Significant was at the bedside     Disposition :tbd   Review of systems:    General: No fevers or chills. Cardiovascular: No chest pain or pressure. No palpitations. Pulmonary: No shortness of breath. Gastrointestinal: No nausea, vomiting.      Vital signs/Intake and Output:  Visit Vitals  BP (!) 140/79   Pulse 79   Temp 97.9 °F (36.6 °C)   Resp 15   Ht 5' 9\" (1.753 m)   Wt 114.8 kg (253 lb)   SpO2 100%   BMI 37.36 kg/m²     Current Shift:  No intake/output data recorded. Last three shifts:  10/02 1901 - 10/04 0700  In: 478.8 [I.V.:478.8]  Out: -     Physical Exam:  General: WD, WN. Alert, cooperative, no acute distress    HEENT: NC, Atraumatic. PERRLA, anicteric sclerae. Lungs: CTA Bilaterally. No Wheezing/Rhonchi/Rales. Heart:  Regular  rhythm,  No murmur, No Rubs, No Gallops  Abdomen: Soft, Non distended, Non tender. +Bowel sounds,   Extremities: No c/c. Rt leg cast noted, leg not swelling   Psych:   Not anxious or agitated. Neurologic:  No acute neurological deficit. Labs: Results:       Chemistry Recent Labs     10/04/21  0425 10/03/21  1341   GLU 77 92    143   K 3.4* 3.2*    106   CO2 24 25   BUN 39* 43*   CREA 2.94* 3.50*   CA 9.0 9.1   AGAP 11 12   BUCR 13 12   AP 91 114   TP 6.2* 7.8   ALB 3.0* 3.9   GLOB 3.2 3.9   AGRAT 0.9 1.0      CBC w/Diff Recent Labs     10/04/21  0425 10/03/21  1341   WBC 4.5* 5.2   RBC 2.63* 2.97*   HGB 7.0* 8.0*   HCT 22.5* 25.6*    273   GRANS 54 66   LYMPH 26 25   EOS 3 3      Cardiac Enzymes Recent Labs     10/04/21  0425 10/03/21  1341    201*      Coagulation No results for input(s): PTP, INR, APTT, INREXT, INREXT in the last 72 hours. Lipid Panel No results found for: CHOL, CHOLPOCT, CHOLX, CHLST, CHOLV, 731629, HDL, HDLP, LDL, LDLC, DLDLP, 499389, VLDLC, VLDL, TGLX, TRIGL, TRIGP, TGLPOCT, CHHD, CHHDX   BNP No results for input(s): BNPP in the last 72 hours.    Liver Enzymes Recent Labs     10/04/21  0425   TP 6.2*   ALB 3.0*   AP 91      Thyroid Studies Lab Results   Component Value Date/Time    TSH 2.07 10/04/2021 04:25 AM        Procedures/imaging: see electronic medical records for all procedures/Xrays and details which were not copied into this note but were reviewed prior to creation of Plan    XR CHEST PA LAT    Result Date: 10/3/2021  EXAM:  PA and Lateral Chest X-ray INDICATION: Shortness of breath COMPARISON: CT dated April 14, 2021 ___________ FINDINGS: Heart and mediastinal contours are within normal limits. Lungs are clear of active disease. There are no pleural effusions. No acute osseous findings. _____________      No acute process    NM LUNG PERFUSION W VENT    Addendum Date: 10/3/2021    Addendum: Correction to exam: Perfusion scan    Result Date: 10/3/2021  EXAM: VQ scan INDICATION: Rule out PE COMPARISON: October 3, 2021 TECHNIQUE: 6.6 mCi of Tc 99m MAA was administered for perfusion. Injection site was right antecubital fossa. _______________ FINDINGS: PERFUSION: No perfusion defects seen. _______________     PE absent.       Lyndsay Travis MD

## 2021-10-04 NOTE — PROGRESS NOTES
1907 - Assumed care at this time. 2119 - Assessment completed. Pt A&Ox4, RA. Denies chest pain/SOB. Lung sounds clear. Pain rated 10/10, pain medication administered per MAR. Pt educated on pain mgmt. No concerns voiced. Call bell and telephone within reach, bed in lowest position. 0345 - Per pt heel pain is getting worse and the cast appears to be pushing into her heel even more. Per pt she can be seen today by [facility] but needs to give them an answer by 0800 on an arrival time. Pt wants to make appointment. Hospitalist paged to be informed. 0 - Hospitalist made aware of pt's desire to leave in order to be seen today by [facility].

## 2021-10-04 NOTE — CONSULTS
RENAL CONSULT  10/4/2021    Patient:  Argelia Donovan  :  1965  Gender:  female  MRN #:  [id number]    Reason for Consult: Acute Renal Failure     History of Present Illness: Argelia Donovan is a 64y.o. year old female with H/O CKD , HTN, RA presented with shortness of breath . Reportedly she had  right ankle fusion surgery at [facility] on . Patient states she started feeling weak and shortness of breath about a week ago so she came in to ED     In ED she was found to have acute renal failure with creatinine 3.50 mg/dl   USG retroperitoneum : dilated left renal pelvis and ureter     She says she has Kidney disease for last 10 years and sees Nephrologist in [facility] . Since 21 she has been taking lasix 40 mg daily and motrin 2 tab daily for ankle pain   Denied problems in urination . No other symptoms     Past Medical History:   Diagnosis Date    Acid reflux     Anemia     Heart failure (HCC)     Hypercholesteremia     Hypertension     Migraine     Obese     Rheumatoid arthritis (HCC)      Past Surgical History:   Procedure Laterality Date    HX HYSTERECTOMY      HX ORTHOPAEDIC      bilateral knee replacement    HX ORTHOPAEDIC      cyst removed from wrist    HX ORTHOPAEDIC      rt ankle fusion    HX PARATHYROIDECTOMY       History reviewed. No pertinent family history.   Allergies   Allergen Reactions    Other Plant, Animal, Environmental Runny Nose, Cough and Other (comments)     Dust, pollen  Reactions: headache, congestion, eyes tearing     Current Facility-Administered Medications   Medication Dose Route Frequency Provider Last Rate Last Admin    sodium chloride (NS) flush 5-40 mL  5-40 mL IntraVENous Q8H Guillermina Barrios MD   10 mL at 10/04/21 1424    sodium chloride (NS) flush 5-40 mL  5-40 mL IntraVENous PRN Guillermina Barrios MD        acetaminophen (TYLENOL) tablet 650 mg  650 mg Oral Q6H PRN Guillermina Barrios MD   650 mg at 10/03/21 2129    Or    acetaminophen (TYLENOL) suppository 650 mg  650 mg Rectal Q6H PRN Severo Mcleod MD        polyethylene glycol (MIRALAX) packet 17 g  17 g Oral DAILY PRN Severo Mcleod MD        ondansetron (ZOFRAN ODT) tablet 4 mg  4 mg Oral Q8H PRN Severo Mcleod MD        Or    ondansetron [facility]) injection 4 mg  4 mg IntraVENous Q6H PRN Severo Mcleod MD        0.9% sodium chloride infusion  75 mL/hr IntraVENous CONTINUOUS Severo Mcleod MD 75 mL/hr at 10/03/21 2129 75 mL/hr at 10/03/21 2129    heparin (porcine) injection 5,000 Units  5,000 Units SubCUTAneous Q8H Severo Mcleod MD   5,000 Units at 10/04/21 1424    amLODIPine (NORVASC) tablet 5 mg  5 mg Oral DAILY Severo Mcleod MD   5 mg at 10/04/21 4108    aspirin chewable tablet 81 mg  81 mg Oral BID Severo Mcleod MD   81 mg at 10/04/21 9980    oxyCODONE-acetaminophen (PERCOCET) 5-325 mg per tablet 1-2 Tablet  1-2 Tablet Oral Q6H PRN Martin Trivedi MD   2 Tablet at 10/04/21 1436         Review of Symptoms:     Consitutional Symptoms: No fever, weight loss, weight gain and fatigue  Eyes:- No change in vision , no itching   Ears, Nose, Mouth, Throat:  No neck pain , swelling ,hearing loss and nose bleed. Pulmonary: No cough and shortness of breath . CVS: No  Chest pain , palpitation and orthopnea  GI: No nausea, vomiting, abdominal pain, and blood in stool   - No burning, frequency ,urgency  and difficulty voiding . Neurological:No dizzy ness, syncope, focal weakness and numbness .   Skin : No rash and erythema   Endocrine: No feeling of excessive cold or warmth, hot flushes  Psychiatric: Denied feeling depressed    Objective:    Visit Vitals  BP (!) 151/89 (BP 1 Location: Left arm, BP Patient Position: At rest)   Pulse 76   Temp 97.9 °F (36.6 °C)   Resp 17   Ht 5' 9\" (1.753 m)   Wt 114.8 kg (253 lb)   SpO2 99%   BMI 37.36 kg/m²       Physical Exam:    Pt awake,  alert and comfortable  HEENT: No JVD, anicteric sclera, no neck swelling  Lung: clear to auscultation, no crackles and wheeze  Heart: s1s2 regualr no rubs or murmur  Abdomen: soft, non tender, no guarding, normal bowel sounds. Ext: no edema in left, right ankle has cast   Skin: No rash and erythema  CNS- Oriented to time , place and person     Laboratory Data:    Lab Results   Component Value Date    BUN 39 (H) 10/04/2021    BUN 43 (H) 10/03/2021    BUN 18 04/14/2021     10/04/2021     10/03/2021     04/14/2021    CO2 24 10/04/2021    CO2 25 10/03/2021    CO2 28 04/14/2021     Lab Results   Component Value Date    WBC 4.5 (L) 10/04/2021    HGB 7.0 (L) 10/04/2021    HCT 22.5 (L) 10/04/2021     No components found for: CALCIUM, PHOSPHORUS, MAGNESIUM  No results found for: HDL  No results found for: SPECIMENTYP, TURBIDITY, UGLU    Imaging Reveiwed:    USG retroperitoneum 10/4/21- Dilated right renal pelvis versus complex parapelvic cyst with internal echoes,  correlate with urinalysis and CT for further evaluation.     Dilated left ureter and renal pelvis versus small hydronephrosis    Assessment:  She is 64 yrs lady with CKD , HTN, RA who had ankle surgery last month admitted with SOB and weakness .  D-dimer is positive   She has MAGALI on CKD with peaked creatinine of 3.50 mg/dl     Acute renal Failure is multifactorial - Pre Renal volume depletion in setting of long term NSAIDS and lasix      Plan:    - will get Urine analysis , CPK , urine electrolytes , Urine electrolytes   - Agree to do CT abdomen/pelvsi to characterize hydronephrosis , if she still has hydronephrosis , Urology consult   - Encourage oral hydration   - Hold lasix for now   - Avoid NSAIDS, contrast and nephrotoxin  - strict intake and output   - bladder scan to ensure she is not retaining   - Dose all meds for current eGFR   -Agree to replace oral potassium to keep level at 4 mmol/lt     #HTN- Mostly controlled  On amlodipine 5 mg     # Anemia:  Check iron profile, r/u GI bleed  Transfusion prn to keep Hgb > 7 gram/dl    #BMD- calcium and phos is normal  Will check PTH level         So Pierson MD   [facility].- Nephrology

## 2021-10-05 ENCOUNTER — APPOINTMENT (OUTPATIENT)
Dept: VASCULAR SURGERY | Age: 56
End: 2021-10-05
Attending: HOSPITALIST
Payer: MEDICAID

## 2021-10-05 VITALS
TEMPERATURE: 97.8 F | HEART RATE: 92 BPM | DIASTOLIC BLOOD PRESSURE: 86 MMHG | WEIGHT: 257.94 LBS | RESPIRATION RATE: 20 BRPM | HEIGHT: 69 IN | SYSTOLIC BLOOD PRESSURE: 148 MMHG | OXYGEN SATURATION: 96 % | BODY MASS INDEX: 38.2 KG/M2

## 2021-10-05 LAB
ANION GAP SERPL CALC-SCNC: 10 MMOL/L (ref 3–18)
BUN SERPL-MCNC: 30 MG/DL (ref 7–18)
BUN/CREAT SERPL: 12 (ref 12–20)
CALCIUM SERPL-MCNC: 9.7 MG/DL (ref 8.5–10.1)
CALCIUM SERPL-MCNC: 9.9 MG/DL (ref 8.5–10.1)
CHLORIDE SERPL-SCNC: 113 MMOL/L (ref 100–111)
CHLORIDE UR-SCNC: 22 MMOL/L (ref 55–125)
CO2 SERPL-SCNC: 22 MMOL/L (ref 21–32)
CREAT SERPL-MCNC: 2.57 MG/DL (ref 0.6–1.3)
GLUCOSE SERPL-MCNC: 87 MG/DL (ref 74–99)
IRON SATN MFR SERPL: 24 % (ref 20–50)
IRON SERPL-MCNC: 61 UG/DL (ref 50–175)
POTASSIUM SERPL-SCNC: 3.5 MMOL/L (ref 3.5–5.5)
PTH-INTACT SERPL-MCNC: 108.9 PG/ML (ref 18.4–88)
SODIUM SERPL-SCNC: 145 MMOL/L (ref 136–145)
SODIUM UR-SCNC: 24 MMOL/L (ref 20–110)
T3 SERPL-MCNC: 74 NG/DL (ref 71–180)
TIBC SERPL-MCNC: 252 UG/DL (ref 250–450)

## 2021-10-05 PROCEDURE — 74011250636 HC RX REV CODE- 250/636: Performed by: INTERNAL MEDICINE

## 2021-10-05 PROCEDURE — 99218 HC RM OBSERVATION: CPT

## 2021-10-05 PROCEDURE — 96372 THER/PROPH/DIAG INJ SC/IM: CPT

## 2021-10-05 PROCEDURE — 80048 BASIC METABOLIC PNL TOTAL CA: CPT

## 2021-10-05 PROCEDURE — 36415 COLL VENOUS BLD VENIPUNCTURE: CPT

## 2021-10-05 PROCEDURE — 83970 ASSAY OF PARATHORMONE: CPT

## 2021-10-05 PROCEDURE — 96374 THER/PROPH/DIAG INJ IV PUSH: CPT

## 2021-10-05 PROCEDURE — 74011250637 HC RX REV CODE- 250/637: Performed by: INTERNAL MEDICINE

## 2021-10-05 PROCEDURE — 83540 ASSAY OF IRON: CPT

## 2021-10-05 RX ADMIN — OXYCODONE AND ACETAMINOPHEN 2 TABLET: 5; 325 TABLET ORAL at 04:27

## 2021-10-05 RX ADMIN — HEPARIN SODIUM 5000 UNITS: 5000 INJECTION INTRAVENOUS; SUBCUTANEOUS at 05:41

## 2021-10-05 RX ADMIN — ONDANSETRON 4 MG: 2 INJECTION INTRAMUSCULAR; INTRAVENOUS at 04:27

## 2021-10-05 NOTE — PROGRESS NOTES
Hospitalist Progress Note-critical care note     Patient: Abby Soulier MRN: [id number]  [facility]: [id number]    YOB: 1965  Age: 64 y.o. Sex: female    DOA: 10/3/2021 LOS:  LOS: 1 day            Chief complaint: htn, hypokalemia, wiliam on ckd3, pain in heel     Assessment/Plan         Hospital Problems  Never Reviewed        Codes Class Noted POA    Hypertension ICD-10-CM: I10  ICD-9-CM: 401.9  Unknown Unknown        Hypokalemia ICD-10-CM: E87.6  ICD-9-CM: 276.8  Unknown Unknown        * (Principal) Acute renal failure superimposed on stage 3 chronic kidney disease ([facility] [address] ICD-10-CM: N17.9, N18.30  ICD-9-CM: 584.9, 585.3  10/3/2021         Pain of right heel ICD-10-CM: M79.671  ICD-9-CM: 729.5  10/3/2021 Unknown              Acute on ckd3   Renal function improving slowly   She reported  She f/u with [facility]rologist at [facility] ct abdomen no hydro   Nephrologist f/u   Continue iv hydration      Right lower extremity/pelvic pain  Recent surgery from [facility]   Will go to [facility] today      Hypertension  On norvasc,      Rheumatoid arthritis   Stable now      Elevated D-dimer  V/q scan negative   pvl -do the veins above the cast ordered -not performed          Anemia  Will continue monitoring  So far no bleeding reported   Repeat h/h      Status post recent ankle fusion surgery at [address] [place] [address] about a month ago with the request 2 weeks ago   need to f/u as out -pt     Shortness of breath  Reported resolved, echo done -discussed with pt   cxr clear       Hypokalemia  Replacement        Subjective I have leave to go to [facility] for my legs. The Significant was at the bedside    Pt left hospital with ama   All questions have been answered. 35 total min's spent on patient care including >50% on counseling/coordinating care. Discussed the above assessments. also discussed labs, medications and hospital course    Review of systems:    General: No fevers or chills. Cardiovascular: No chest pain or pressure. No palpitations.    Pulmonary: No shortness of breath. Gastrointestinal: No nausea, vomiting. Vital signs/Intake and Output:  Visit Vitals  BP (!) 148/86 (BP 1 Location: Left upper arm, BP Patient Position: Sitting)   Pulse 92   Temp 97.8 °F (36.6 °C)   Resp 20   Ht 5' 9\" (1.753 m)   Wt 117 kg (257 lb 15 oz)   SpO2 96%   BMI 38.09 kg/m²     Current Shift:  No intake/output data recorded. Last three shifts:  10/03 1901 - 10/05 0700  In: 1662.5 [P.O.:100; I.V.:1562.5]  Out: 400 [Urine:400]    Physical Exam:  General: WD, WN. Alert, cooperative, no acute distress    HEENT: NC, Atraumatic. PERRLA, anicteric sclerae. Lungs: CTA Bilaterally. No Wheezing/Rhonchi/Rales. Heart:  Regular  rhythm,  No murmur, No Rubs, No Gallops  Abdomen: Soft, Non distended, Non tender. +Bowel sounds,   Extremities: No c/c. Rt leg cast noted, leg not swelling   Psych:   Not anxious or agitated. Neurologic:  No acute neurological deficit. Labs: Results:       Chemistry Recent Labs     10/05/21  0300 10/04/21  0425 10/03/21  1341   GLU 87 77 92    145 143   K 3.5 3.4* 3.2*   * 110 106   CO2 22 24 25   BUN 30* 39* 43*   CREA 2.57* 2.94* 3.50*   CA 9.7 9.0 9.1   AGAP 10 11 12   BUCR 12 13 12   AP  --  91 114   TP  --  6.2* 7.8   ALB  --  3.0* 3.9   GLOB  --  3.2 3.9   AGRAT  --  0.9 1.0      CBC w/Diff Recent Labs     10/04/21  0425 10/03/21  1341   WBC 4.5* 5.2   RBC 2.63* 2.97*   HGB 7.0* 8.0*   HCT 22.5* 25.6*    273   GRANS 54 66   LYMPH 26 25   EOS 3 3      Cardiac Enzymes Recent Labs     10/04/21  0425 10/03/21  1341    201*      Coagulation No results for input(s): PTP, INR, APTT, INREXT, INREXT in the last 72 hours. Lipid Panel No results found for: CHOL, CHOLPOCT, CHOLX, CHLST, CHOLV, 569585, HDL, HDLP, LDL, LDLC, DLDLP, 588963, VLDLC, VLDL, TGLX, TRIGL, TRIGP, TGLPOCT, CHHD, CHHDX   BNP No results for input(s): BNPP in the last 72 hours.    Liver Enzymes Recent Labs     10/04/21  0425   TP 6.2*   ALB 3.0*   AP 91 Thyroid Studies Lab Results   Component Value Date/Time    TSH 2.07 10/04/2021 04:25 AM        Procedures/imaging: see electronic medical records for all procedures/Xrays and details which were not copied into this note but were reviewed prior to creation of Plan    XR CHEST PA LAT    Result Date: 10/3/2021  EXAM:  PA and Lateral Chest X-ray INDICATION: Shortness of breath COMPARISON: CT dated April 14, 2021 ___________ FINDINGS: Heart and mediastinal contours are within normal limits. Lungs are clear of active disease. There are no pleural effusions. No acute osseous findings. _____________      No acute process    NM LUNG PERFUSION W VENT    Addendum Date: 10/3/2021    Addendum: Correction to exam: Perfusion scan    Result Date: 10/3/2021  EXAM: VQ scan INDICATION: Rule out PE COMPARISON: October 3, 2021 TECHNIQUE: 6.6 mCi of Tc 99m MAA was administered for perfusion. Injection site was right antecubital fossa. _______________ FINDINGS: PERFUSION: No perfusion defects seen. _______________     PE absent.       Ron Garcia MD

## 2021-10-05 NOTE — PROGRESS NOTES
Patient is alert and oriented, and able to clearly report the risks of leaving the hospital w/o further intervention. The risks of leaving the hospital AMA and benefits of further treatment discussed with patient. The patient's decisional capacity is intact and clearly understands. The patient is fully aware of the risks including, but not limited to the following: worsening symptoms, decline in functional status, and even the possiblity of death. The patient declines the recommendations at this time. The patient clearly understands that she/he may return to the ED at any time for further evaluation and treatment. The patient is adamant of leaving the hospital AMA.

## 2021-10-05 NOTE — ROUTINE PROCESS
Bedside and Verbal shift change report given to Isaac Cannon RN by Talia Cordoba RN. Report included the following information SBAR, Kardex, Intake/Output and MAR.

## 2021-10-05 NOTE — DISCHARGE SUMMARY
Discharge summary     Patient is 63-year old female with history of right ankle fusion surgery at [facility] on September 1. Patient had recast 2 weeks ago but since then she is having ankle pain this was initially thought due to a crack in it. Patient states she started feeling weak and shortness of breath about a week ago hence she was brought to the emergency room. Patient states she does have a history of congestive heart failure but details not available. Upon evaluation in emergency room patient was found to have creatinine of 3.5. Please note to not have all previous creatinine available. She denies having any history of kidney problem. Since she was admitted, iv fluid was started for wiliam on ckd3, bp was controlled per norvasc, renal ultrasound done and hydronephrosis was r/o  Per ct.  Pt refuses to continue to stay and signed Preet Flores left the hospital.

## 2021-10-05 NOTE — PROGRESS NOTES
Bedside and verbal shift change report given to Willie Lowe RN (on coming nurse) by Ankita Barrientos RN (off going nurse). Report included the following information SBAR, Kardex, OR Summary, Intake/Output and MAR.    0850 Pt stated \" Im leaving because I have to go to [facility] because I need them to take a look at my leg, so dont worry about digging into my care today. \"    [id number] Page MD     9210 Explain the risk to pt about leaving AMA. Pt stated \" I understand,\" pt signed AMA documentation. D/c IV clean and dry. Properly discarded armbands. Jr NICOLAS with pt at Bedside.

## 2021-11-22 ENCOUNTER — HOSPITAL ENCOUNTER (OUTPATIENT)
Dept: PHYSICAL THERAPY | Age: 56
Discharge: HOME | End: 2021-11-22
Payer: MEDICAID

## 2021-11-22 PROCEDURE — 97162 PT EVAL MOD COMPLEX 30 MIN: CPT

## 2021-11-22 NOTE — PROGRESS NOTES
Physical Therapy Evaluation  - Foot and Ankle      Patient Name: Tierney Mercado  Date:2021  : 1965  [x]  Patient  Verified  Payor: BLUE CROSS MEDICAID / Plan: [facility] GL 2ours Emre Haro / Product Type: Managed Care Medicaid /    In time:9:30  Out time:10:15  Total Treatment Time (min): 45  Visit #: 1 of 16    Medicare/BCBS Only   Total Timed Codes (min):  45 1:1 Treatment Time:  45       Treatment Area: Right foot pain [M79.671]    SUBJECTIVE  Pain Level (0-10 scale): 5/10  [x]constant []intermittent []improving []worsening []no change since onset    Any medication changes, allergies to medications, adverse drug reactions, diagnosis change, or new procedure performed?: [x] No    [] Yes (see summary sheet for update)  Subjective functional status/changes:     PLOF: functionally independent, no AD, active lifestyle; 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 Current symptoms/Complaints: Describes pain as a constant ache with occasional sharp sudden pain; 4/10 at best with Tylenol and heat; 9/10 at worst with activity; pt reports they are unable to sleep through the night secondary to pain; reports she fell off of her knee scooter on 21 while trying to roll over soft ground; states she did not suffer any injuries and was able to get up by herself.   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  Living Situation: Lives in a 2 story home with bedroom on the first floor; states she has 4 small steps with ramps in between to her front door, plus one large step to the street; does has portable ramp that she is able to put over the large step; lives with her daughter who is able to help out at home when she is not working   Pt Goals:  \"To get back to walking again\"  Barriers: [x]pain []financial []time []transportation []other  Motivation: Evidenced by participation in eval and desire to return to PLOF  Substance use: []Alcohol []Tobacco []other:   Cognition: A & O x 3        OBJECTIVE    45 min [x]Eval                  []Re-Eval         With   [x] TE   [] TA   [] neuro   [] other: Patient Education: [x] Review HEP    [] Progressed/Changed HEP based on:   [] positioning   [] body mechanics   [] transfers   [] heat/ice application    [] other:        General Evaluation     Observation: 3 inch scar along medial ankle just distal to medial malleous, good closure; 1.5 inch scar on dorsum of foot; good closure; 2 inch scar on lateral malleolus with 2 small areas that are not fully closed; 1 inch scar mid calf, good closure     Gait: [] Normal    [x] Abnormal    [x] Antalgic    [] NWB    Device: Trialed gait with FWW today x15 ft; WBAT right LE with cam boot and gait belt donned; CGA  Description: Patient demonstrating step-to gait pattern with decreased speed and bilat step length     ROM/Strength  [] Unable to assess at this time      AROM        PROM            Strength (1-5)   Left Right Left Right Left  Right   Dorsiflexion 12 -10 -10  5 4   Plantarflexion 50 30 32  5 4   Inversion 15 NT per MD restrictions   5 NT per MD restrictions   Eversion 30 NT per MD restrictions   5 NT per MD restrictions   Great Toe Ext 30 25 NT  5 3   Great Toe Flex 10 10 NT  4+ +          Lower Extremity Left  (0-5) Right (0-5)   Hip Flexion (L1,2) 4+ 4+   Knee Extension (L3,4) 4+ 4   Knee Flexion (S1,2) 4 4   Hip IR 4+ 4   Hip ER 4+ 4   Hip Abduction 4+ 4   Hip Adduction 4+ 4+       Flexibility: [] Unable to assess at this time  Gastroc:    (Left) Tightness [] WNL   [x] Min   [] Mod   [] Severe    (Right) Tightness [] WNL   [] Min   [x] Mod   [] Severe  Soleus:    (Left) Tightness [] WNL   [x] Min   [] Mod   [] Severe    (Right) Tightness [] WNL   [] Min   [x] Mod   [] Severe    Palpation:   Location: Medial incision  Patient's Pain Response: [] Min   [x] Mod   [] Severe  Location: Incision on dorsum of foot  Patient's Pain Response: [] Min   [x] Mod   [] Severe      Optional Tests:  Single Limb Balance: Left = 1 second; Right = unable to perform at this time    Girth:   Left (cm) Right (cm)   Figure 8: 52 56   Midfoot:  20 23.5   Malleoli Level: 26 27   MTH:  19 21       Vasopnuematic compression justification:  Per bilateral girth measures taken and listed above the edema is considered significant and having an impact on the patient's strength, balance, gait, transfers, self care and ADLs     Pain Level (0-10 scale) post treatment: 4/10    ASSESSMENT/Changes in Function: Patient is a 63 yo female who presents to [facility] with c/o right foot and ankle pain. Patient is s/p right subtalar, talonavicular, and 2nd TMT fusion on 9/1/21. Patient presenting today in standard WC, which she primarily uses for community navigation. Patient reports she utilizes a knee scooter in her home, but also has a rollator, axillary crutches, quad cane, and SPC. Patient presenting today in cam boot and is WBAT. Patient demonstrates decreased ROM, decreased strength, impaired posture, impaired gait mechancis, pain and decreased functional mobility tolerance.  Patient is also classified as a falls risk, as evidenced by her single limb balance and recent hx of falling. The listed deficits impair her ability to negotiate stairs, ambulate community distances, and perform ADLs. Trialed ambulation with a FWW with right cam boot donned WBAT right LE with CGA Patient would benefit from skilled physical therapy to address listed deficits, reduce pain, and maximize functional potential.      Patient will continue to benefit from skilled PT services to modify and progress therapeutic interventions, address functional mobility deficits, address ROM deficits, address strength deficits, analyze and address soft tissue restrictions, analyze and cue movement patterns, analyze and modify body mechanics/ergonomics, assess and modify postural abnormalities and instruct in home and community integration to attain remaining goals. [x]  See Plan of Care  []  See progress note/recertification  []  See Discharge Summary         Progress towards goals / Updated goals:  Short Term Goals: To be accomplished in 4 weeks:  1. Patient will be independent and compliant with HEP to progress toward goals and restore functional mobility. Eval Status: issued at [facility]    2. Patient will improve FOTO score by 6 points to improve functional tolerance for exercise. Eval Status: FOTO 49  FOTO score = an established functional score where 100 = no disability    3. Patient will improve pain in right foot to 5/10  to improve activity tolerance and restore prior level of function. Eval Status: 9/10 at worst      4. Pt will have painfree right ankle and great toe AROM with at least 5 degree increase in all planes to aid in functional mechanics for ambulation/ADLs. Eval Status:   AROM         PROM               Left Right Left Right   Dorsiflexion 12 -10 -10    Plantarflexion 50 30 32    Inversion 15 NT per MD restrictions     Eversion 30 NT per MD restrictions     Great Toe Ext 30 25 NT    Great Toe Flex 10 10 NT             Long Term Goals:  To be accomplished in 8 weeks: 1. Patient will improve FOTO score by 12 points to improve functional tolerance for ambulation and ADLs. Eval Status: FOTO 49  FOTO score = an established functional score where 100 = no disability    2. Pt will have painfree right ankle and great toe AROM with at least 15 degree improvement in all planes to aid in functional mechanics for ambulation/ADLs. Eval Status:   AROM          PROM               Left Right Left Right   Dorsiflexion 12 -10 -10    Plantarflexion 50 30 32    Inversion 15 NT per MD restrictions     Eversion 30 NT per MD restrictions     Great Toe Ext 30 25 NT    Great Toe Flex 10 10 NT           3. Pt will have 5/5 LE strength to return to goals of ambulation and performing ADLs. Eval Status:         Strength (1-5)   Left  Right   Dorsiflexion 5 4   Plantarflexion 5 4   Inversion 5 NT per MD restrictions   Eversion 5 NT per MD restrictions   Great Toe Ext 5 3   Great Toe Flex 4+ +     Lower Extremity Left  (0-5) Right (0-5)   Hip Flexion (L1,2) 4+ 4+   Knee Extension (L3,4) 4+ 4   Knee Flexion (S1,2) 4 4   Hip IR 4+ 4   Hip ER 4+ 4   Hip Abduction 4+ 4   Hip Adduction 4+ 4+          4. Patient will ambulate 500ft mod I with LRAD demonstrating symmetrical gait pattern in order to facilitate her ability to ambulate in the community. Eval Status: 15ft with FWW WBAT Right LE with right cam boot and gait belt donned with CGA    5. Patient will improve pain in right foot and ankle to 1-2/10 at worst to improve activity tolerance and restore prior level of function.    Eval Status: 9/10 at worst    PLAN  [x]  Upgrade activities as tolerated     [x]  Continue plan of care  []  Update interventions per flow sheet       []  Discharge due to:_  []  Other:_      Ethan Faust, PT 11/22/2021  9:27 AM

## 2021-11-22 NOTE — PROGRESS NOTES
[facility]  [address] 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 Assessment/ key information: Patient is a 63 yo female who presents to [facility] with c/o right foot and ankle pain. Patient is s/p right subtalar, talonavicular, and 2nd TMT fusion on 21. Patient presenting today in standard WC, which she primarily uses for community navigation. Patient reports she utilizes a knee scooter in her home, but also has a rollator, axillary crutches, quad cane, and SPC. Patient presenting today in cam boot and is WBAT. Patient demonstrates decreased ROM, decreased strength, impaired posture, impaired gait mechancis, pain and decreased functional mobility tolerance. Patient is also classified as a falls risk, as evidenced by her single limb balance and recent hx of falling. The listed deficits impair her ability to negotiate stairs, ambulate community distances, and perform ADLs.  Trialed ambulation with a FWW with right cam boot donned WBAT right LE with CGA Patient would benefit from skilled physical therapy to address listed deficits, reduce pain, and maximize functional potential.      Evaluation Complexity History HIGH Complexity :3+ comorbidities / personal factors will impact the outcome/ POC ; Examination MEDIUM Complexity : 3 Standardized tests and measures addressing body structure, function, activity limitation and / or participation in recreation  ;Presentation MEDIUM Complexity : Evolving with changing characteristics  ; Clinical Decision Making MEDIUM Complexity : FOTO score of 26-74 : FOTO score = an established functional score where 100 = no disability  Overall Complexity Rating: MEDIUM    Problem List: pain affecting function, decrease ROM, decrease strength, edema affecting function, impaired gait/ balance, decrease ADL/ functional abilitiies, decrease activity tolerance, decrease flexibility/ joint mobility and decrease transfer abilities   Treatment Plan may include any combination of the following: Therapeutic exercise, Therapeutic activities, Neuromuscular re-education, Physical agent/modality, Gait/balance training, Manual therapy, Patient education, Self Care training, Functional mobility training, Home safety training and Stair training  Vasopnuematic compression justification:  Per bilateral girth measures taken and listed above the edema is considered significant and having an impact on the patient's strength, balance, gait, transfers, self care and ADLs  Patient / Family readiness to learn indicated by: asking questions, trying to perform skills and interest  Persons(s) to be included in education: patient (P)  Barriers to Learning/Limitations: None  Measures taken if barriers to learning: N/A  Patient Goal (s): To get back to walking again  Patient Self Reported Health Status: good  Rehabilitation Potential: good    Short Term Goals: To be accomplished in 4 weeks:  1. Patient will be independent and compliant with HEP to progress toward goals and restore functional mobility.    [place] Status: issued at eval     2. Patient will improve FOTO score by 6 points to improve functional tolerance for exercise. Eval Status: FOTO 49  FOTO score = an established functional score where 100 = no disability     3. Patient will improve pain in right foot to 5/10  to improve activity tolerance and restore prior level of function. Eval Status: 9/10 at worst        4. Pt will have painfree right ankle and great toe AROM with at least 5 degree increase in all planes to aid in functional mechanics for ambulation/ADLs. Eval Status:                 AROM              PROM                          Left Right Left Right   Dorsiflexion 12 -10 -10     Plantarflexion 50 30 32     Inversion 15 NT per MD restrictions       Eversion 30 NT per MD restrictions       Great Toe Ext 30 25 NT     Great Toe Flex 10 10 NT                Long Term Goals: To be accomplished in 8 weeks:  1. Patient will improve FOTO score by 12 points to improve functional tolerance for ambulation and ADLs. Eval Status: FOTO 49  FOTO score = an established functional score where 100 = no disability     2. Pt will have painfree right ankle and great toe AROM with at least 15 degree improvement in all planes to aid in functional mechanics for ambulation/ADLs. Eval Status:                 AROM                          PROM                          Left Right Left Right   Dorsiflexion 12 -10 -10     Plantarflexion 50 30 32     Inversion 15 NT per MD restrictions       Eversion 30 NT per MD restrictions       Great Toe Ext 30 25 NT     Great Toe Flex 10 10 NT             3. Pt will have 5/5 LE strength to return to goals of ambulation and performing ADLs.   Eval Status:         Strength (1-5)    Left  Right   Dorsiflexion 5 4   Plantarflexion 5 4   Inversion 5 NT per MD restrictions   Eversion 5 NT per MD restrictions   Great Toe Ext 5 3   Great Toe Flex 4+ +      Lower Extremity Left  (0-5) Right (0-5)   Hip Flexion (L1,2) 4+ 4+   Knee Extension (L3,4) 4+ 4   Knee Flexion (S1,2) 4 4   Hip IR 4+ 4   Hip ER 4+ 4   Hip Abduction 4+ 4   Hip Adduction 4+ 4+           4. Patient will ambulate 500ft mod I with LRAD demonstrating symmetrical gait pattern in order to facilitate her ability to ambulate in the community. Eval Status: 15ft with FWW WBAT Right LE with right cam boot and gait belt donned with CGA     5. Patient will improve pain in right foot and ankle to 1-2/10 at worst to improve activity tolerance and restore prior level of function. Eval Status: 9/10 at worst      Frequency / Duration: Patient to be seen 2 times per week for 8 weeks. Patient/ Caregiver education and instruction: Diagnosis, prognosis, self care, activity modification, brace/ splint application and exercises   [x]  Plan of care has been reviewed with PTA    Certification Period: N/A    Satinder Johnson, PT 11/22/2021 11:32 AM    ________________________________________________________________________    I certify that the above Therapy Services are being furnished while the patient is under my care. I agree with the treatment plan and certify that this therapy is necessary.     Physician's Signature:_____________________Date:____________TIME:________                                      Abhi Orellana MD      ** Signature, Date and Time must be completed for valid certification **  Please sign and return to [facility]  [address] [place], [address]  Ph [phone]  Fx [phone]

## 2021-12-06 ENCOUNTER — HOSPITAL ENCOUNTER (OUTPATIENT)
Dept: PHYSICAL THERAPY | Age: 56
Discharge: HOME | End: 2021-12-06
Payer: MEDICAID

## 2021-12-06 PROCEDURE — 97110 THERAPEUTIC EXERCISES: CPT

## 2021-12-06 PROCEDURE — 97116 GAIT TRAINING THERAPY: CPT

## 2021-12-06 PROCEDURE — 97530 THERAPEUTIC ACTIVITIES: CPT

## 2021-12-06 PROCEDURE — 97016 VASOPNEUMATIC DEVICE THERAPY: CPT

## 2021-12-06 NOTE — PROGRESS NOTES
PT DAILY TREATMENT NOTE    Patient Name: Shyla Chin  Date:2021  : 1965  [x]  Patient  Verified  Payor: BLUE CROSS MEDICAID / Plan: [facility] / Product Type: Managed Care Medicaid /    In time:3:32pm  Out time:4: 25  Total Treatment Time (min): 53  Total Timed Codes (min): 53  1:1 Treatment Time (MC/BCBS only): 48   Visit #: 2 of 16    Treatment Dx: Right foot pain [M79.671]    SUBJECTIVE  Pain Level (0-10 scale): 4  Any medication changes, allergies to medications, adverse drug reactions, diagnosis change, or new procedure performed?: [x] No    [] Yes (see summary sheet for update)  Subjective functional status/changes:   [] No changes reported  The pt reported that she thinks that her MD said she is PWB    OBJECTIVE    Modalities Rationale:     decrease edema, decrease inflammation and decrease pain to improve patient's ability to perform daily activities with decreased pain and symptom levels   min [] Estim, type/location:                                      []  att     []  unatt     []  w/US     []  w/ice    []  w/heat    min []  Mechanical Traction: type/lbs                   []  pro   []  sup   []  int   []  cont    []  before manual    []  after manual    min []  Ultrasound, settings/location:      min []  Iontophoresis w/ dexamethasone, location:                                               []  take home patch       []  in clinic    min []  Ice     []  Heat    location/position:    10 min [x]  Vasopneumatic Device, press/temp: Right foot - LE elevated    If using vaso (only need to measure limb vaso being performed on)      pre-treatment girth : 57.5 cm      post-treatment girth : 57 cm       measured at (landmark location) :  Figure 8    min []  Other:    [] Skin assessment post-treatment (if applicable):    []  intact    []  redness- no adverse reaction                  []redness  adverse reaction:          21 min Therapeutic Exercise:  [x] See flow sheet : Rationale: increase ROM and increase strength to improve the patients ability to return to PLOF    13 min Therapeutic Activity:  [x]  See flow sheet :   Rationale: increase ROM, increase strength and improve coordination  to improve the patients ability to perform daily activities with decreased pain and symptom levels    9 min Gait Training:  Sizing for quad cane and axillary crutches with AD education with a 3 point gait for PWB/WBAT   Rationale: To improve ambulation safety and efficiency in order to improve patient's ability to safely ambulate at home for self care. With   [] TE   [x] TA   [] neuro   [] other: Patient Education: [x] Review HEP    [] Progressed/Changed HEP based on:   [] positioning   [] body mechanics   [] transfers   [] heat/ice application    [] other:      Other Objective/Functional Measures: updated HEP; pt came to therapy on a knee scooter      Pain Level (0-10 scale) post treatment: 4    ASSESSMENT/Changes in Function: The pt reported to therapy with a pleasant attitude and ready to participate in therapeutic exercises. Patient tolerated treatment session well today. Patient had no complaints with addition of toe yoga to exercise program to accomplish toe functional strengthening. Patient continues to make slow progress toward goals and would benefit from continued skilled PT intervention to address remaining deficits outlined in goals below. Patient will continue to benefit from skilled PT services to modify and progress therapeutic interventions, address functional mobility deficits, address ROM deficits, address strength deficits, analyze and address soft tissue restrictions, analyze and cue movement patterns, analyze and modify body mechanics/ergonomics, assess and modify postural abnormalities, address imbalance/dizziness and instruct in home and community integration to attain remaining goals.      [x]  See Plan of Care  []  See progress note/recertification  [] See Discharge Summary         Progress towards goals / Updated goals:  Short Term Goals: To be accomplished in 4 weeks:  1. Patient will be independent and compliant with HEP to progress toward goals and restore functional mobility. Eval Status: issued at [facility]  Current 12/6/21: updated HEP and pt reported understanding      2. Patient will improve FOTO score by 6 points to improve functional tolerance for exercise. Eval Status: FOTO 49  FOTO score = an established functional score where 100 = no disability     3. Patient will improve pain in right foot to 5/10  to improve activity tolerance and restore prior level of function. Eval Status: 9/10 at worst       4. Pt will have painfree right ankle and great toe AROM with at least 5 degree increase in all planes to aid in functional mechanics for ambulation/ADLs. Eval Status:                 AROM              PROM                          Left Right Left Right   Dorsiflexion 12 -10 -10     Plantarflexion 50 30 32     Inversion 15 NT per MD restrictions       Eversion 30 NT per MD restrictions       Great Toe Ext 30 25 NT     Great Toe Flex 10 10 NT                Long Term Goals: To be accomplished in 8 weeks:  1. Patient will improve FOTO score by 12 points to improve functional tolerance for ambulation and ADLs. Eval Status: FOTO 49  FOTO score = an established functional score where 100 = no disability     2. Pt will have painfree right ankle and great toe AROM with at least 15 degree improvement in all planes to aid in functional mechanics for ambulation/ADLs. Eval Status:                 AROM                          PROM                          Left Right Left Right   Dorsiflexion 12 -10 -10     Plantarflexion 50 30 32     Inversion 15 NT per MD restrictions       Eversion 30 NT per MD restrictions       Great Toe Ext 30 25 NT     Great Toe Flex 10 10 NT             3.  Pt will have 5/5 LE strength to return to goals of ambulation and performing ADLs.  Eval Status:         Strength (1-5)    Left  Right   Dorsiflexion 5 4   Plantarflexion 5 4   Inversion 5 NT per MD restrictions   Eversion 5 NT per MD restrictions   Great Toe Ext 5 3   Great Toe Flex 4+ +      Lower Extremity Left  (0-5) Right (0-5)   Hip Flexion (L1,2) 4+ 4+   Knee Extension (L3,4) 4+ 4   Knee Flexion (S1,2) 4 4   Hip IR 4+ 4   Hip ER 4+ 4   Hip Abduction 4+ 4   Hip Adduction 4+ 4+           4.  Patient will ambulate 500ft mod I with LRAD demonstrating symmetrical gait pattern in order to facilitate her ability to ambulate in the community.   Eval Status: 15ft with FWW WBAT Right LE with right cam boot and gait belt donned with CGA     5. Patient will improve pain in right foot and ankle to 1-2/10 at worst to improve activity tolerance and restore prior level of function.              Eval Status: 9/10 at worst  Current 12/6/21: see STG above        PLAN  []  Upgrade activities as tolerated     [x]  Continue plan of care  []  Update interventions per flow sheet       []  Discharge due to:_  []  Other:_      Mohini Doe PT 12/6/2021  1:58 PM    Future Appointments   Date Time Provider Noah Gregory   12/6/2021  3:30 PM Julianna Ponce [facility]   12/8/2021  2:45 PM [facility]   12/13/2021  3:30 PM [facility]   12/15/2021  2:00 PM Cecily Medina PT [facility]   12/20/2021  2:45 PM [facility]   12/22/2021  1:15 PM Cecily Medina PT [facility]   12/27/2021  2:00 PM Cecily Medina PT [facility]   12/29/2021  2:00 PM Cecily Medina PT [facility]

## 2021-12-08 ENCOUNTER — HOSPITAL ENCOUNTER (OUTPATIENT)
Dept: PHYSICAL THERAPY | Age: 56
Discharge: HOME | End: 2021-12-08
Payer: MEDICAID

## 2021-12-08 PROCEDURE — 97016 VASOPNEUMATIC DEVICE THERAPY: CPT

## 2021-12-08 PROCEDURE — 97112 NEUROMUSCULAR REEDUCATION: CPT

## 2021-12-08 PROCEDURE — 97530 THERAPEUTIC ACTIVITIES: CPT

## 2021-12-08 PROCEDURE — 97110 THERAPEUTIC EXERCISES: CPT

## 2021-12-08 NOTE — PROGRESS NOTES
PT DAILY TREATMENT NOTE    Patient Name: Tierney Mercado  Date:2021  : 1965  [x]  Patient  Verified  Payor: BLUE CROSS MEDICAID / Plan: [facility] CROSS Emre Haro / Product Type: Managed Care Medicaid /    In time:2:43  Out time:3:43  Total Treatment Time (min): 60  Total Timed Codes (min): 60  1:1 Treatment Time (MC/BCBS only): 50   Visit #: 3 of 16    Treatment Dx: Right foot pain [M79.671]    SUBJECTIVE  Pain Level (0-10 scale): 6/10 Right Hip, 5/10 Right Foot  Any medication changes, allergies to medications, adverse drug reactions, diagnosis change, or new procedure performed?: [x] No    [] Yes (see summary sheet for update)  Subjective functional status/changes:   [] No changes reported  \"I got some pain today in my hip and my foot. I think it's arthritis because of the weather. \"    OBJECTIVE    Modalities Rationale:     decrease edema, decrease inflammation and decrease pain to improve patient's ability to return to prior level of physical activity. 10 min [x]  Vasopneumatic Device, press/temp: Med/High   If using vaso (only need to measure limb vaso being performed on)      pre-treatment girth : 55 cm      post-treatment girth :       measured at (landmark location) :  Figure 8 of ankle   [x] Skin assessment post-treatment (if applicable):    [x]  intact    []  redness- no adverse reaction                  []redness  adverse reaction:          28 min Therapeutic Exercise:  [x] See flow sheet :   Rationale: increase ROM, increase strength and improve coordination to improve the patients ability to return to prior level of physical activity. 12 min Therapeutic Activity:  [x]  See flow sheet :   Rationale: increase ROM, increase strength and improve coordination  to improve the patients ability to return to prior level of physical activity.        10 min Neuromuscular Re-education:  [x]  See flow sheet :   Rationale: increase ROM, increase strength and improve coordination  to improve the patients ability to return to prior level of physical activity. With   [] TE   [] TA   [] neuro   [] other: Patient Education: [x] Review HEP    [] Progressed/Changed HEP based on:   [] positioning   [] body mechanics   [] transfers   [] heat/ice application    [] other:      Other Objective/Functional Measures:      Pain Level (0-10 scale) post treatment:  5/10     ASSESSMENT/Changes in Function: Pt tolerated session well. Pt was able to perfom good weightbearing through foot with boot. Pt reported no increased pain throughout tx. Pt demonstrated decreased ROM generally in AROM. Pt received VASO post for swelling and pain reduction. Patient will continue to benefit from skilled PT services to modify and progress therapeutic interventions, address functional mobility deficits, address ROM deficits, address strength deficits, analyze and address soft tissue restrictions, analyze and cue movement patterns, analyze and modify body mechanics/ergonomics and assess and modify postural abnormalities to attain remaining goals. [x]  See Plan of Care  []  See progress note/recertification  []  See Discharge Summary         Progress towards goals / Updated goals:  Short Term Goals: To be accomplished in 4 weeks:  1. Patient will be independent and compliant with HEP to progress toward goals and restore functional mobility. Eval Status: issued at eval   Current 12/6/21: updated HEP and pt reported understanding      2. Patient will improve FOTO score by 6 points to improve functional tolerance for exercise. Eval Status: FOTO 49  FOTO score = an established functional score where 100 = no disability     3. Patient will improve pain in right foot to 5/10  to improve activity tolerance and restore prior level of function. Eval Status: 9/10 at worst  Current: 5/10 at worst in the past week 12/8/21      4.  Pt will have painfree right ankle and great toe AROM with at least 5 degree increase in all planes to aid in functional mechanics for ambulation/ADLs. Eval Status:                 AROM              PROM                          Left Right Left Right   Dorsiflexion 12 -10 -10     Plantarflexion 50 30 32     Inversion 15 NT per MD restrictions       Eversion 30 NT per MD restrictions       Great Toe Ext 30 25 NT     Great Toe Flex 10 10 NT                Long Term Goals: To be accomplished in 8 weeks:  1. Patient will improve FOTO score by 12 points to improve functional tolerance for ambulation and ADLs. Eval Status: FOTO 49  FOTO score = an established functional score where 100 = no disability     2. Pt will have painfree right ankle and great toe AROM with at least 15 degree improvement in all planes to aid in functional mechanics for ambulation/ADLs. Eval Status:                 AROM                          PROM                          Left Right Left Right   Dorsiflexion 12 -10 -10     Plantarflexion 50 30 32     Inversion 15 NT per MD restrictions       Eversion 30 NT per MD restrictions       Great Toe Ext 30 25 NT     Great Toe Flex 10 10 NT             3. Pt will have 5/5 LE strength to return to goals of ambulation and performing ADLs. Eval Status:         Strength (1-5)    Left  Right   Dorsiflexion 5 4   Plantarflexion 5 4   Inversion 5 NT per MD restrictions   Eversion 5 NT per MD restrictions   Great Toe Ext 5 3   Great Toe Flex 4+ +      Lower Extremity Left  (0-5) Right (0-5)   Hip Flexion (L1,2) 4+ 4+   Knee Extension (L3,4) 4+ 4   Knee Flexion (S1,2) 4 4   Hip IR 4+ 4   Hip ER 4+ 4   Hip Abduction 4+ 4   Hip Adduction 4+ 4+           4.  Patient will ambulate 500ft mod I with LRAD demonstrating symmetrical gait pattern in order to facilitate her ability to ambulate in the community.   Eval Status: 15ft with FWW WBAT Right LE with right cam boot and gait belt donned with CGA     5. Patient will improve pain in right foot and ankle to 1-2/10 at worst to improve activity tolerance and restore prior level of function.              Eval Status: 9/10 at worst  Current 12/6/21: see STG above         PLAN  [x]  Upgrade activities as tolerated     [x]  Continue plan of care  []  Update interventions per flow sheet       []  Discharge due to:_  []  Other:_      Phil Araiza, PTA 12/8/2021  2:50 PM    Future Appointments   Date Time Provider Noah Gregory   12/13/2021  3:30 PM [facility]   12/15/2021  2:00 PM Iron Mackey, [facility]   12/20/2021  2:45 PM [facility]   12/22/2021  1:15 PM Iron Mackey, PT [facility]   12/27/2021  2:00 PM Iron Mackey, PT [facility]   12/29/2021  2:00 PM Iron Mackey, PT [facility]

## 2021-12-13 ENCOUNTER — APPOINTMENT (OUTPATIENT)
Dept: PHYSICAL THERAPY | Age: 56
End: 2021-12-13
Payer: MEDICAID

## 2021-12-15 ENCOUNTER — APPOINTMENT (OUTPATIENT)
Dept: PHYSICAL THERAPY | Age: 56
End: 2021-12-15
Payer: MEDICAID

## 2021-12-20 ENCOUNTER — HOSPITAL ENCOUNTER (OUTPATIENT)
Dept: PHYSICAL THERAPY | Age: 56
Discharge: HOME | End: 2021-12-20
Payer: MEDICAID

## 2021-12-20 PROCEDURE — 97016 VASOPNEUMATIC DEVICE THERAPY: CPT

## 2021-12-20 PROCEDURE — 97535 SELF CARE MNGMENT TRAINING: CPT

## 2021-12-20 PROCEDURE — 97530 THERAPEUTIC ACTIVITIES: CPT

## 2021-12-20 PROCEDURE — 97110 THERAPEUTIC EXERCISES: CPT

## 2021-12-20 NOTE — PROGRESS NOTES
PT DAILY TREATMENT NOTE    Patient Name: Maximo Garrett  Date:2021  : 1965  [x]  Patient  Verified  Payor: BLUE CROSS MEDICAID / Plan: [facility] [employer] / Product Type: Managed Care Medicaid /    In time:249  Out time: 342  Total Treatment Time (min): 53  Total Timed Codes (min): 43  1:1 Treatment Time (MC/BCBS only): 43  Visit #: 4 of 16    Treatment Dx: Right foot pain [M79.671]    SUBJECTIVE  Pain Level (0-10 scale): 6/10  Any medication changes, allergies to medications, adverse drug reactions, diagnosis change, or new procedure performed?: [x] No    [] Yes (see summary sheet for update)  Subjective functional status/changes:   [] No changes reported  Patient reports that she will have an injection in her right hip tomorrow. OBJECTIVE    Modalities Rationale:     decrease edema, decrease inflammation and decrease pain to improve patient's ability to increase her ability to    min [] Estim, type/location:                                      []  att     []  unatt     []  w/US     []  w/ice    []  w/heat    min []  Mechanical Traction: type/lbs                   []  pro   []  sup   []  int   []  cont    []  before manual    []  after manual    min []  Ultrasound, settings/location:      min []  Iontophoresis w/ dexamethasone, location:                                               []  take home patch       []  in clinic    min []  Ice     []  Heat    location/position:    10 min [x]  Vasopneumatic Device, press/temp: Med: 34 degrees   If using vaso (only need to measure limb vaso being performed on)      pre-treatment girth : 55 cm      post-treatment girth : 55 cm      measured at (landmark location) : Ankle figure 8    min []  Other:    [] Skin assessment post-treatment (if applicable):    []  intact    []  redness- no adverse reaction                  []redness  adverse reaction:           8 min Therapeutic Activity:  [x]?   See flow sheet :   Rationale: increase ROM, increase strength and improve coordination  to improve the patients ability to ambulate        22 min Therapeutic Exercise:  [x] See flow sheet :   Rationale: increase ROM, increase strength and improve coordination to improve the patients ability to return to PLOF. 12 min Manual Therapy:  Scar tissue massage   Rationale: decrease pain, increase ROM and increase tissue extensibility to improve the patients ability to increase ambulate with increase  The manual therapy interventions were performed at a separate and distinct time from the therapeutic activities interventions. With   [] TE   [] TA   [] neuro   [] other: Patient Education: [x] Review HEP    [] Progressed/Changed HEP based on:   [] positioning   [] body mechanics   [] transfers   [] heat/ice application    [] other:         Pain Level (0-10 scale) post treatment: 6/10    ASSESSMENT/Changes in Function: Patient tolerated treatment session well today. Patient had no complaints with addition of scar tissue mobilization to decrease adhesion of the scar. Patient is now using a small base quad cane. She was instructed to use it in left  hand to offload the right ankle and to approximate a normal gait pattern. Patient continues to make steady progress toward goals and would benefit from continued skilled PT intervention to address remaining deficits outlined in goals below. Patient will continue to benefit from skilled PT services to modify and progress therapeutic interventions, address functional mobility deficits, address ROM deficits, address strength deficits, analyze and address soft tissue restrictions, analyze and cue movement patterns, analyze and modify body mechanics/ergonomics, assess and modify postural abnormalities and instruct in home and community integration to attain remaining goals.      [x]  See Plan of Care  []  See progress note/recertification  []  See Discharge Summary         Progress towards goals / Updated goals:  Short Term Goals: To be accomplished in 4 weeks:  1. Patient will be independent and compliant with HEP to progress toward goals and restore functional mobility. Eval Status: issued at eval              Current 12/6/21: updated HEP and pt reported understanding                Current 12/20/21 pt reports doing the HEP daily     2. Patient will improve FOTO score by 6 points to improve functional tolerance for exercise. Eval Status: FOTO 49  FOTO score = an established functional score where 100 = no disability     3. Patient will improve pain in right foot to 5/10  to improve activity tolerance and restore prior level of function. Eval Status: 9/10 at worst  Current: 5/10 at worst in the past week 12/8/21      4. Pt will have painfree right ankle and great toe AROM with at least 5 degree increase in all planes to aid in functional mechanics for ambulation/ADLs. Eval Status:                 AROM              PROM                          Left Right Left Right   Dorsiflexion 12 -10 -10     Plantarflexion 50 30 32     Inversion 15 NT per MD restrictions       Eversion 30 NT per MD restrictions       Great Toe Ext 30 25 NT     Great Toe Flex 10 10 NT                Long Term Goals: To be accomplished in 8 weeks:  1. Patient will improve FOTO score by 12 points to improve functional tolerance for ambulation and ADLs. Eval Status: FOTO 49  FOTO score = an established functional score where 100 = no disability     2. Pt will have painfree right ankle and great toe AROM with at least 15 degree improvement in all planes to aid in functional mechanics for ambulation/ADLs. Eval Status:                 AROM                          PROM                          Left Right Left Right   Dorsiflexion 12 -10 -10     Plantarflexion 50 30 32     Inversion 15 NT per MD restrictions       Eversion 30 NT per MD restrictions       Great Toe Ext 30 25 NT     Great Toe Flex 10 10 NT             3.  Pt will have 5/5 LE strength to return to goals of ambulation and performing ADLs. Eval Status:         Strength (1-5)    Left  Right   Dorsiflexion 5 4   Plantarflexion 5 4   Inversion 5 NT per MD restrictions   Eversion 5 NT per MD restrictions   Great Toe Ext 5 3   Great Toe Flex 4+ +      Lower Extremity Left  (0-5) Right (0-5)   Hip Flexion (L1,2) 4+ 4+   Knee Extension (L3,4) 4+ 4   Knee Flexion (S1,2) 4 4   Hip IR 4+ 4   Hip ER 4+ 4   Hip Abduction 4+ 4   Hip Adduction 4+ 4+           4.  Patient will ambulate 500ft mod I with LRAD demonstrating symmetrical gait pattern in order to facilitate her ability to ambulate in the community.   Eval Status: 15ft with FWW WBAT Right LE with right cam boot and gait belt donned with CGA     5. Patient will improve pain in right foot and ankle to 1-2/10 at worst to improve activity tolerance and restore prior level of function.              Eval Status: 9/10 at worst  Current 12/6/21: see STG above            PLAN  []  Upgrade activities as tolerated     [x]  Continue plan of care  []  Update interventions per flow sheet       []  Discharge due to:_  []  Other:_      Clara Capellan, PT 12/20/2021  2:42 PM    Future Appointments   Date Time Provider Noah Gregory   12/20/2021  2:45 PM Soraya Serrato, PT [facility]   12/22/2021  1:15 PM Deborah File, [address] [facility]   12/27/2021  2:00 PM Deborah Lynn, [address] [facility]   12/29/2021  2:00 PM Jose Antonio Perry, [address] [facility]

## 2021-12-22 ENCOUNTER — APPOINTMENT (OUTPATIENT)
Dept: PHYSICAL THERAPY | Age: 56
End: 2021-12-22
Payer: MEDICAID

## 2021-12-27 ENCOUNTER — HOSPITAL ENCOUNTER (OUTPATIENT)
Dept: PHYSICAL THERAPY | Age: 56
Discharge: HOME | End: 2021-12-27
Payer: MEDICAID

## 2021-12-27 PROCEDURE — 97530 THERAPEUTIC ACTIVITIES: CPT

## 2021-12-27 PROCEDURE — 97016 VASOPNEUMATIC DEVICE THERAPY: CPT

## 2021-12-27 PROCEDURE — 97110 THERAPEUTIC EXERCISES: CPT

## 2021-12-27 PROCEDURE — 97112 NEUROMUSCULAR REEDUCATION: CPT

## 2021-12-27 NOTE — PROGRESS NOTES
PT DAILY TREATMENT NOTE    Patient Name: Macrina Obando  Date:2021  : 1965  [x]  Patient  Verified  Payor: BLUE CROSS MEDICAID / Plan: [facility] / Product Type: Managed Care Medicaid /    In time:200  Out time:258  Total Treatment Time (min): 58  Total Timed Codes (min): 48  1:1 Treatment Time (MC/BCBS only): 48  Visit #: 5 of 16    Treatment Dx: Right foot pain [M79.671]    SUBJECTIVE  Pain Level (0-10 scale): 4/10  Any medication changes, allergies to medications, adverse drug reactions, diagnosis change, or new procedure performed?: [x] No    [] Yes (see summary sheet for update)  Subjective functional status/changes:   [] No changes reported  Pt reports of wanting to return to the gym and have been performing her exercises at home. OBJECTIVE  Modalities Rationale:     decrease edema, decrease inflammation and decrease pain to improve patient's ability to increase her ability to     10 min [x]? Vasopneumatic Device, press/temp: Med: 34 degrees   If using vaso (only need to measure limb vaso being performed on)      pre-treatment girth : 54 cm      post-treatment girth : 54 cm      measured at (landmark location) : Ankle figure 8         16 min Therapeutic Exercise:  [x] See flow sheet :   Rationale: increase ROM, increase strength, improve coordination and improve balance to improve the patients ability to restore PLOF      16 min Therapeutic Activity:  [x]  See flow sheet :   Rationale: increase ROM, increase strength, improve coordination, improve balance and increase proprioception  to improve the patients ability to complete transfers and ADLs without external assist       16 min Neuromuscular Re-education:  [x]  See flow sheet :   Rationale: increase ROM, increase strength, improve coordination, improve balance and increase proprioception  to improve ankle stabilization.             With   [x] TE   [x] TA   [x] neuro   [] other: Patient Education: [x] Review HEP    [x] Progressed/Changed HEP based on:   [x] positioning   [x] body mechanics   [] transfers   [] heat/ice application    [] other:      Other Objective/Functional Measures:     Pt left foot is present with slight swelling. Pain Level (0-10 scale) post treatment: 4/10    ASSESSMENT/Changes in Function:   Patient tolerated treatment session well today. Patient had no complaints performing exercises without her boots today to accomplish improved balance and strength. Pt performed well with SLS/Weight shift/ tandem without any pain or complaints. Patient continues to make gradual progress toward goals and would benefit from continued skilled PT intervention to address remaining deficits outlined in goals below. Patient will continue to benefit from skilled PT services to modify and progress therapeutic interventions, address functional mobility deficits, address ROM deficits, address strength deficits, analyze and address soft tissue restrictions, analyze and cue movement patterns, analyze and modify body mechanics/ergonomics and assess and modify postural abnormalities to attain remaining goals. [x]  See Plan of Care  []  See progress note/recertification  []  See Discharge Summary         Progress towards goals / Updated goals:    Short Term Goals: To be accomplished in 4 weeks:  1. Patient will be independent and compliant with HEP to progress toward goals and restore functional mobility. Eval Status: issued at eval              Current 12/6/21: updated HEP and pt reported understanding                Current 12/20/21 pt reports doing the HEP daily     2. Patient will improve FOTO score by 6 points to improve functional tolerance for exercise. Eval Status: FOTO 49  CURRENT: FOTO 63 12/27/21 Goal Met  FOTO score = an established functional score where 100 = no disability     3. Patient will improve pain in right foot to 5/10  to improve activity tolerance and restore prior level of function.   Eval Status: 9/10 at worst  Current: 5/10 at worst in the past week 12/8/21      4. Pt will have painfree right ankle and great toe AROM with at least 5 degree increase in all planes to aid in functional mechanics for ambulation/ADLs. Eval Status:                 AROM              PROM                          Left Right Left Right   Dorsiflexion 12 -10 -10     Plantarflexion 50 30 32     Inversion 15 NT per MD restrictions       Eversion 30 NT per MD restrictions       Great Toe Ext 30 25 NT     Great Toe Flex 10 10 NT                Long Term Goals: To be accomplished in 8 weeks:  1. Patient will improve FOTO score by 12 points to improve functional tolerance for ambulation and ADLs. Eval Status: FOTO 49    CURRENT: FOTO 63 12/27/21 Goal Met  FOTO score = an established functional score where 100 = no disability     2. Pt will have painfree right ankle and great toe AROM with at least 15 degree improvement in all planes to aid in functional mechanics for ambulation/ADLs. Eval Status:                 AROM                          PROM                          Left Right Left Right   Dorsiflexion 12 -10 -10     Plantarflexion 50 30 32     Inversion 15 NT per MD restrictions       Eversion 30 NT per MD restrictions       Great Toe Ext 30 25 NT     Great Toe Flex 10 10 NT             3. Pt will have 5/5 LE strength to return to goals of ambulation and performing ADLs. Eval Status:         Strength (1-5)    Left  Right   Dorsiflexion 5 4   Plantarflexion 5 4   Inversion 5 NT per MD restrictions   Eversion 5 NT per MD restrictions   Great Toe Ext 5 3   Great Toe Flex 4+ +      Lower Extremity Left  (0-5) Right (0-5)   Hip Flexion (L1,2) 4+ 4+   Knee Extension (L3,4) 4+ 4   Knee Flexion (S1,2) 4 4   Hip IR 4+ 4   Hip ER 4+ 4   Hip Abduction 4+ 4   Hip Adduction 4+ 4+           4.  Patient will ambulate 500ft mod I with LRAD demonstrating symmetrical gait pattern in order to facilitate her ability to ambulate in the community.   Eval Status: 15ft with FWW WBAT Right LE with right cam boot and gait belt donned with CGA     5. Patient will improve pain in right foot and ankle to 1-2/10 at worst to improve activity tolerance and restore prior level of function.              Eval Status: 9/10 at worst  Current 12/6/21: see STG above       PLAN  [x]  Upgrade activities as tolerated     [x]  Continue plan of care  []  Update interventions per flow sheet       []  Discharge due to:_  []  Other:_      Allie Carranza, SPT 12/27/2021  1:37 PM    Signed By: Hemal Hudson PT     December 27, 2021          Future Appointments   Date Time Provider Noah Gregory   12/27/2021  2:00 PM Devika Romo, PT [facility]   12/29/2021  2:00 PM Jaja Aden, [address] [place] [address]

## 2021-12-27 NOTE — PROGRESS NOTES
[facility]  [address] 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 Patient will be independent and compliant with HEP to progress toward goals and restore functional mobility. Eval Status: issued at eval              Current 21: updated HEP and pt reported understanding                Current 21 pt reports doing the HEP daily     2. Patient will improve FOTO score by 6 points to improve functional tolerance for exercise. Eval Status: FOTO 49  CURRENT: FOTO 63 21 Goal Met  FOTO score = an established functional score where 100 = no disability     3. Patient will improve pain in right foot to 5/10  to improve activity tolerance and restore prior level of function. Eval Status: 9/10 at worst  Current: 5/10 at worst in the past week 21      4. Pt will have painfree right ankle and great toe AROM with at least 5 degree increase in all planes to aid in functional mechanics for ambulation/ADLs.   Eval Status:                 AROM              PROM                          Left Right Left Right   Dorsiflexion 12 -10 -10     Plantarflexion 50 30 32     Inversion 15 NT per MD restrictions       Eversion 30 NT per MD restrictions       Great Toe Ext 30 25 NT     Great Toe Flex 10 10 NT                Long Term Goals: To be accomplished in 8 weeks:  1. Patient will improve FOTO score by 12 points to improve functional tolerance for ambulation and ADLs. Eval Status: FOTO 49                          CURRENT: FOTO 63 12/27/21 Goal Met  FOTO score = an established functional score where 100 = no disability     2. Pt will have painfree right ankle and great toe AROM with at least 15 degree improvement in all planes to aid in functional mechanics for ambulation/ADLs. Eval Status:                 AROM                          PROM                          Left Right Left Right   Dorsiflexion 12 -10 -10     Plantarflexion 50 30 32     Inversion 15 NT per MD restrictions       Eversion 30 NT per MD restrictions       Great Toe Ext 30 25 NT     Great Toe Flex 10 10 NT             3. Pt will have 5/5 LE strength to return to goals of ambulation and performing ADLs. Eval Status:         Strength (1-5)    Left  Right   Dorsiflexion 5 4   Plantarflexion 5 4   Inversion 5 NT per MD restrictions   Eversion 5 NT per MD restrictions   Great Toe Ext 5 3   Great Toe Flex 4+ +      Lower Extremity Left  (0-5) Right (0-5)   Hip Flexion (L1,2) 4+ 4+   Knee Extension (L3,4) 4+ 4   Knee Flexion (S1,2) 4 4   Hip IR 4+ 4   Hip ER 4+ 4   Hip Abduction 4+ 4   Hip Adduction 4+ 4+           4.  Patient will ambulate 500ft mod I with LRAD demonstrating symmetrical gait pattern in order to facilitate her ability to ambulate in the community.   Eval Status: 15ft with FWW WBAT Right LE with right cam boot and gait belt donned with CGA     5. Patient will improve pain in right foot and ankle to 1-2/10 at worst to improve activity tolerance and restore prior level of function.              Eval Status: 9/10 at worst  Current 12/6/21: see STG above        Summary of Care/ Key Functional Changes: Patient tolerated treatment session well today. Patient had no complaints performing exercises without her boots today to accomplish improved balance and strength. Pt performed well with SLS/Weight shift/ tandem without any pain or complaints. Patient continues to make gradual progress toward goals and would benefit from continued skilled PT intervention to address remaining deficits outlined in goals below.     Patient will continue to benefit from skilled PT services to modify and progress therapeutic interventions, address functional mobility deficits, address ROM deficits, address strength deficits, analyze and address soft tissue restrictions, analyze and cue movement patterns, analyze and modify body mechanics/ergonomics and assess and modify postural abnormalities to attain remaining goals.      ASSESSMENT/RECOMMENDATIONS:  [x]Continue therapy per initial plan/protocol at a frequency of  2 x per week for 6 weeks  []Continue therapy with the following recommended changes:_____________________ _____________________________ ________________________________________  []Discontinue therapy progressing towards or have reached established goals  []Discontinue therapy due to lack of appreciable progress towards goals  []Discontinue therapy due to lack of attendance or compliance  []Await Physician's recommendations/decisions regarding therapy  []Other:________________________________________________________________    Thank you for this referral.   Iven Peabody, PT 12/27/2021 4:28 PM

## 2021-12-29 ENCOUNTER — HOSPITAL ENCOUNTER (OUTPATIENT)
Dept: PHYSICAL THERAPY | Age: 56
Discharge: HOME | End: 2021-12-29
Payer: MEDICAID

## 2021-12-29 PROCEDURE — 97112 NEUROMUSCULAR REEDUCATION: CPT

## 2021-12-29 PROCEDURE — 97110 THERAPEUTIC EXERCISES: CPT

## 2021-12-29 NOTE — PROGRESS NOTES
PT DAILY TREATMENT NOTE    Patient Name: Maggie Dowell  Date: 2021  : 1965  [x]  Patient  Verified  Payor: BLUE CROSS MEDICAID / Plan: [facility] [employer] / Product Type: Managed Care Medicaid /    In Time: 2:08  Out Time: 2:48  Total Treatment Time (min): 40  Total Timed Codes (min): 40  1:1 Treatment Time (MC only): 40   Visit #:     Treatment Dx: Right foot pain [M79.671]    SUBJECTIVE  Pre-Treatment Pain Level (0-10 scale): 4    Any medication changes, allergies to medications, adverse drug reactions, diagnosis change, or new procedure performed?: [x] No    [] Yes (see summary sheet for update)    Subjective Functional Status/Changes:  [] No changes reported  \"I just have my usual 4 out of 10 pain today. It's nothing too bad. \"  Patient states she was in a rush to get out the door today so she didn't bring in her right shoe today. She presents to clinic today in a CAM boot using [employer]. OBJECTIVE    32 min Therapeutic Exercise:  [x] See flow sheet   Rationale: increase ROM, increase strength, and decrease pain to assist in improving patient's ability to perform functional activities and ADLs    8 min Neuromuscular Re-Education:  [x] See flow sheet   Rationale: improve coordination, improve weight shifts in preparation for gait, improve balance/proprioception, improve posture, and improve core stabilization to assist in improving patient's ability to perform functional activities and ADLs as well as to improve core stabilization during static/dynamic movements    Other Objective/Functional Measures: N/A     Pain Level (0-10 scale) Post Treatment: 4    ASSESSMENT/Changes in Function:  Patient responded well to today's treatment without any adverse reactions. Patient continues to gradually improve with AROM into dorsiflexion/plantarflexion.   She did well with SLS and tandem stances while in the boot today (since she didn't bring her shoes), with no gross LOB but she did require occasional hand taps on the parallel bars. Added seated rockerboard into DF/PF to improve AROM and proprioception, patient denied having any increased pain during this. Vaso offered post-treatment, but patient politely declined stating cold tends to increase her \"arthritic pain\". Patient will continue to benefit from skilled PT services to further modify and progress therapeutic interventions, address functional mobility deficits, address ROM deficits, address strength deficits, analyze and address soft tissue restrictions, analyze and cue movement patterns, analyze and modify body mechanics/ergonomics, assess and modify postural abnormalities, and instruct in home and community integration to attain remaining goals. [x]  See Plan of Care  []  See Progress Note/Recertification  []  See Discharge Summary         Progress Towards Goals/Updated Goals:  Short Term Goals: To be accomplished in 4 weeks:  1. Patient will be independent and compliant with HEP to progress toward goals and restore functional mobility. Eval Status: issued at eval               Current: Patient reports daily compliance with her HEP and denies having any questions about it.   12/29/21, met     2. Patient will improve FOTO score by 6 points to improve functional tolerance for exercise. Eval Status: FOTO 49  CURRENT: FOTO 63 12/27/21 Goal Met  FOTO score = an established functional score where 100 = no disability     3. Patient will improve pain in right foot to 5/10  to improve activity tolerance and restore prior level of function. Eval Status: 9/10 at worst  Current: 4/10 pain at worst over the last week    12/29/21, met     4. Pt will have painfree right ankle and great toe AROM with at least 5 degree increase in all planes to aid in functional mechanics for ambulation/ADLs.   Eval Status:                 AROM              PROM                          Left Right Left Right   Dorsiflexion 12 -10 -10     Plantarflexion 50 30 32     Inversion 15 NT per MD restrictions       Eversion 30 NT per MD restrictions       Great Toe Ext 30 25 NT     Great Toe Flex 10 10 NT      Current: same as above        Long Term Goals: To be accomplished in 8 weeks:  1. Patient will improve FOTO score by 12 points to improve functional tolerance for ambulation and ADLs. Eval Status: Lubertha Oppenheim                          AXUQHCP: LVRN 78 12/27/21 Goal Met  FOTO score = an established functional score where 100 = no disability     2. Pt will have painfree right ankle and great toe AROM with at least 15 degree improvement in all planes to aid in functional mechanics for ambulation/ADLs. Eval Status:                 AROM                          PROM                          Left Right Left Right   Dorsiflexion 12 -10 -10     Plantarflexion 50 30 32     Inversion 15 NT per MD restrictions       Eversion 30 NT per MD restrictions       Great Toe Ext 30 25 NT     Great Toe Flex 10 10 NT             3. Pt will have 5/5 LE strength to return to goals of ambulation and performing ADLs. Eval Status:         Strength (1-5)    Left  Right   Dorsiflexion 5 4   Plantarflexion 5 4   Inversion 5 NT per MD restrictions   Eversion 5 NT per MD restrictions   Great Toe Ext 5 3   Great Toe Flex 4+ +      Lower Extremity Left  (0-5) Right (0-5)   Hip Flexion (L1,2) 4+ 4+   Knee Extension (L3,4) 4+ 4   Knee Flexion (S1,2) 4 4   Hip IR 4+ 4   Hip ER 4+ 4   Hip Abduction 4+ 4   Hip Adduction 4+ 4+           4.  Patient will ambulate 500ft mod I with LRAD demonstrating symmetrical gait pattern in order to facilitate her ability to ambulate in the community.   Eval Status: 15ft with FWW WBAT Right LE with right cam boot and gait belt donned with CGA     5. Patient will improve pain in right foot and ankle to 1-2/10 at worst to improve activity tolerance and restore prior level of function.              Eval Status: 9/10 at worst    Current: 4/10 pain at worst over the last week; patient still limited in all functional activities at this time    12/29/21, in progress    PLAN  []  Upgrade Activities as Tolerated     [x]  Continue Plan of Care  []  Update Interventions per Flow Sheet       []  Discharge Due To:_  []  Other:_      Jason Freedman.  Ludy, PT, DPT, ATR  12/29/2021 8:54 AM    Future Appointments   Date Time Provider Noah Gregory   12/29/2021  2:00 PM Jason Boston, PT [facility] THE [facility]

## 2022-01-04 ENCOUNTER — HOSPITAL ENCOUNTER (OUTPATIENT)
Dept: PHYSICAL THERAPY | Age: 57
Discharge: HOME | End: 2022-01-04
Payer: MEDICAID

## 2022-01-04 PROCEDURE — 97110 THERAPEUTIC EXERCISES: CPT

## 2022-01-04 NOTE — PROGRESS NOTES
PT DAILY TREATMENT NOTE    Patient Name: Joycelyn Smith  Date:2022  : 1965  [x]  Patient  Verified  Payor: BLUE CROSS MEDICAID / Plan: [facility] Jose Woods / Product Type: Managed Care Medicaid /     In time:2:57  Out time:3:40  Total Treatment Time (min): 43  Total Timed Codes (min): 43  1:1 Treatment Time (MC/BCBS only): 10   Visit #: 7 of 16    Treatment Dx: Right foot pain [M79.671]    SUBJECTIVE  Pain Level (0-10 scale): 3-4/10  Any medication changes, allergies to medications, adverse drug reactions, diagnosis change, or new procedure performed?: [x] No    [] Yes (see summary sheet for update)  Subjective functional status/changes:   [] No changes reported  \"I haven't been in the boot for a few days so it's a little sore. \"    OBJECTIVE      21 min Therapeutic Exercise:  [x] See flow sheet :   Rationale: increase ROM, increase strength and improve coordination to improve the patients ability to return to prior level of physical activity. 12 min Therapeutic Activity:  [x]  See flow sheet :   Rationale: increase ROM, increase strength and improve coordination  to improve the patients ability to return to prior level of physical activity. 10 min Neuromuscular Re-education:  [x]  See flow sheet :   Rationale: increase ROM, increase strength and improve coordination  to improve the patients ability to return to prior level of physical activity. With   [] TE   [] TA   [] neuro   [] other: Patient Education: [x] Review HEP    [] Progressed/Changed HEP based on:   [] positioning   [] body mechanics   [] transfers   [] heat/ice application    [] other:      Other Objective/Functional Measures:      Pain Level (0-10 scale) post treatment: 3-4/10    ASSESSMENT/Changes in Function: Pt tolerated session well with no increased pain. Pt reported increased fatigue and soreness but, not above tolerance.  Pt continues to demonstrated difficulty with general ROM but, slowly improving. Pt denied modalities post tx. Patient will continue to benefit from skilled PT services to modify and progress therapeutic interventions, address functional mobility deficits, address ROM deficits, address strength deficits, analyze and address soft tissue restrictions, analyze and cue movement patterns, analyze and modify body mechanics/ergonomics and assess and modify postural abnormalities to attain remaining goals. [x]  See Plan of Care  []  See progress note/recertification  []  See Discharge Summary         Progress towards goals / Updated goals:  Short Term Goals: To be accomplished in 4 weeks:  1. Patient will be independent and compliant with HEP to progress toward goals and restore functional mobility. Eval Status: issued at eval               Current: Patient reports daily compliance with her HEP and denies having any questions about it.   12/29/21, met     2. Patient will improve FOTO score by 6 points to improve functional tolerance for exercise. Eval Status: FOTO 49  CURRENT: FOTO 63 12/27/21 Goal Met  FOTO score = an established functional score where 100 = no disability     3. Patient will improve pain in right foot to 5/10  to improve activity tolerance and restore prior level of function. Eval Status: 9/10 at worst  Current: 4/10 pain at worst over the last week    12/29/21, met     4. Pt will have painfree right ankle and great toe AROM with at least 5 degree increase in all planes to aid in functional mechanics for ambulation/ADLs. Eval Status:                 AROM              PROM                          Left Right Left Right   Dorsiflexion 12 -10 -10     Plantarflexion 50 30 32     Inversion 15 NT per MD restrictions       Eversion 30 NT per MD restrictions       Great Toe Ext 30 25 NT     Great Toe Flex 10 10 NT      Current: same as above        Long Term Goals: To be accomplished in 8 weeks:  1.  Patient will improve FOTO score by 12 points to improve functional tolerance for ambulation and ADLs. Eval Status: Cristobal Arzola                          VJLZMCV: IGCJ 79 12/27/21 Goal Met  FOTO score = an established functional score where 100 = no disability     2. Pt will have painfree right ankle and great toe AROM with at least 15 degree improvement in all planes to aid in functional mechanics for ambulation/ADLs. Eval Status:                 AROM                          PROM                          Left Right Left Right   Dorsiflexion 12 -10 -10     Plantarflexion 50 30 32     Inversion 15 NT per MD restrictions       Eversion 30 NT per MD restrictions       Great Toe Ext 30 25 NT     Great Toe Flex 10 10 NT             3. Pt will have 5/5 LE strength to return to goals of ambulation and performing ADLs. Eval Status:         Strength (1-5)    Left  Right   Dorsiflexion 5 4   Plantarflexion 5 4   Inversion 5 NT per MD restrictions   Eversion 5 NT per MD restrictions   Great Toe Ext 5 3   Great Toe Flex 4+ +      Lower Extremity Left  (0-5) Right (0-5)   Hip Flexion (L1,2) 4+ 4+   Knee Extension (L3,4) 4+ 4   Knee Flexion (S1,2) 4 4   Hip IR 4+ 4   Hip ER 4+ 4   Hip Abduction 4+ 4   Hip Adduction 4+ 4+           4.  Patient will ambulate 500ft mod I with LRAD demonstrating symmetrical gait pattern in order to facilitate her ability to ambulate in the community.   Eval Status: 15ft with FWW WBAT Right LE with right cam boot and gait belt donned with CGA   Current: Pt currently transitioning out of cam boot with SPC during ambulation 1/4/21    5. Patient will improve pain in right foot and ankle to 1-2/10 at worst to improve activity tolerance and restore prior level of function.              Eval Status: 9/10 at worst                          Current: 4/10 pain at worst over the last week; patient still limited in all functional activities at this time    12/29/21, in progress        PLAN  [x]  Upgrade activities as tolerated     [x]  Continue plan of care  []  Update interventions per flow sheet       []  Discharge due to:_  []  Other:_      Bertha Urban, PTA 1/4/2022  3:06 PM    Future Appointments   Date Time Provider Noah Gregory   1/7/2022  2:00 PM [facility]   1/11/2022  1:15 PM [facility]   1/14/2022  2:45 PM Denis Moody, [facility]   1/17/2022  2:45 PM Huyen Amin, PT [facility]   1/21/2022  2:00 PM Denis Moody [facility]   1/24/2022  2:45 PM Huyen Amin, [facility]   1/28/2022  2:00 PM Denis Moody [facility]

## 2022-01-07 ENCOUNTER — HOSPITAL ENCOUNTER (OUTPATIENT)
Dept: PHYSICAL THERAPY | Age: 57
Discharge: HOME | End: 2022-01-07
Payer: MEDICAID

## 2022-01-07 PROCEDURE — 97535 SELF CARE MNGMENT TRAINING: CPT

## 2022-01-07 PROCEDURE — 97110 THERAPEUTIC EXERCISES: CPT

## 2022-01-07 PROCEDURE — 97112 NEUROMUSCULAR REEDUCATION: CPT

## 2022-01-07 PROCEDURE — 97016 VASOPNEUMATIC DEVICE THERAPY: CPT

## 2022-01-07 NOTE — PROGRESS NOTES
PT DAILY TREATMENT NOTE    Patient Name: Nakita Jimenez  Date:2022  : 1965  [x]  Patient  Verified  Payor: BLUE CROSS MEDICAID / Plan: [facility] [place] Fabian Giovanna / Product Type: Managed Care Medicaid /    In time:2:00  Out time:2:55  Total Treatment Time (min): 55  Total Timed Codes (min): 55  1:1 Treatment Time (MC/BCBS only): 45   Visit #: 8 of 16    Treatment Dx: Right foot pain [M79.671]    SUBJECTIVE  Pain Level (0-10 scale): 4/10  Any medication changes, allergies to medications, adverse drug reactions, diagnosis change, or new procedure performed?: [x] No    [] Yes (see summary sheet for update)  Subjective functional status/changes:   [] No changes reported  \"I have some pain right now. It's a little swollen today. \"    OBJECTIVE    Modalities Rationale:     decrease edema, decrease inflammation and decrease pain to improve patient's ability to return to prior level of physical activity. 10 min [x]  Vasopneumatic Device, press/temp: Med/Low   If using vaso (only need to measure limb vaso being performed on)      pre-treatment girth : 56 cm      post-treatment girth : 55.5 cm      measured at (landmark location) : Figure 8 of ankle    [x] Skin assessment post-treatment (if applicable):    [x]  intact    []  redness- no adverse reaction                  []redness  adverse reaction:            25 min Therapeutic Exercise:  [x] See flow sheet :   Rationale: increase ROM, increase strength and improve coordination to improve the patients ability to return to prior level of physical activity. 10 min Neuromuscular Re-education:  [x]  See flow sheet :   Rationale: increase ROM, increase strength and improve coordination  to improve the patients ability to return to prior level of physical activity. 10 min Self Care:  Mobility and daily support education for ankle   Rationale:    increase ROM, increase strength and improve coordination to improve the patients ability to return to prior level of physical activity. With   [] TE   [] TA   [] neuro   [] other: Patient Education: [x] Review HEP    [] Progressed/Changed HEP based on:   [] positioning   [] body mechanics   [] transfers   [] heat/ice application    [] other:      Other Objective/Functional Measures:      Pain Level (0-10 scale) post treatment: 3/10    ASSESSMENT/Changes in Function: Pt tolerated session well with no increased pain. Pt continues to be limited with general ROM, demonstrating decreased range in all plains. Pt progressed to standing gastroc and soleus stretch with no increased pain. Pt received VASO post tx for decreasing swelling and pain. Patient will continue to benefit from skilled PT services to modify and progress therapeutic interventions, address functional mobility deficits, address ROM deficits, address strength deficits, analyze and address soft tissue restrictions, analyze and cue movement patterns, analyze and modify body mechanics/ergonomics and assess and modify postural abnormalities to attain remaining goals. [x]  See Plan of Care  []  See progress note/recertification  []  See Discharge Summary         Progress towards goals / Updated goals:  Short Term Goals: To be accomplished in 4 weeks:  1. Patient will be independent and compliant with HEP to progress toward goals and restore functional mobility. Eval Status: issued at eval               Current: Patient reports daily compliance with her HEP and denies having any questions about it.   12/29/21, met     2. Patient will improve FOTO score by 6 points to improve functional tolerance for exercise. Eval Status: FOTO 49  CURRENT: FOTO 63 12/27/21 Goal Met  FOTO score = an established functional score where 100 = no disability     3. Patient will improve pain in right foot to 5/10  to improve activity tolerance and restore prior level of function.   Eval Status: 9/10 at worst  Current: 4/10 pain at worst over the last week    12/29/21, met     4. Pt will have painfree right ankle and great toe AROM with at least 5 degree increase in all planes to aid in functional mechanics for ambulation/ADLs. Eval Status:                 AROM              PROM                          Left Right Left Right   Dorsiflexion 12 -10 -10     Plantarflexion 50 30 32     Inversion 15 NT per MD restrictions       Eversion 30 NT per MD restrictions       Great Toe Ext 30 25 NT     Great Toe Flex 10 10 NT      Current: same as above        Long Term Goals: To be accomplished in 8 weeks:  1. Patient will improve FOTO score by 12 points to improve functional tolerance for ambulation and ADLs. Eval Status: Barron PUCKETTHZR: GQRH 37 12/27/21 Goal Met  FOTO score = an established functional score where 100 = no disability     2. Pt will have painfree right ankle and great toe AROM with at least 15 degree improvement in all planes to aid in functional mechanics for ambulation/ADLs. Eval Status:                 AROM                          PROM                          Left Right Left Right   Dorsiflexion 12 -10 -10     Plantarflexion 50 30 32     Inversion 15 NT per MD restrictions       Eversion 30 NT per MD restrictions       Great Toe Ext 30 25 NT     Great Toe Flex 10 10 NT             3. Pt will have 5/5 LE strength to return to goals of ambulation and performing ADLs. Eval Status:         Strength (1-5)    Left  Right   Dorsiflexion 5 4   Plantarflexion 5 4   Inversion 5 NT per MD restrictions   Eversion 5 NT per MD restrictions   Great Toe Ext 5 3   Great Toe Flex 4+ +      Lower Extremity Left  (0-5) Right (0-5)   Hip Flexion (L1,2) 4+ 4+   Knee Extension (L3,4) 4+ 4   Knee Flexion (S1,2) 4 4   Hip IR 4+ 4   Hip ER 4+ 4   Hip Abduction 4+ 4   Hip Adduction 4+ 4+           4.  Patient will ambulate 500ft mod I with LRAD demonstrating symmetrical gait pattern in order to facilitate her ability to ambulate in the community.   Eval Status: 15ft with FWW WBAT Right LE with right cam boot and gait belt donned with CGA   Current: Pt currently transitioning out of cam boot with SPC during ambulation 1/4/21     5. Patient will improve pain in right foot and ankle to 1-2/10 at worst to improve activity tolerance and restore prior level of function.              Eval Status: 9/10 at worst                          Current: 4/10 at worst in the past week 1/7/22      PLAN  [x]  Upgrade activities as tolerated     [x]  Continue plan of care  []  Update interventions per flow sheet       []  Discharge due to:_  []  Other:_      Lisa Douglass, YASEMIN 1/7/2022  2:07 PM    Future Appointments   Date Time Provider Noah Gregory   1/11/2022  1:15 PM Simi Walter [facility]   1/14/2022  2:45 PM Steffany Mistry, [facility]   1/17/2022  2:45 PM Mere Amin, [facility]   1/21/2022  2:00 PM Anna Noyola [facility]   1/24/2022  2:45 PM Epimenio Low [facility]   1/28/2022  8:45 AM Steffany Mistry [facility]

## 2022-01-11 ENCOUNTER — HOSPITAL ENCOUNTER (OUTPATIENT)
Dept: PHYSICAL THERAPY | Age: 57
Discharge: HOME | End: 2022-01-11
Payer: MEDICAID

## 2022-01-11 PROCEDURE — 97016 VASOPNEUMATIC DEVICE THERAPY: CPT

## 2022-01-11 PROCEDURE — 97110 THERAPEUTIC EXERCISES: CPT

## 2022-01-11 PROCEDURE — 97535 SELF CARE MNGMENT TRAINING: CPT

## 2022-01-11 PROCEDURE — 97112 NEUROMUSCULAR REEDUCATION: CPT

## 2022-01-11 NOTE — PROGRESS NOTES
PT DAILY TREATMENT NOTE    Patient Name: nIez Sandoval  Date:2022  : 1965  [x]  Patient  Verified  Payor: BLUE CROSS MEDICAID / Plan: [facility] Stephani Everettham / Product Type: Managed Care Medicaid /    In time:4:15  Out time:5:13  Total Treatment Time (min): 58  Total Timed Codes (min): 58  1:1 Treatment Time (MC/BCBS only): 48  Visit #: 9 of 16    Treatment Dx: Right foot pain [M79.671]    SUBJECTIVE  Pain Level (0-10 scale): 4/10  Any medication changes, allergies to medications, adverse drug reactions, diagnosis change, or new procedure performed?: [x] No    [] Yes (see summary sheet for update)  Subjective functional status/changes:   [] No changes reported  \"I have pain in my foot but, I have been walking up and down the stairs today. \"    OBJECTIVE    Modalities Rationale:     decrease edema, decrease inflammation and decrease pain to improve patient's ability to return to prior level of physical activity. 10 min [x]  Vasopneumatic Device, press/temp: Med/Low   If using vaso (only need to measure limb vaso being performed on)      pre-treatment girth : 54.5 cm      post-treatment girth : 54.5 cm      measured at (landmark location) :  Figure 8 for ankle    [x] Skin assessment post-treatment (if applicable):    [x]  intact    []  redness- no adverse reaction                  []redness  adverse reaction:            28 min Therapeutic Exercise:  [x] See flow sheet :   Rationale: increase ROM, increase strength and improve coordination to improve the patients ability to return to prior level of physical activity. 10 min Neuromuscular Re-education:  [x]  See flow sheet :   Rationale: increase ROM, increase strength and improve coordination  to improve the patients ability to return to prior level of physical activity.      10 min Self Care: Reviewed stair ambulation strategies and daily ambulation education   Rationale:    increase ROM, increase strength and improve coordination to improve the patients ability to return to prior level of physical activity. With   [] TE   [] TA   [] neuro   [] other: Patient Education: [x] Review HEP    [] Progressed/Changed HEP based on:   [] positioning   [] body mechanics   [] transfers   [] heat/ice application    [] other:      Other Objective/Functional Measures:      Pain Level (0-10 scale) post treatment: 4/10    ASSESSMENT/Changes in Function: Pt tolerated session well. Pt reports no use of Cam boot in almost 2 weeks. Pt reports navigating stairs multiple times today with mild pain. Pt reported increased fatigue post tx but, not above tolerance. Pt received VASO post tx for swelling and pain reduction. Patient will continue to benefit from skilled PT services to modify and progress therapeutic interventions, address functional mobility deficits, address ROM deficits, address strength deficits, analyze and address soft tissue restrictions, analyze and cue movement patterns, analyze and modify body mechanics/ergonomics and assess and modify postural abnormalities to attain remaining goals. [x]  See Plan of Care  []  See progress note/recertification  []  See Discharge Summary         Progress towards goals / Updated goals:  Short Term Goals: To be accomplished in 4 weeks:  1. Patient will be independent and compliant with HEP to progress toward goals and restore functional mobility. Eval Status: issued at eval               Current: Patient reports daily compliance with her HEP and denies having any questions about it.   12/29/21, met     2. Patient will improve FOTO score by 6 points to improve functional tolerance for exercise. Eval Status: FOTO 49  CURRENT: FOTO 63 12/27/21 Goal Met  FOTO score = an established functional score where 100 = no disability     3. Patient will improve pain in right foot to 5/10  to improve activity tolerance and restore prior level of function.   Eval Status: 9/10 at worst  Current: 4/10 pain at worst over the last week    12/29/21, met     4. Pt will have painfree right ankle and great toe AROM with at least 5 degree increase in all planes to aid in functional mechanics for ambulation/ADLs. Eval Status:                 AROM              PROM                          Left Right Left Right   Dorsiflexion 12 -10 -10     Plantarflexion 50 30 32     Inversion 15 NT per MD restrictions       Eversion 30 NT per MD restrictions       Great Toe Ext 30 25 NT     Great Toe Flex 10 10 NT      Current: same as above        Long Term Goals: To be accomplished in 8 weeks:  1. Patient will improve FOTO score by 12 points to improve functional tolerance for ambulation and ADLs. Eval Status: Verlene Crigler                          CIJUUSD: CBBZ 17 12/27/21 Goal Met  FOTO score = an established functional score where 100 = no disability     2. Pt will have painfree right ankle and great toe AROM with at least 15 degree improvement in all planes to aid in functional mechanics for ambulation/ADLs. Eval Status:                 AROM                          PROM                          Left Right Left Right   Dorsiflexion 12 -10 -10     Plantarflexion 50 30 32     Inversion 15 NT per MD restrictions       Eversion 30 NT per MD restrictions       Great Toe Ext 30 25 NT     Great Toe Flex 10 10 NT             3. Pt will have 5/5 LE strength to return to goals of ambulation and performing ADLs. Eval Status:         Strength (1-5)    Left  Right   Dorsiflexion 5 4   Plantarflexion 5 4   Inversion 5 NT per MD restrictions   Eversion 5 NT per MD restrictions   Great Toe Ext 5 3   Great Toe Flex 4+ +      Lower Extremity Left  (0-5) Right (0-5)   Hip Flexion (L1,2) 4+ 4+   Knee Extension (L3,4) 4+ 4   Knee Flexion (S1,2) 4 4   Hip IR 4+ 4   Hip ER 4+ 4   Hip Abduction 4+ 4   Hip Adduction 4+ 4+           4.  Patient will ambulate 500ft mod I with LRAD demonstrating symmetrical gait pattern in order to facilitate her ability to ambulate in the [facility].   Eval Status: 15ft with FWW WBAT Right LE with right cam boot and gait belt donned with CGA   Current: Pt ambulating without CAM boot for over a week but, continues to utilize [facility] outside of home.  1/11/22     5. Patient will improve pain in right foot and ankle to 1-2/10 at worst to improve activity tolerance and restore prior level of function.              Eval Status: 9/10 at worst                          Current: 4/10 at worst in the past week 1/7/22        PLAN  [x]  Upgrade activities as tolerated     [x]  Continue plan of care  []  Update interventions per flow sheet       []  Discharge due to:_  []  Other:_      Nohemy Mcduffie, YASEMIN 1/11/2022  4:24 PM    Future Appointments   Date Time Provider Noah Gregory   1/14/2022  2:45 PM Caity Salinas, PT [facility]   1/17/2022  2:45 PM Lanette Larios, [facility]   1/21/2022  2:00 PM Lanette Larios [facility]   1/24/2022  2:45 PM Lanette Larios [facility]   1/28/2022  8:45 AM Caity Salinas, [address] [facility]

## 2022-01-14 ENCOUNTER — HOSPITAL ENCOUNTER (OUTPATIENT)
Dept: PHYSICAL THERAPY | Age: 57
Discharge: HOME | End: 2022-01-14
Payer: MEDICAID

## 2022-01-14 PROCEDURE — 97530 THERAPEUTIC ACTIVITIES: CPT

## 2022-01-14 PROCEDURE — 97112 NEUROMUSCULAR REEDUCATION: CPT

## 2022-01-14 PROCEDURE — 97016 VASOPNEUMATIC DEVICE THERAPY: CPT

## 2022-01-14 PROCEDURE — 97110 THERAPEUTIC EXERCISES: CPT

## 2022-01-17 ENCOUNTER — HOSPITAL ENCOUNTER (OUTPATIENT)
Dept: PHYSICAL THERAPY | Age: 57
Discharge: HOME | End: 2022-01-17
Payer: MEDICAID

## 2022-01-17 PROCEDURE — 97530 THERAPEUTIC ACTIVITIES: CPT

## 2022-01-17 PROCEDURE — 97016 VASOPNEUMATIC DEVICE THERAPY: CPT

## 2022-01-17 PROCEDURE — 97110 THERAPEUTIC EXERCISES: CPT

## 2022-01-17 NOTE — PROGRESS NOTES
PT DAILY TREATMENT NOTE    Patient Name: Kiana Loja  Date:2022  : 1965  [x]  Patient  Verified  Payor: BLUE CROSS MEDICAID / Plan: [address] [place] [address] / Product Type: Managed Care Medicaid /    In time:245  Out time: 330  Total Treatment Time (min): 45  Total Timed Codes (min): 30  1:1 Treatment Time (MC/BCBS only): 30   Visit #: 11 of 16    Treatment Dx: Right foot pain [M79.671]    SUBJECTIVE  Pain Level (0-10 scale): 8/10  Any medication changes, allergies to medications, adverse drug reactions, diagnosis change, or new procedure performed?: [x] No    [] Yes (see summary sheet for update)  Subjective functional status/changes:   [] No changes reported  Patient reports walking around the house without her QC. Patient reported that she feels like she is walking on a rock (right foot). She indicated mid arch region and she has not used her arch supports.     OBJECTIVE    Modalities Rationale:     decrease edema, decrease inflammation and decrease pain to improve patient's ability to ambulate with decreased pain   min [] Estim, type/location:                                      []  att     []  unatt     []  w/US     []  w/ice    []  w/heat    min []  Mechanical Traction: type/lbs                   []  pro   []  sup   []  int   []  cont    []  before manual    []  after manual    min []  Ultrasound, settings/location:      min []  Iontophoresis w/ dexamethasone, location:                                               []  take home patch       []  in clinic    min []  Ice     []  Heat    location/position:    10 min [x]  Vasopneumatic Device, press/temp: Med : 34 degree   If using vaso (only need to measure limb vaso being performed on)      pre-treatment girth : 57 cm      post-treatment girth : 54.5 cm      measured at (landmark location) :  Figure 8 right ankle    min []  Other:    [] Skin assessment post-treatment (if applicable):    []  intact    []  redness- no adverse reaction                  []redness  adverse reaction:        15 min Therapeutic Exercise:  [x] See flow sheet :   Rationale: increase ROM, increase strength and improve coordination to improve the patients ability to return to PLOF    15 min Therapeutic Activity:  [x]  See flow sheet : gait analysis, instructed pt on scar tissue mobilization   Rationale: increase ROM, increase strength and improve coordination  to improve the patients ability to ambulate with decreased pain       With   [] TE   [] TA   [] neuro   [] other: Patient Education: [x] Review HEP    [] Progressed/Changed HEP based on:   [] positioning   [] body mechanics   [] transfers   [] heat/ice application    [] other:         Pain Level (0-10 scale) post treatment: 6/10    ASSESSMENT/Changes in Function: Patient tolerated treatment session well today. Patient had no complaints with addition of peach theraband to DF  to exercise program to accomplish increase strength in DF. Patient continues to have difficulty with flexion of toes. Patient continues to make steady progress toward goals and would benefit from continued skilled PT intervention to address remaining deficits outlined in goals below. Patient will continue to benefit from skilled PT services to modify and progress therapeutic interventions, address functional mobility deficits, address ROM deficits, address strength deficits, analyze and address soft tissue restrictions, analyze and cue movement patterns, analyze and modify body mechanics/ergonomics and address imbalance/dizziness to attain remaining goals. [x]  See Plan of Care  []  See progress note/recertification  []  See Discharge Summary         Progress towards goals / Updated goals:  Short Term Goals: To be accomplished in 4 weeks:  1. Patient will be independent and compliant with HEP to progress toward goals and restore functional mobility.    Eval Status: issued at eval               Current: Patient reports daily compliance with her HEP and denies having any questions about it.   12/29/21, met     2. Patient will improve FOTO score by 6 points to improve functional tolerance for exercise. Eval Status: FOTO 49  CURRENT: FOTO 63 12/27/21 Goal Met  FOTO score = an established functional score where 100 = no disability     3. Patient will improve pain in right foot to 5/10  to improve activity tolerance and restore prior level of function. Eval Status: 9/10 at worst  Current: 4/10 pain at worst over the last week    12/29/21, met     4. Pt will have painfree right ankle and great toe AROM with at least 5 degree increase in all planes to aid in functional mechanics for ambulation/ADLs. Eval Status:                 AROM              PROM                          Left Right Left Right   Dorsiflexion 12 -10 -10     Plantarflexion 50 30 32     Inversion 15 NT per MD restrictions       Eversion 30 NT per MD restrictions       Great Toe Ext 30 25 NT     Great Toe Flex 10 10 NT      Current: same as above        Long Term Goals: To be accomplished in 8 weeks:  1. Patient will improve FOTO score by 12 points to improve functional tolerance for ambulation and ADLs. Eval Status: Jacqueline Vuong                          WSRDJCW: HZJA 96 12/27/21 Goal Met  FOTO score = an established functional score where 100 = no disability     2. Pt will have painfree right ankle and great toe AROM with at least 15 degree improvement in all planes to aid in functional mechanics for ambulation/ADLs. Eval Status:                 AROM                          PROM                          Left Right Left Right   Dorsiflexion 12 -10 -10     Plantarflexion 50 30 32     Inversion 15 NT per MD restrictions       Eversion 30 NT per MD restrictions       Great Toe Ext 30 25 NT     Great Toe Flex 10 10 NT             3. Pt will have 5/5 LE strength to return to goals of ambulation and performing ADLs.   Eval Status:         Strength (1-5)    Left  Right   Dorsiflexion 5 4   Plantarflexion 5 4   Inversion 5 NT per MD restrictions   Eversion 5 NT per MD restrictions   Great Toe Ext 5 3   Great Toe Flex 4+ +      Lower Extremity Left  (0-5) Right (0-5)   Hip Flexion (L1,2) 4+ 4+   Knee Extension (L3,4) 4+ 4   Knee Flexion (S1,2) 4 4   Hip IR 4+ 4   Hip ER 4+ 4   Hip Abduction 4+ 4   Hip Adduction 4+ 4+           4. Patient will ambulate 500ft mod I with LRAD demonstrating symmetrical gait pattern in order to facilitate her ability to ambulate in the community.   Eval Status: 15ft with FWW WBAT Right LE with right cam boot and gait belt donned with CGA   Current: Pt ambulating without CAM boot for over a week but, continues to utilize [facility] outside of home.  1/11/22     5. Patient will improve pain in right foot and ankle to 1-2/10 at worst to improve activity tolerance and restore prior level of function.              Eval Status: 9/10 at worst                          Current: 4/10 at worst in the past week 1/14/22 Progressing                           Current:  8/10 at worst (started yesterday) 1/17/22 Progressing      PLAN  []  Upgrade activities as tolerated     [x]  Continue plan of care  []  Update interventions per flow sheet       []  Discharge due to:_  []  Other:_      Abisai Dixon PT 1/17/2022  2:41 PM    Future Appointments   Date Time Provider Noah Gregory   1/17/2022  2:45 PM John Amaya PT [facility]   1/21/2022  2:00 PM John Amaya PT [facility]   1/24/2022  2:45 PM Enrico Otto PT [facility]   1/28/2022  8:45 AM Dayna Murguia, [facility]

## 2022-01-21 ENCOUNTER — APPOINTMENT (OUTPATIENT)
Dept: PHYSICAL THERAPY | Age: 57
End: 2022-01-21
Payer: MEDICAID

## 2022-01-24 ENCOUNTER — HOSPITAL ENCOUNTER (OUTPATIENT)
Dept: PHYSICAL THERAPY | Age: 57
Discharge: HOME | End: 2022-01-24
Payer: MEDICAID

## 2022-01-24 PROCEDURE — 97112 NEUROMUSCULAR REEDUCATION: CPT

## 2022-01-24 PROCEDURE — 97530 THERAPEUTIC ACTIVITIES: CPT

## 2022-01-24 PROCEDURE — 97110 THERAPEUTIC EXERCISES: CPT

## 2022-01-24 NOTE — PROGRESS NOTES
PT DAILY TREATMENT NOTE    Patient Name: Irlanda Rizzo  Date:2022  : 1965  [x]  Patient  Verified  Payor: BLUE CROSS MEDICAID / Plan: [facility] Josiane Jose Elias / Product Type: Managed Care Medicaid /    In time:250  Out time:335  Total Treatment Time (min): 45  Total Timed Codes (min): 45  1:1 Treatment Time (MC/BCBS only): 45  Visit #: 12 of 16    Treatment Dx: Right foot pain [M79.671]    SUBJECTIVE  Pain Level (0-10 scale): Any medication changes, allergies to medications, adverse drug reactions, diagnosis change, or new procedure performed?: [x] No    [] Yes (see summary sheet for update)  Subjective functional status/changes:   [] No changes reported  Pt reports of right foot pain today. OBJECTIVE    15 min Therapeutic Exercise:  [x] See flow sheet :   Rationale: increase ROM, increase strength and improve coordination to improve the patients ability to restore PLOF      15 min Therapeutic Activity:  [x]  See flow sheet :   Rationale: increase ROM, increase strength and improve coordination  to improve the patients ability to complete transfers and ADLs without external assist       15 min Neuromuscular Re-education:  [x]  See flow sheet :   Rationale: increase ROM, increase strength and improve coordination  to improve the patients ability to stabilize ankle      With   [x] TE   [x] TA   [x] neuro   [] other: Patient Education: [x] Review HEP    [x] Progressed/Changed HEP based on:   [x] positioning   [x] body mechanics   [] transfers   [] heat/ice application    [] other:        ASSESSMENT/Changes in Function: Patient tolerated treatment session well today. Patient had no complaints with exercise program to accomplish ROM and strengthening. Pt continue to be limited with DF/PF ROM and pain persist with right leg consistently at home. PT has helped to improve with pain management and strengthening with ADLs and gait.   Patient continues to make steady progress toward goals and would benefit from continued skilled PT intervention to address remaining deficits outlined in goals below. Patient will continue to benefit from skilled PT services to modify and progress therapeutic interventions, address functional mobility deficits, address ROM deficits, address strength deficits, analyze and address soft tissue restrictions, analyze and cue movement patterns, analyze and modify body mechanics/ergonomics and assess and modify postural abnormalities to attain remaining goals. [x]  See Plan of Care  [x]  See progress note/recertification  []  See Discharge Summary         Progress towards goals / Updated goals:  Short Term Goals: To be accomplished in 4 weeks:  1. Patient will be independent and compliant with HEP to progress toward goals and restore functional mobility. Eval Status: issued at eval               Current: Patient reports daily compliance with her HEP and denies having any questions about it.   12/29/21, met     2. Patient will improve FOTO score by 6 points to improve functional tolerance for exercise. Eval Status: FOTO 49  CURRENT: FOTO 63 12/27/21 Goal Met  FOTO score = an established functional score where 100 = no disability     3. Patient will improve pain in right foot to 5/10  to improve activity tolerance and restore prior level of function. Eval Status: 9/10 at worst  Current: 4/10 pain at worst over the last week    12/29/21, met     4. Pt will have painfree right ankle and great toe AROM with at least 5 degree increase in all planes to aid in functional mechanics for ambulation/ADLs.   Eval Status:                 AROM              PROM                          Left Right Left Right AROM  Right 1/24/22   Dorsiflexion 12 -10 -10   -15   Plantarflexion 50 30 32   35   Inversion 15 NT per MD restrictions     NT   Eversion 30 NT per MD restrictions     NT   Great Toe Ext 30 25 NT   NT   Great Toe Flex 10 10 NT   NT    Current: same as above        Long Term Goals: To be accomplished in 8 weeks:  1. Patient will improve FOTO score by 12 points to improve functional tolerance for ambulation and ADLs. Eval Status: Du Link                          NVGQEPD: [facility] 80 12/27/21 Goal Met  FOTO score = an established functional score where 100 = no disability     2. Pt will have painfree right ankle and great toe AROM with at least 15 degree improvement in all planes to aid in functional mechanics for ambulation/ADLs. Eval Status:                 AROM                          PROM                          Left Right Left Right L R  1/24/22   Dorsiflexion 12 -10 -10    -15   Plantarflexion 50 30 32    33   Inversion 15 NT per MD restrictions      NT   Eversion 30 NT per MD restrictions      NT   Great Toe Ext 30 25 NT    NT   Great Toe Flex 10 10 NT    NT           3. Pt will have 5/5 LE strength to return to goals of ambulation and performing ADLs. Eval Status:         Strength (1-5)    Left  Right L  1/24/22 R  1/24/22   Dorsiflexion 5 4 5 4+   Plantarflexion 5 4 5 4+   Inversion 5 NT per MD restrictions 5    Eversion 5 NT per MD restrictions 5    Great Toe Ext 5 3  5   Great Toe Flex 4+ +  4+      Lower Extremity Left  (0-5) Right (0-5) Left  1/24/22 Right  1/24/22   Hip Flexion (L1,2) 4+ 4+ 5 4+   Knee Extension (L3,4) 4+ 4 5 5   Knee Flexion (S1,2) 4 4 5 5   Hip IR 4+ 4 5 5   Hip ER 4+ 4 5 4   Hip Abduction 4+ 4 4+ 4+   Hip Adduction 4+ 4+ 5 5           4. Patient will ambulate 500ft mod I with LRAD demonstrating symmetrical gait pattern in order to facilitate her ability to ambulate in the community.   Eval Status: 15ft with FWW WBAT Right LE with right cam boot and gait belt donned with CGA   Current: Pt ambulating without CAM boot for over a week but, continues to utilize [facility] outside of home. 1/11/22  Current: pt ambulating with SPC, pt perferred SPC on prior to surgery.  Pt education on using SPC on the left for good  for 434 ft. 1/24/22 progressing     5. Patient will improve pain in right foot and ankle to 1-2/10 at worst to improve activity tolerance and restore prior level of function.              Eval Status: 9/10 at worst                          Current: 4/10 at worst in the past week 1/14/22 Progressing                           Current: 7/10 at worst since last Saturday.  1/24/22 progressing        PLAN  [x]  Upgrade activities as tolerated     [x]  Continue plan of care  []  Update interventions per flow sheet       []  Discharge due to:_  []  Other:_      MIGUELINA Moore 1/24/2022  2:55 PM    Future Appointments   Date Time Provider Noah Gregory   1/28/2022  8:45 AM Jessica Nath PT [facility]

## 2022-01-24 NOTE — PROGRESS NOTES
[facility]  [address] [facility] [address] [address] [place], [address] [facility] [place]  Ph [phone]  Fx [phone]    Physical Therapy Progress Note  Patient name: Marlo Landers Start of Care: 2021   Referral Nikos Shah MD : 1965                Medical Diagnosis: Right foot pain [M79.671]    Onset Date:21                Treatment Diagnosis: Right foot pain                                              ICD-10: M79.671   Prior Hospitalization: see medical history Provider#: [id number]   Medications: Verified on Patient summary List    Comorbidities: Right foot subtalar, talonavicular, and 2nd TMT fusion 21; Bilat TKA; Hx of bilat shoulder pain; OA in right hip and low back; Osteoporosis; Parathyroids removed ; HTN; Kidney disease   Prior Level of Function: functionally independent, no AD, active lifestyle; 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 Patient will be independent and compliant with HEP to progress toward goals and restore functional mobility. Eval Status: issued at eval               Current: Patient reports daily compliance with her HEP and denies having any questions about it.   21, met     2. Patient will improve FOTO score by 6 points to improve functional tolerance for exercise. Eval Status: FOTO 49  CURRENT: FOTO 63 21 Goal Met  FOTO score = an established functional score where 100 = no disability     3. Patient will improve pain in right foot to 5/10  to improve activity tolerance and restore prior level of function. Eval Status: 9/10 at worst  Current: 4/10 pain at worst over the last week    21, met     4. Pt will have painfree right ankle and great toe AROM with at least 5 degree increase in all planes to aid in functional mechanics for ambulation/ADLs.   Eval Status:                 AROM              PROM                          Left Right Left Right AROM  Right 1/24/22   Dorsiflexion 12 -10 -10   -15   Plantarflexion 50 30 32   35   Inversion 15 NT per MD restrictions     NT   Eversion 30 NT per MD restrictions     NT   Great Toe Ext 30 25 NT   NT   Great Toe Flex 10 10 NT   NT    Current: same as above        Long Term Goals: To be accomplished in 8 weeks:  1. Patient will improve FOTO score by 12 points to improve functional tolerance for ambulation and ADLs. Eval Status: Marcin Mckeon                          OXHFMTE: AWBY 19 12/27/21 Goal Met  FOTO score = an established functional score where 100 = no disability     2. Pt will have painfree right ankle and great toe AROM with at least 15 degree improvement in all planes to aid in functional mechanics for ambulation/ADLs. Eval Status:                 AROM                          PROM                                    Left Right Left Right L R  1/24/22   Dorsiflexion 12 -10 -10     -15   Plantarflexion 50 30 32     33   Inversion 15 NT per MD restrictions       NT   Eversion 30 NT per MD restrictions       NT   Great Toe Ext 30 25 NT     NT   Great Toe Flex 10 10 NT     NT           3. Pt will have 5/5 LE strength to return to goals of ambulation and performing ADLs. Eval Status:         Strength (1-5)    Left  Right L  1/24/22 R  1/24/22   Dorsiflexion 5 4 5 4+   Plantarflexion 5 4 5 4+   Inversion 5 NT per MD restrictions 5     Eversion 5 NT per MD restrictions 5     Great Toe Ext 5 3   5   Great Toe Flex 4+ +   4+      Lower Extremity Left  (0-5) Right (0-5) Left  1/24/22 Right  1/24/22   Hip Flexion (L1,2) 4+ 4+ 5 4+   Knee Extension (L3,4) 4+ 4 5 5   Knee Flexion (S1,2) 4 4 5 5   Hip IR 4+ 4 5 5   Hip ER 4+ 4 5 4   Hip Abduction 4+ 4 4+ 4+   Hip Adduction 4+ 4+ 5 5           4.  Patient will ambulate 500ft mod I with LRAD demonstrating symmetrical gait pattern in order to facilitate her ability to ambulate in the community.   Eval Status: 15ft with FWW WBAT Right LE with right cam boot and gait belt donned with CGA   Current: Pt ambulating without CAM boot for over a week but, continues to utilize [facility] outside of home. 1/11/22  Current: pt ambulating with SPC, pt perferred SPC on prior to surgery. Pt education on using SPC on the left for good  for 434 ft. 1/24/22 progressing     5. Patient will improve pain in right foot and ankle to 1-2/10 at worst to improve activity tolerance and restore prior level of function.              Eval Status: 9/10 at worst                          Current: 4/10 at worst in the past week 1/14/22 Progressing                           Current: 7/10 at worst since last Saturday. 1/24/22 progressing        Summary of Care/ Key Functional Changes:   Patient is a 64 yo female who [facility] with c/o right foot and ankle pain on 11/22/21. Patient has attended 12 sessions including IE. Patient tolerated treatment session well today. Patient had no complaints with exercise program to accomplish ROM and strengthening. Pt continues to be limited with DF/PF ROM and pain persists with right leg consistently at home. Pt reports that PT tx has helped to improve with pain management and strengthening with ADLs and gait. Patient continues to make steady progress toward goals and would benefit from continued skilled PT intervention to address remaining deficits outlined in goals below.     Patient will continue to benefit from skilled PT services to modify and progress therapeutic interventions, address functional mobility deficits, address ROM deficits, address strength deficits, analyze and address soft tissue restrictions, analyze and cue movement patterns, analyze and modify body mechanics/ergonomics and assess and modify postural abnormalities to attain remaining goals.     ASSESSMENT/RECOMMENDATIONS:  [x]Continue therapy per initial plan/protocol at a frequency of  2 x per week for 6 weeks  []Continue therapy with the following recommended changes:_____________________ _____________________________ ________________________________________  []Discontinue therapy progressing towards or have reached established goals  []Discontinue therapy due to lack of appreciable progress towards goals  []Discontinue therapy due to lack of attendance or compliance  []Await Physician's recommendations/decisions regarding therapy  []Other:________________________________________________________________    Thank you for this referral.   MIGUELINA Webster 1/24/2022 2:53 PM

## 2022-01-26 ENCOUNTER — TELEPHONE (OUTPATIENT)
Dept: PHYSICAL THERAPY | Age: 57
End: 2022-01-26

## 2022-01-28 ENCOUNTER — APPOINTMENT (OUTPATIENT)
Dept: PHYSICAL THERAPY | Age: 57
End: 2022-01-28
Payer: MEDICAID

## 2022-01-31 ENCOUNTER — HOSPITAL ENCOUNTER (OUTPATIENT)
Dept: PHYSICAL THERAPY | Age: 57
Discharge: HOME | End: 2022-01-31
Payer: MEDICAID

## 2022-01-31 PROCEDURE — 97110 THERAPEUTIC EXERCISES: CPT

## 2022-01-31 PROCEDURE — 97535 SELF CARE MNGMENT TRAINING: CPT

## 2022-01-31 PROCEDURE — 97016 VASOPNEUMATIC DEVICE THERAPY: CPT

## 2022-01-31 PROCEDURE — 97112 NEUROMUSCULAR REEDUCATION: CPT

## 2022-01-31 NOTE — PROGRESS NOTES
PT DAILY TREATMENT NOTE    Patient Name: Bobby Toscano  Date:2022  : 1965  [x]  Patient  Verified  Payor: BLUE CROSS MEDICAID / Plan: [address] [place] [id number] / Product Type: Managed Care Medicaid /    In time:12:32  Out time:1:30  Total Treatment Time (min): 58  Total Timed Codes (min): 58  1:1 Treatment Time (MC/BCBS only): 48   Visit #: 12 of 24     Treatment Dx: Right foot pain [M79.671]    SUBJECTIVE  Pain Level (0-10 scale): 3/10  Any medication changes, allergies to medications, adverse drug reactions, diagnosis change, or new procedure performed?: [x] No    [] Yes (see summary sheet for update)  Subjective functional status/changes:   [] No changes reported  \"The doctor diagnosed me with atrial fibulation on Friday (22) and is going to give me Cardioversion shock this week. The doctor said I was fine to do all my seated ankle stuff as long as I didn't get too out of breath. \"    OBJECTIVE    Modalities Rationale:     decrease edema, decrease inflammation and decrease pain to improve patient's ability to return to prior level of physical activity. 10 min [x]  Vasopneumatic Device, press/temp: Mid-patella   If using vaso (only need to measure limb vaso being performed on)      pre-treatment girth : 55 cm      post-treatment girth : 55 cm      measured at (landmark location) :  Mid-patella   [x] Skin assessment post-treatment (if applicable):    [x]  intact    []  redness- no adverse reaction                  []redness  adverse reaction:            28 min Therapeutic Exercise:  [x] See flow sheet :   Rationale: increase ROM, increase strength and improve coordination to improve the patients ability to return to prior level of physical activity. 10 min Neuromuscular Re-education:  [x]  See flow sheet :   Rationale: increase ROM, increase strength and improve coordination  to improve the patients ability to return to prior level of physical activity.      10 min Self Care: Reviewed HEP and performance management   Rationale:    increase ROM, increase strength and improve coordination to improve the patients ability to return to prior level of physical activity. With   [] TE   [] TA   [] neuro   [] other: Patient Education: [x] Review HEP    [] Progressed/Changed HEP based on:   [] positioning   [] body mechanics   [] transfers   [] heat/ice application    [] other:      Other Objective/Functional Measures:     Pain Level (0-10 scale) post treatment: 2/10    ASSESSMENT/Changes in Function: Pt directed in seated ankle therex due to upcoming procedure for Atrial Fibulation diagnosis. Pt reported she was allowed to perform any ankle exercises that did not cause her to become short of breath. Pt tolerated all seated ankle therex with no increased pain but, moderate increased fatigue. Pt demonstrated improvement with marbles performance, able to achieve 10 marbles. Pt received VASO post tx for reduction of swelling and pain. Patient will continue to benefit from skilled PT services to modify and progress therapeutic interventions, address functional mobility deficits, address ROM deficits, address strength deficits, analyze and address soft tissue restrictions, analyze and cue movement patterns, analyze and modify body mechanics/ergonomics and assess and modify postural abnormalities to attain remaining goals. [x]  See Plan of Care  []  See progress note/recertification  []  See Discharge Summary         Progress towards goals / Updated goals:  Short Term Goals: To be accomplished in 4 weeks:  1. Patient will be independent and compliant with HEP to progress toward goals and restore functional mobility. Eval Status: issued at eval               Current: Patient reports daily compliance with her HEP and denies having any questions about it.   12/29/21, met     2. Patient will improve FOTO score by 6 points to improve functional tolerance for exercise.    Eval Status: BOVT 31  CURRENT: FOTO 63 12/27/21 Goal Met  FOTO score = an established functional score where 100 = no disability     3. Patient will improve pain in right foot to 5/10  to improve activity tolerance and restore prior level of function. Eval Status: 9/10 at worst  Current: 4/10 pain at worst over the last week    12/29/21, met     4. Pt will have painfree right ankle and great toe AROM with at least 5 degree increase in all planes to aid in functional mechanics for ambulation/ADLs. Eval Status:                 AROM              PROM                          Left Right Left Right   Dorsiflexion 12 -10 -10     Plantarflexion 50 30 32     Inversion 15 NT per MD restrictions       Eversion 30 NT per MD restrictions       Great Toe Ext 30 25 NT     Great Toe Flex 10 10 NT      Current: same as above        Long Term Goals: To be accomplished in 8 weeks:  1. Patient will improve FOTO score by 12 points to improve functional tolerance for ambulation and ADLs. Eval Status: Eladio Sierar                          PJNIQQU: FXAV 52 12/27/21 Goal Met  FOTO score = an established functional score where 100 = no disability     2. Pt will have painfree right ankle and great toe AROM with at least 15 degree improvement in all planes to aid in functional mechanics for ambulation/ADLs. Eval Status:                 AROM                          PROM                          Left Right Left Right   Dorsiflexion 12 -10 -10     Plantarflexion 50 30 32     Inversion 15 NT per MD restrictions       Eversion 30 NT per MD restrictions       Great Toe Ext 30 25 NT     Great Toe Flex 10 10 NT             3. Pt will have 5/5 LE strength to return to goals of ambulation and performing ADLs.   Eval Status:         Strength (1-5)    Left  Right   Dorsiflexion 5 4   Plantarflexion 5 4   Inversion 5 NT per MD restrictions   Eversion 5 NT per MD restrictions   Great Toe Ext 5 3   Great Toe Flex 4+ +      Lower Extremity Left  (0-5) Right (0-5)   Hip Flexion (L1,2) 4+ 4+   Knee Extension (L3,4) 4+ 4   Knee Flexion (S1,2) 4 4   Hip IR 4+ 4   Hip ER 4+ 4   Hip Abduction 4+ 4   Hip Adduction 4+ 4+      Current: Continues to progress with ROM exercises at this time with HEP 1/31/22     4. Patient will ambulate 500ft mod I with LRAD demonstrating symmetrical gait pattern in order to facilitate her ability to ambulate in the community.   Eval Status: 15ft with FWW WBAT Right LE with right cam boot and gait belt donned with CGA   Current: Pt ambulating without CAM boot for over a week but, continues to utilize [facility] outside of home.  1/11/22     5. Patient will improve pain in right foot and ankle to 1-2/10 at worst to improve activity tolerance and restore prior level of function.              Eval Status: 9/10 at worst                          Current: 4/10 at worst in the past week 1/7/22           PLAN  [x]  Upgrade activities as tolerated     [x]  Continue plan of care  []  Update interventions per flow sheet       []  Discharge due to:_  []  Other:_      Memo Baires PTA 1/31/2022  12:39 PM    Future Appointments   Date Time Provider Noah Gregory   2/2/2022  9:30 AM Jorge Castillo PT [facility]   [facility]2022 [facility]   2/9/2022 11:45 AM Curtis Chacon PTA [facility]   2/14/2022 12:30 PM Jorge Castillo PT [facility]   2/16/2022 12:30 PM Jorge Castillo PT [facility]   2/21/2022 11:45 AM Curtis Chacon PTA [facility]   2/23/2022 11:00 AM Curtis Chacon PTA [facility]

## 2022-02-01 ENCOUNTER — TELEPHONE (OUTPATIENT)
Dept: PHYSICAL THERAPY | Age: 57
End: 2022-02-01

## 2022-02-02 ENCOUNTER — HOSPITAL ENCOUNTER (OUTPATIENT)
Dept: PHYSICAL THERAPY | Age: 57
Discharge: HOME | End: 2022-02-02
Payer: MEDICAID

## 2022-02-02 PROCEDURE — 97110 THERAPEUTIC EXERCISES: CPT

## 2022-02-02 PROCEDURE — 97112 NEUROMUSCULAR REEDUCATION: CPT

## 2022-02-02 PROCEDURE — 97016 VASOPNEUMATIC DEVICE THERAPY: CPT

## 2022-02-02 NOTE — PROGRESS NOTES
PT DAILY TREATMENT NOTE    Patient Name: Miceala Villalobos  Date:2022  : 1965  [x]  Patient  Verified  Payor: BLUE CROSS MEDICAID / Plan: [facility] CROSS Adair Mooney / Product Type: Managed Care Medicaid /    In time:934  Out time:1019  Total Treatment Time (min): 45  Total Timed Codes (min): 35  1:1 Treatment Time (MC/BCBS only): 35   Visit #: 13 of 24    Treatment Dx: Right foot pain [M79.671]    SUBJECTIVE  Pain Level (0-10 scale): 3/10  Any medication changes, allergies to medications, adverse drug reactions, diagnosis change, or new procedure performed?: [x] No    [] Yes (see summary sheet for update)  Subjective functional status/changes:   [] No changes reported  Pt reports of sharp sensation with her right foot. Pt mentioned that she will have a follow-up with her cardiologist with her A-fib procedure.      OBJECTIVE    Modalities Rationale:     decrease edema, decrease inflammation, decrease pain and increase tissue extensibility to improve patient's ability to return to PLOF   min [] Estim, type/location:                                      []  att     []  unatt     []  w/US     []  w/ice    []  w/heat    min []  Mechanical Traction: type/lbs                   []  pro   []  sup   []  int   []  cont    []  before manual    []  after manual    min []  Ultrasound, settings/location:      min []  Iontophoresis w/ dexamethasone, location:                                               []  take home patch       []  in clinic    min []  Ice     []  Heat    location/position:    10 min [x]  Vasopneumatic Device, press/temp:    If using vaso (only need to measure limb vaso being performed on)      pre-treatment girth : 58 cm      post-treatment girth : 57 cm      measured at (landmark location) :  Right ankle circumference    min []  Other:    [] Skin assessment post-treatment (if applicable):    [x]  intact    []  redness- no adverse reaction                  []redness - adverse reaction: 20 min Therapeutic Exercise:  [x] See flow sheet :   Rationale: increase ROM, increase strength and improve coordination to improve the patients ability to restore PLOF     15 min Neuromuscular Re-education:  [x]  See flow sheet :   Rationale: increase ROM, increase strength and improve coordination  to improve the patients ability to return to prior level of physical activity          With   [x] TE   [x] TA   [x] neuro   [] other: Patient Education: [x] Review HEP    [x] Progressed/Changed HEP based on:   [x] positioning   [x] body mechanics   [] transfers   [] heat/ice application    [] other:         Pain Level (0-10 scale) post treatment: 3/10    ASSESSMENT/Changes in Function:   Patient tolerated treatment session well today. Patient had no complaints with exercise program to accomplish right ankle ROM and strengthening. Due to upcoming Cardiac Ablation, PT held exercises that would stress cardiac system. Pt is scheduled on Feb 13th. Patient continues to make steady progress toward goals and would benefit from continued skilled PT intervention to address remaining deficits outlined in goals below. Patient will continue to benefit from skilled PT services to modify and progress therapeutic interventions, address functional mobility deficits, address ROM deficits, address strength deficits, analyze and address soft tissue restrictions, analyze and cue movement patterns, analyze and modify body mechanics/ergonomics and assess and modify postural abnormalities to attain remaining goals. [x]  See Plan of Care  []  See progress note/recertification  []  See Discharge Summary         Progress towards goals / Updated goals:  Short Term Goals: To be accomplished in 4 weeks:  Patient will be independent and compliant with HEP to progress toward goals and restore functional mobility.    Eval Status: issued at eval               Current: Patient reports daily compliance with her HEP and denies having any questions about it.   12/29/21, met     Patient will improve FOTO score by 6 points to improve functional tolerance for exercise. Eval Status: FOTO 49  CURRENT: FOTO 63 12/27/21 Goal Met  FOTO score = an established functional score where 100 = no disability     Patient will improve pain in right foot to 5/10  to improve activity tolerance and restore prior level of function. Eval Status: 9/10 at worst  Current: 4/10 pain at worst over the last week    12/29/21, met     Pt will have painfree right ankle and great toe AROM with at least 5 degree increase in all planes to aid in functional mechanics for ambulation/ADLs. Eval Status:                 AROM              PROM                          Left Right Left Right   Dorsiflexion 12 -10 -10     Plantarflexion 50 30 32     Inversion 15 NT per MD restrictions       Eversion 30 NT per MD restrictions       Great Toe Ext 30 25 NT     Great Toe Flex 10 10 NT      Current: same as above        Long Term Goals: To be accomplished in 8 weeks:  Patient will improve FOTO score by 12 points to improve functional tolerance for ambulation and ADLs. Eval Status: FOTO 49                          CURRENT: FOTO 63 12/27/21 Goal Met  FOTO score = an established functional score where 100 = no disability     Pt will have painfree right ankle and great toe AROM with at least 15 degree improvement in all planes to aid in functional mechanics for ambulation/ADLs. Eval Status:                 AROM                          PROM                          Left Right Left Right   Dorsiflexion 12 -10 -10     Plantarflexion 50 30 32     Inversion 15 NT per MD restrictions       Eversion 30 NT per MD restrictions       Great Toe Ext 30 25 NT     Great Toe Flex 10 10 NT             Pt will have 5/5 LE strength to return to goals of ambulation and performing ADLs.   Eval Status:         Strength (1-5)    Left  Right   Dorsiflexion 5 4   Plantarflexion 5 4   Inversion 5 NT per MD restrictions Eversion 5 NT per MD restrictions   Great Toe Ext 5 3   Great Toe Flex 4+ +      Lower Extremity Left  (0-5) Right (0-5)   Hip Flexion (L1,2) 4+ 4+   Knee Extension (L3,4) 4+ 4   Knee Flexion (S1,2) 4 4   Hip IR 4+ 4   Hip ER 4+ 4   Hip Abduction 4+ 4   Hip Adduction 4+ 4+      Current: Continues to progress with ROM exercises at this time with HEP 1/31/22     Patient will ambulate 500ft mod I with LRAD demonstrating symmetrical gait pattern in order to facilitate her ability to ambulate in the community. Eval Status: 15ft with FWW WBAT Right LE with right cam boot and gait belt donned with CGA   Current: Pt ambulating without CAM boot for over a week but, continues to utilize [address] outside of home. 1/11/22     5. Patient will improve pain in right foot and ankle to 1-2/10 at worst to improve activity tolerance and restore prior level of function. Eval Status: 9/10 at worst               Current: 5/10 at worst when going to bed.  2/2/22 In Progress        PLAN  [x]  Upgrade activities as tolerated     [x]  Continue plan of care  []  Update interventions per flow sheet       []  Discharge due to:_  []  Other:_      MIGUELINA Hahn 2/2/2022  8:58 AM    Future Appointments   Date Time Provider Noah Gregory   2/2/2022  9:30 AM Gibran Sears PT [facility]   2/7/2022 11:45 AM [facility]   2/9/2022 11:45 AM [facility]   2/14/2022  5:00 PM [facility]   2/16/2022 12:30 PM Gibran Sears PT [facility]   2/21/2022 11:45 AM [facility]   2/23/2022 11:00 AM [facility]

## 2022-02-07 ENCOUNTER — APPOINTMENT (OUTPATIENT)
Dept: PHYSICAL THERAPY | Age: 57
End: 2022-02-07
Payer: MEDICAID

## 2022-02-09 ENCOUNTER — APPOINTMENT (OUTPATIENT)
Dept: PHYSICAL THERAPY | Age: 57
End: 2022-02-09
Payer: MEDICAID

## 2022-02-14 ENCOUNTER — HOSPITAL ENCOUNTER (OUTPATIENT)
Dept: PHYSICAL THERAPY | Age: 57
End: 2022-02-14
Payer: MEDICAID

## 2022-02-14 ENCOUNTER — TELEPHONE (OUTPATIENT)
Dept: PHYSICAL THERAPY | Age: 57
End: 2022-02-14

## 2022-02-15 ENCOUNTER — TELEPHONE (OUTPATIENT)
Dept: PHYSICAL THERAPY | Age: 57
End: 2022-02-15

## 2022-02-16 ENCOUNTER — APPOINTMENT (OUTPATIENT)
Dept: PHYSICAL THERAPY | Age: 57
End: 2022-02-16
Payer: MEDICAID

## 2022-02-17 ENCOUNTER — HOSPITAL ENCOUNTER (OUTPATIENT)
Dept: PHYSICAL THERAPY | Age: 57
Discharge: HOME | End: 2022-02-17
Payer: MEDICAID

## 2022-02-17 PROCEDURE — 97112 NEUROMUSCULAR REEDUCATION: CPT

## 2022-02-17 PROCEDURE — 97016 VASOPNEUMATIC DEVICE THERAPY: CPT

## 2022-02-17 PROCEDURE — 97110 THERAPEUTIC EXERCISES: CPT

## 2022-02-17 NOTE — PROGRESS NOTES
PT DAILY TREATMENT NOTE    Patient Name: Rhianna Prater  Date:2022  : 1965  [x]  Patient  Verified  Payor: BLUE CROSS MEDICAID / Plan: [facility] Lucas Gudino / Product Type: Managed Care Medicaid /    In time:10:26 AM  Out time:11:09 AM  Total Treatment Time (min): 43  Total Timed Codes (min): 33  1:1 Treatment Time (MC/BCBS only): 33   Visit #: 14 of 25    Treatment Dx: Right foot pain [M79.671]    SUBJECTIVE  Pain Level (0-10 scale): 3  Any medication changes, allergies to medications, adverse drug reactions, diagnosis change, or new procedure performed?: [x] No    [] Yes (see summary sheet for update)  Subjective functional status/changes:   [] No changes reported  Patient reports no new complaints. \"The surgeon wants to go back in and fix it, but I told him 'no'. \"    OBJECTIVE    Modalities Rationale:     decrease edema, decrease inflammation and decrease pain to improve patient's ability to ease post session soreness   min [] Estim, type/location:                                      []  att     []  unatt     []  w/US     []  w/ice    []  w/heat    min []  Mechanical Traction: type/lbs                   []  pro   []  sup   []  int   []  cont    []  before manual    []  after manual    min []  Ultrasound, settings/location:      min []  Iontophoresis w/ dexamethasone, location:                                               []  take home patch       []  in clinic    min []  Ice     []  Heat    location/position:    10 min [x]  Vasopneumatic Device, press/temp:    If using vaso (only need to measure limb vaso being performed on)      pre-treatment girth : 55 cm      post-treatment girth : 55 cm      measured at (landmark location) :  Right ankle figure 8 measurement    min []  Other:    [] Skin assessment post-treatment (if applicable):    []  intact    []  redness- no adverse reaction                  []redness  adverse reaction:              23 min Therapeutic Exercise:  [x] See flow sheet :   Rationale: increase ROM, increase strength and improve coordination to improve the patients ability to increase ease with ADLs      10 min Neuromuscular Re-education:  [x]  See flow sheet :   Rationale: increase strength, improve coordination, improve balance and increase proprioception  to improve the patients ability to improve ankle mobility and improve ankle strategy to reduce risk of falls      With   [] TE   [] TA   [] neuro   [] other: Patient Education: [x] Review HEP    [] Progressed/Changed HEP based on:   [] positioning   [] body mechanics   [] transfers   [] heat/ice application    [] other:      Other Objective/Functional Measures:      Pain Level (0-10 scale) post treatment: 3    ASSESSMENT/Changes in Function:   Patient continues with antalgic gait at [facility] lacking both heel strike and toe off. Added DF towel slides to promote increased ROM void of adverse reaction. Continue per protocol. Patient will continue to benefit from skilled PT services to modify and progress therapeutic interventions, address functional mobility deficits, address ROM deficits, address strength deficits, analyze and address soft tissue restrictions, analyze and cue movement patterns, analyze and modify body mechanics/ergonomics, assess and modify postural abnormalities and instruct in home and community integration to attain remaining goals. [x]  See Plan of Care  []  See progress note/recertification  []  See Discharge Summary         Progress towards goals / Updated goals:  Short Term Goals: To be accomplished in 4 weeks:  1. Patient will be independent and compliant with HEP to progress toward goals and restore functional mobility. Eval Status: issued at [facility]               Current: Patient reports daily compliance with her HEP and denies having any questions about it.   12/29/21, met     2. Patient will improve FOTO score by 6 points to improve functional tolerance for exercise.    Eval Status: GKHX 20  CURRENT: FOTO 63 12/27/21 Goal Met  FOTO score = an established functional score where 100 = no disability     3. Patient will improve pain in right foot to 5/10  to improve activity tolerance and restore prior level of function. Eval Status: 9/10 at worst  Current: 4/10 pain at worst over the last week    12/29/21, met     4. Pt will have painfree right ankle and great toe AROM with at least 5 degree increase in all planes to aid in functional mechanics for ambulation/ADLs. Eval Status:                 AROM              PROM                          Left Right Left Right   Dorsiflexion 12 -10 -10     Plantarflexion 50 30 32     Inversion 15 NT per MD restrictions       Eversion 30 NT per MD restrictions       Great Toe Ext 30 25 NT     Great Toe Flex 10 10 NT      Current: same as above        Long Term Goals: To be accomplished in 8 weeks:  1. Patient will improve FOTO score by 12 points to improve functional tolerance for ambulation and ADLs. Eval Status: Felipa Granados                          [facility]: [facility]JNG 41 12/27/21 Goal Met  FOTO score = an established functional score where 100 = no disability     2. Pt will have painfree right ankle and great toe AROM with at least 15 degree improvement in all planes to aid in functional mechanics for ambulation/ADLs. Eval Status:                 AROM                          PROM                          Left Right Left Right   Dorsiflexion 12 -10 -10     Plantarflexion 50 30 32     Inversion 15 NT per MD restrictions       Eversion 30 NT per MD restrictions       Great Toe Ext 30 25 NT     Great Toe Flex 10 10 NT      current: PROGRESSING, added DF towel slides (2/17/22)       3. Pt will have 5/5 LE strength to return to goals of ambulation and performing ADLs.   Eval Status:         Strength (1-5)    Left  Right   Dorsiflexion 5 4   Plantarflexion 5 4   Inversion 5 NT per MD restrictions   Eversion 5 NT per MD restrictions   Great Toe Ext 5 3   Great Toe Flex 4+ +    Lower Extremity Left  (0-5) Right (0-5)   Hip Flexion (L1,2) 4+ 4+   Knee Extension (L3,4) 4+ 4   Knee Flexion (S1,2) 4 4   Hip IR 4+ 4   Hip ER 4+ 4   Hip Abduction 4+ 4   Hip Adduction 4+ 4+      Current: Continues to progress with ROM exercises at this time with HEP 1/31/22     4. Patient will ambulate 500ft mod I with LRAD demonstrating symmetrical gait pattern in order to facilitate her ability to ambulate in the community.   Eval Status: 15ft with FWW WBAT Right LE with right cam boot and gait belt donned with CGA   Current: Pt ambulating without CAM boot for over a week but, continues to utilize [facility] outside of home. 1/11/22     5. Patient will improve pain in right foot and ankle to 1-2/10 at worst to improve activity tolerance and restore prior level of function.              Eval Status: 9/10 at worst               Current: 5/10 at worst when going to bed.  2/2/22 In Progress         PLAN  []  Upgrade activities as tolerated     [x]  Continue plan of care  []  Update interventions per flow sheet       []  Discharge due to:_  []  Other:_      Shay Cortes 2/17/2022  10:29 AM    Future Appointments   Date Time Provider Noah Gregory   2/21/2022 11:45 AM [facility]   2/23/2022 11:00 AM [facility]

## 2022-02-18 ENCOUNTER — APPOINTMENT (OUTPATIENT)
Dept: PHYSICAL THERAPY | Age: 57
End: 2022-02-18
Payer: MEDICAID

## 2022-02-21 ENCOUNTER — HOSPITAL ENCOUNTER (OUTPATIENT)
Dept: PHYSICAL THERAPY | Age: 57
Discharge: HOME | End: 2022-02-21
Payer: MEDICAID

## 2022-02-21 PROCEDURE — 97112 NEUROMUSCULAR REEDUCATION: CPT

## 2022-02-21 PROCEDURE — 97016 VASOPNEUMATIC DEVICE THERAPY: CPT

## 2022-02-21 PROCEDURE — 97110 THERAPEUTIC EXERCISES: CPT

## 2022-02-21 NOTE — PROGRESS NOTES
PT DAILY TREATMENT NOTE    Patient Name: Teagan Reyez  Date:2022  : 1965  [x]  Patient  Verified  Payor: BLUE CROSS MEDICAID / Plan: [facility] Christiano Peel / Product Type: Managed Care Medicaid /    In time:11:54  Out time:12:44  Total Treatment Time (min): 50  Total Timed Codes (min): 40  1:1 Treatment Time (MC/BCBS only): 40   Visit #: 15 of 25    Treatment Dx: Right foot pain [M79.671]    SUBJECTIVE  Pain Level (0-10 scale): 3/10  Any medication changes, allergies to medications, adverse drug reactions, diagnosis change, or new procedure performed?: [x] No    [] Yes (see summary sheet for update)  Subjective functional status/changes:   [] No changes reported  \"I have some pain in my foot. I start wound care on my ankle this week. The wound on the side still hasn't closed up enough so the doctor wants to start wound care. \"    OBJECTIVE    Modalities Rationale:     decrease edema, decrease inflammation and decrease pain to improve patient's ability to return to prior level of physical activity. 10 min [x]  Vasopneumatic Device, press/temp: Med/Low   If using vaso (only need to measure limb vaso being performed on)      pre-treatment girth : 57 cm      post-treatment girth : 56.5 cm      measured at (landmark location) :  Mid-patella   [x] Skin assessment post-treatment (if applicable):    [x]  intact    []  redness- no adverse reaction                  []redness  adverse reaction:            25 min Therapeutic Exercise:  [x] See flow sheet :   Rationale: increase ROM, increase strength and improve coordination to improve the patients ability to return to prior level of physical activity. 15 min Neuromuscular Re-education:  [x]  See flow sheet :   Rationale: increase ROM, increase strength and improve coordination  to improve the patients ability to return to prior level of physical activity.              With   [] TE   [] TA   [] neuro   [] other: Patient Education: [x] Review HEP    [] Progressed/Changed HEP based on:   [] positioning   [] body mechanics   [] transfers   [] heat/ice application    [] other:      Other Objective/Functional Measures:      Pain Level (0-10 scale) post treatment: 0/10    ASSESSMENT/Changes in Function: Pt tolerated session well with no increased pain in their ankle. Pt reports beginning wound care later this week for heel cracking and lack healing around suture site. Pt reports very little pain with standing therex and all other general exercises, in affected ankle. Pt received VASO post tx for reduction of swelling and pain. Patient will continue to benefit from skilled PT services to modify and progress therapeutic interventions, address functional mobility deficits, address ROM deficits, address strength deficits, analyze and address soft tissue restrictions, analyze and cue movement patterns, analyze and modify body mechanics/ergonomics and assess and modify postural abnormalities to attain remaining goals. [x]  See Plan of Care  []  See progress note/recertification  []  See Discharge Summary         Progress towards goals / Updated goals:  Short Term Goals: To be accomplished in 4 weeks:  1. Patient will be independent and compliant with HEP to progress toward goals and restore functional mobility. Eval Status: issued at eval               Current: Patient reports daily compliance with her HEP and denies having any questions about it.   12/29/21, met     2. Patient will improve FOTO score by 6 points to improve functional tolerance for exercise. Eval Status: FOTO 49  CURRENT: FOTO 63 12/27/21 Goal Met  FOTO score = an established functional score where 100 = no disability     3. Patient will improve pain in right foot to 5/10  to improve activity tolerance and restore prior level of function. Eval Status: 9/10 at worst  Current: 4/10 pain at worst over the last week    12/29/21, met     4.  Pt will have painfree right ankle and great toe AROM with at least 5 degree increase in all planes to aid in functional mechanics for ambulation/ADLs. Eval Status:                 AROM              PROM                          Left Right Left Right   Dorsiflexion 12 -10 -10     Plantarflexion 50 30 32     Inversion 15 NT per MD restrictions       Eversion 30 NT per MD restrictions       Great Toe Ext 30 25 NT     Great Toe Flex 10 10 NT      Current: same as above        Long Term Goals: To be accomplished in 8 weeks:  1. Patient will improve FOTO score by 12 points to improve functional tolerance for ambulation and ADLs. Eval Status: Coleman Srinivasan                          IDBOWBM: TGCS 44 12/27/21 Goal Met  FOTO score = an established functional score where 100 = no disability     2. Pt will have painfree right ankle and great toe AROM with at least 15 degree improvement in all planes to aid in functional mechanics for ambulation/ADLs. Eval Status:                 AROM                          PROM                          Left Right Left Right   Dorsiflexion 12 -10 -10     Plantarflexion 50 30 32     Inversion 15 NT per MD restrictions       Eversion 30 NT per MD restrictions       Great Toe Ext 30 25 NT     Great Toe Flex 10 10 NT      current: PROGRESSING, added DF towel slides (2/17/22)       3. Pt will have 5/5 LE strength to return to goals of ambulation and performing ADLs. Eval Status:         Strength (1-5)    Left  Right   Dorsiflexion 5 4   Plantarflexion 5 4   Inversion 5 NT per MD restrictions   Eversion 5 NT per MD restrictions   Great Toe Ext 5 3   Great Toe Flex 4+ +      Lower Extremity Left  (0-5) Right (0-5)   Hip Flexion (L1,2) 4+ 4+   Knee Extension (L3,4) 4+ 4   Knee Flexion (S1,2) 4 4   Hip IR 4+ 4   Hip ER 4+ 4   Hip Abduction 4+ 4   Hip Adduction 4+ 4+      Current: Continues to progress with ROM exercises at this time with HEP 1/31/22     4.  Patient will ambulate 500ft mod I with LRAD demonstrating symmetrical gait pattern in order to facilitate her ability to ambulate in the community.   Eval Status: 15ft with FWW WBAT Right LE with right cam boot and gait belt donned with CGA   Current: Pt able to ambulate 100+ft with Supervision and Quad cane with no loss of balance 2/21/22     5. Patient will improve pain in right foot and ankle to 1-2/10 at worst to improve activity tolerance and restore prior level of function.              Eval Status: 9/10 at worst               Current: 5/10 at worst when going to bed.  2/2/22 In Progress        PLAN  [x]  Upgrade activities as tolerated     [x]  Continue plan of care  []  Update interventions per flow sheet       []  Discharge due to:_  []  Other:_      Marine Laguna PTA 2/21/2022  12:03 PM    Future Appointments   Date Time Provider Noah Gregory   2/23/2022 11:00 AM [facility]

## 2022-02-23 ENCOUNTER — HOSPITAL ENCOUNTER (OUTPATIENT)
Dept: PHYSICAL THERAPY | Age: 57
Discharge: HOME | End: 2022-02-23
Payer: MEDICAID

## 2022-02-23 PROCEDURE — 97535 SELF CARE MNGMENT TRAINING: CPT

## 2022-02-23 PROCEDURE — 97110 THERAPEUTIC EXERCISES: CPT

## 2022-02-23 NOTE — PROGRESS NOTES
PT DAILY TREATMENT NOTE    Patient Name: Heather Nicholson  Date:2022  : 1965  [x]  Patient  Verified  Payor: BLUE CROSS MEDICAID / Plan: [facility] Ronda Drop / Product Type: Managed Care Medicaid /    In time:11:10  Out time:11:40  Total Treatment Time (min): 30  Total Timed Codes (min): 30  1:1 Treatment Time (MC/BCBS only): 30   Visit #: 16 of 25    Treatment Dx: Right foot pain [M79.671]    SUBJECTIVE  Pain Level (0-10 scale): 210  Any medication changes, allergies to medications, adverse drug reactions, diagnosis change, or new procedure performed?: [x] No    [] Yes (see summary sheet for update)  Subjective functional status/changes:   [] No changes reported  \"I feel better. \"     OBJECTIVE        20 min Therapeutic Exercise:  [x] See flow sheet :   Rationale: increase ROM, increase strength and improve coordination to improve the patients ability to return to prior level of physical activity. 10 min Self Care: Reviewed HEP and continued progression of ROM   Rationale:    increase ROM, increase strength and improve coordination to improve the patients ability to return to prior level of physical activity. With   [] TE   [] TA   [] neuro   [] other: Patient Education: [x] Review HEP    [] Progressed/Changed HEP based on:   [] positioning   [] body mechanics   [] transfers   [] heat/ice application    [] other:      Other Objective/Functional Measures: updated goals      Pain Level (0-10 scale) post treatment: 1-2/10    ASSESSMENT/Changes in Function: Pt has made good progress since initial visit. Pt demonstrates improvement with general ankle ROM but, does continue to be greatly restricted with ER and IR. Pt also demonstrates improvements with strength in ankle but, pt continues to demonstrated weakness with ER/IR, secondary to lack of ROM.  Pt demonstrated good gait with Right ankle and SPC but, pt uses ankle on Right side due to pt's left Hip causing a greater detriment to their gait. Pt reports improved tolerance to general ambulation and weightbearing throughout foot. Pt will benefit from continued therapy to address deficits stated above. Patient will continue to benefit from skilled PT services to modify and progress therapeutic interventions, address functional mobility deficits, address ROM deficits, address strength deficits, analyze and address soft tissue restrictions, analyze and cue movement patterns, analyze and modify body mechanics/ergonomics and assess and modify postural abnormalities to attain remaining goals. [x]  See Plan of Care  []  See progress note/recertification  []  See Discharge Summary         Progress towards goals / Updated goals:  Short Term Goals: To be accomplished in 4 weeks:  1. Patient will be independent and compliant with HEP to progress toward goals and restore functional mobility. Eval Status: issued at eval               Current: Patient reports daily compliance with her HEP and denies having any questions about it.   12/29/21, met     2. Patient will improve FOTO score by 6 points to improve functional tolerance for exercise. Eval Status: FOTO 49  CURRENT: FOTO 63 12/27/21 Goal Met  FOTO score = an established functional score where 100 = no disability     3. Patient will improve pain in right foot to 5/10  to improve activity tolerance and restore prior level of function. Eval Status: 9/10 at worst  Current: 4/10 pain at worst over the last week    12/29/21, met     4. Pt will have painfree right ankle and great toe AROM with at least 5 degree increase in all planes to aid in functional mechanics for ambulation/ADLs.   Eval Status:                 AROM              PROM                          Left Right Left Right   Dorsiflexion 12 -10 -10     Plantarflexion 50 30 32     Inversion 15 NT per MD restrictions       Eversion 30 NT per MD restrictions       Great Toe Ext 30 25 NT     Great Toe Flex 10 10 NT      Current: see below 2/23/22    Right 2/23/22   Dorsiflexion -2   Plantarflexion 45   Inversion 4   Eversion 4   Great Toe Ext WFL   Great Toe Flex [facility]        Long Term Goals: To be accomplished in 8 weeks:  1. Patient will improve FOTO score by 12 points to improve functional tolerance for ambulation and ADLs. [facility] Goal Met  FOTO score = an established functional score where 100 = no disability     2. Pt will have painfree right ankle and great toe AROM with at least 15 degree improvement in all planes to aid in functional mechanics for ambulation/ADLs. Eval Status:                 AROM                          PROM                          Left Right Left Right   Dorsiflexion 12 -10 -10     Plantarflexion 50 30 32     Inversion 15 NT per MD restrictions       Eversion 30 NT per MD restrictions       Great Toe Ext 30 25 NT     Great Toe Flex 10 10 NT      current:see below 2/23/22  AROM  Right 2/23/22   Dorsiflexion -2   Plantarflexion 45   Inversion 4   Eversion 4   Great Toe Ext WFL   Great Toe Flex WFL          3. Pt will have 5/5 LE strength to return to goals of ambulation and performing ADLs.   Eval Status:         Strength (1-5)    Left  Right   Dorsiflexion 5 4   Plantarflexion 5 4   Inversion 5 NT per MD restrictions   Eversion 5 NT per MD restrictions   Great Toe Ext 5 3   Great Toe Flex 4+ +      Lower Extremity Left  (0-5) Right (0-5)   Hip Flexion (L1,2) 4+ 4+   Knee Extension (L3,4) 4+ 4   Knee Flexion (S1,2) 4 4   Hip IR 4+ 4   Hip ER 4+ 4   Hip Abduction 4+ 4   Hip Adduction 4+ 4+      Current: see below 2/23/22          Strength (1-5)   Right 2/23/22   Dorsiflexion 5   Plantarflexion 5   Inversion 3   Eversion 3   Great Toe Ext 5   Great Toe Flex 5      Lower Extremity Left 2/23/22   Right 2/23/22   Hip Flexion (L1,2) 4+ 5   Knee Extension (L3,4) 4+ 5   Knee Flexion (S1,2) 5 5   Hip IR 4+ 5   Hip ER 4+ 4+   Hip Abduction 4- 4-   Hip Adduction 4+ 4+          4. Patient will ambulate 500ft mod I with LRAD demonstrating symmetrical gait pattern in order to facilitate her ability to ambulate in the community.   Eval Status: 15ft with FWW WBAT Right LE with right cam boot and gait belt donned with CGA   Current: Pt amb 500 ft with SPC and Supervision, reciprocal gait pattern, good heel strike and even stride length 2/23/22     5. Patient will improve pain in right foot and ankle to 1-2/10 at worst to improve activity tolerance and restore prior level of function.              Eval Status: 9/10 at worst               Current: 2-3/10 at worst in the past week 2/23/22      PLAN  [x]  Upgrade activities as tolerated     [x]  Continue plan of care  []  Update interventions per flow sheet       []  Discharge due to:_  []  Other:_      Venice Stout, YASEMIN 2/23/2022  11:11 AM    No future appointments.

## 2022-02-23 NOTE — PROGRESS NOTES
[facility]  [address] 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 Patient will be independent and compliant with HEP to progress toward goals and restore functional mobility. Eval Status: issued at eval               Current: Patient reports daily compliance with her HEP and denies having any questions about it.   21, met     2. Patient will improve FOTO score by 6 points to improve functional tolerance for exercise. Eval Status: FOTO 49  CURRENT: FOTO 63 21 Goal Met  FOTO score = an established functional score where 100 = no disability     3. Patient will improve pain in right foot to 5/10  to improve activity tolerance and restore prior level of function. Eval Status: 9/10 at worst  Current: 4/10 pain at worst over the last week    21, met     4. Pt will have painfree right ankle and great toe AROM with at least 5 degree increase in all planes to aid in functional mechanics for ambulation/ADLs.   Eval Status:                 AROM              PROM                          Left Right Left Right   Dorsiflexion 12 -10 -10     Plantarflexion 50 30 32     Inversion 15 NT per MD restrictions       Eversion 30 NT per MD restrictions       Great Toe Ext 30 25 NT     Great Toe Flex 10 10 NT      Current: see below 2/23/22    Right 2/23/22   Dorsiflexion -2   Plantarflexion 45   Inversion 4   Eversion 4   Great Toe Ext WFL   Great Toe Flex [facility]         Long Term Goals: To be accomplished in 8 weeks:  1. Patient will improve FOTO score by 12 points to improve functional tolerance for ambulation and ADLs. Eval Status: [facility] 12/27/21 Goal Met  FOTO score = an established functional score where 100 = no disability     2. Pt will have painfree right ankle and great toe AROM with at least 15 degree improvement in all planes to aid in functional mechanics for ambulation/ADLs. Eval Status:                 AROM                          PROM                          Left Right Left Right   Dorsiflexion 12 -10 -10     Plantarflexion 50 30 32     Inversion 15 NT per MD restrictions       Eversion 30 NT per MD restrictions       Great Toe Ext 30 25 NT     Great Toe Flex 10 10 NT      current:see below 2/23/22  AROM  Right 2/23/22   Dorsiflexion -2   Plantarflexion 45   Inversion 4   Eversion 4   Great Toe Ext WFL   Great Toe Flex WFL           3. Pt will have 5/5 LE strength to return to goals of ambulation and performing ADLs.   Eval Status:         Strength (1-5)    Left  Right   Dorsiflexion 5 4   Plantarflexion 5 4   Inversion 5 NT per MD restrictions   Eversion 5 NT per MD restrictions   Great Toe Ext 5 3   Great Toe Flex 4+ +      Lower Extremity Left  (0-5) Right (0-5)   Hip Flexion (L1,2) 4+ 4+   Knee Extension (L3,4) 4+ 4   Knee Flexion (S1,2) 4 4   Hip IR 4+ 4   Hip ER 4+ 4   Hip Abduction 4+ 4   Hip Adduction 4+ 4+      Current: see below 2/23/22          Strength (1-5)    Right 2/23/22   Dorsiflexion 5   Plantarflexion 5   Inversion 3   Eversion 3   Great Toe Ext 5   Great Toe Flex 5      Lower Extremity Left 2/23/22    Right 2/23/22   Hip Flexion (L1,2) 4+ 5   Knee Extension (L3,4) 4+ 5   Knee Flexion (S1,2) 5 5   Hip IR 4+ 5   Hip ER 4+ 4+   Hip Abduction 4- 4-   Hip Adduction 4+ 4+            4. Patient will ambulate 500ft mod I with LRAD demonstrating symmetrical gait pattern in order to facilitate her ability to ambulate in the community.   Eval Status: 15ft with FWW WBAT Right LE with right cam boot and gait belt donned with CGA   Current: Pt amb 500 ft with SPC and Supervision, reciprocal gait pattern, good heel strike and even stride length 2/23/22     5. Patient will improve pain in right foot and ankle to 1-2/10 at worst to improve activity tolerance and restore prior level of function.              Eval Status: 9/10 at worst               Current: 2-3/10 at worst in the past week 2/23/22        Key Functional Changes: Pt has made good progress since initial visit. Pt demonstrates improvement with general ankle ROM but, does continue to be greatly restricted with ER and IR. Pt also demonstrates improvements with strength in ankle but, pt continues to demonstrated weakness with ER/IR, secondary to lack of ROM. Pt demonstrated good gait with Right ankle and SPC but, pt uses ankle on Right side due to pt's left Hip causing a greater detriment to their gait. Pt reports improved tolerance to general ambulation and weightbearing throughout foot.  Pt will benefit from continued therapy to address deficits stated above.       ASSESSMENT/RECOMMENDATIONS:  [x]Continue therapy per initial plan/protocol at a frequency of  2 x per week for 4 weeks  []Continue therapy with the following recommended changes:_____________________ _____________________________ ________________________________________  []Discontinue therapy progressing towards or have reached established goals  []Discontinue therapy due to lack of appreciable progress towards goals  []Discontinue therapy due to lack of attendance or compliance  []Await Physician's recommendations/decisions regarding therapy  []Other:________________________________________________________________    Thank you for this referral.   Lisa Douglass PTA 2/23/2022 12:27 PM

## 2022-03-02 ENCOUNTER — HOSPITAL ENCOUNTER (OUTPATIENT)
Dept: PHYSICAL THERAPY | Age: 57
Discharge: HOME | End: 2022-03-02
Payer: MEDICAID

## 2022-03-02 PROCEDURE — 97112 NEUROMUSCULAR REEDUCATION: CPT

## 2022-03-02 PROCEDURE — 97016 VASOPNEUMATIC DEVICE THERAPY: CPT

## 2022-03-02 PROCEDURE — 97530 THERAPEUTIC ACTIVITIES: CPT

## 2022-03-02 PROCEDURE — 97110 THERAPEUTIC EXERCISES: CPT

## 2022-03-02 NOTE — PROGRESS NOTES
PT DAILY TREATMENT NOTE    Patient Name: Chantel Neither  Date:3/2/2022  : 1965  [x]  Patient  Verified  Payor: BLUE CROSS MEDICAID / Plan: [facility] Amedeo Dilling / Product Type: Managed Care Medicaid /    In time:12:30  Out time:1:30  Total Treatment Time (min): 60  Total Timed Codes (min): 50  1:1 Treatment Time (MC/BCBS only): 50   Visit #: 17 of 25    Treatment Dx: Right foot pain [M79.671]    SUBJECTIVE  Pain Level (0-10 scale): 2/10 Ankle, (6/10 Left Hip)  Any medication changes, allergies to medications, adverse drug reactions, diagnosis change, or new procedure performed?: [x] No    [] Yes (see summary sheet for update)  Subjective functional status/changes:   [] No changes reported  \"I don't have any pain. \"     OBJECTIVE    Modalities Rationale:     decrease edema, decrease inflammation and decrease pain to improve patient's ability to return to prior level of physical activity. 10 min [x]  Vasopneumatic Device, press/temp: Med/Low   If using vaso (only need to measure limb vaso being performed on)      pre-treatment girth : 56 cm      post-treatment girth : 55.5 cm      measured at (landmark location) :  Figure 8   [x] Skin assessment post-treatment (if applicable):    [x]  intact    []  redness- no adverse reaction                  []redness  adverse reaction:            28 min Therapeutic Exercise:  [x] See flow sheet :   Rationale: increase ROM, increase strength and improve coordination to improve the patients ability to return to prior level of physical activity. 10 min Therapeutic Activity:  [x]  See flow sheet :   Rationale: increase ROM, increase strength and improve coordination  to improve the patients ability to return to prior level of physical activity. 12 min Neuromuscular Re-education:  [x]  See flow sheet :   Rationale: increase ROM, increase strength and improve coordination  to improve the patients ability to return to prior level of physical activity. With   [] TE   [] TA   [] neuro   [] other: Patient Education: [x] Review HEP    [] Progressed/Changed HEP based on:   [] positioning   [] body mechanics   [] transfers   [] heat/ice application    [] other:      Other Objective/Functional Measures:      Pain Level (0-10 scale) post treatment: 0/10    ASSESSMENT/Changes in Function: Pt tolerated session well. Pt continues to demonstrate difficulty with general ankle ROM but, no increased pain from performance. Pt reports more pain and discomfort in Angel knees and Left Hip during ex. Pt continues to be challenged by general standing therex due to swelling causing decreased ROM with heel raises. Pt received VASO post tx for reduction of pain and swelling. Patient will continue to benefit from skilled PT services to modify and progress therapeutic interventions, address functional mobility deficits, address ROM deficits, address strength deficits, analyze and address soft tissue restrictions, analyze and cue movement patterns, analyze and modify body mechanics/ergonomics and assess and modify postural abnormalities to attain remaining goals. [x]  See Plan of Care  []  See progress note/recertification  []  See Discharge Summary         Progress towards goals / Updated goals:  Short Term Goals: To be accomplished in 4 weeks:  1. Patient will be independent and compliant with HEP to progress toward goals and restore functional mobility. Eval Status: issued at eval               Last PN: Patient reports daily compliance with her HEP and denies having any questions about it.   12/29/21, met     2. Patient will improve FOTO score by 6 points to improve functional tolerance for exercise. Eval Status: FOTO 49  Last PN: FOTO 63 12/27/21 Goal Met  FOTO score = an established functional score where 100 = no disability     3. Patient will improve pain in right foot to 5/10  to improve activity tolerance and restore prior level of function.   Eval Status: 9/10 at worst  Current: 4/10 pain at worst over the last week    12/29/21, met     4. Pt will have painfree right ankle and great toe AROM with at least 5 degree increase in all planes to aid in functional mechanics for ambulation/ADLs. Eval Status:                 AROM              PROM                          Left Right Left Right   Dorsiflexion 12 -10 -10     Plantarflexion 50 30 32     Inversion 15 NT per MD restrictions       Eversion 30 NT per MD restrictions       Great Toe Ext 30 25 NT     Great Toe Flex 10 10 NT     Last PN: see below 2/23/22    Right 2/23/22   Dorsiflexion -2   Plantarflexion 45   Inversion 4   Eversion 4   Great Toe Ext WFL   Great Toe Flex WFL    Current: NA       Long Term Goals: To be accomplished in 8 weeks:  1. Patient will improve FOTO score by 12 points to improve functional tolerance for ambulation and ADLs. Eval Status: Jacqueline Vuong                          Last PN: FOTO 63 12/27/21 Goal Met  FOTO score = an established functional score where 100 = no disability     2. Pt will have painfree right ankle and great toe AROM with at least 15 degree improvement in all planes to aid in functional mechanics for ambulation/ADLs. Eval Status:                 AROM                          PROM                          Left Right Left Right   Dorsiflexion 12 -10 -10     Plantarflexion 50 30 32     Inversion 15 NT per MD restrictions       Eversion 30 NT per MD restrictions       Great Toe Ext 30 25 NT     Great Toe Flex 10 10 NT     Last PN:see below 2/23/22  AROM  Right 2/23/22   Dorsiflexion -2   Plantarflexion 45   Inversion 4   Eversion 4   Great Toe Ext WFL   Great Toe Flex WFL    Current: NA       3. Pt will have 5/5 LE strength to return to goals of ambulation and performing ADLs.   Eval Status:         Strength (1-5)    Left  Right   Dorsiflexion 5 4   Plantarflexion 5 4   Inversion 5 NT per MD restrictions   Eversion 5 NT per MD restrictions   Great Toe Ext 5 3   Great Toe Flex 4+ +    Lower Extremity Left  (0-5) Right (0-5)   Hip Flexion (L1,2) 4+ 4+   Knee Extension (L3,4) 4+ 4   Knee Flexion (S1,2) 4 4   Hip IR 4+ 4   Hip ER 4+ 4   Hip Abduction 4+ 4   Hip Adduction 4+ 4+      Last PN: see below 2/23/22          Strength (1-5)    Right 2/23/22   Dorsiflexion 5   Plantarflexion 5   Inversion 3   Eversion 3   Great Toe Ext 5   Great Toe Flex 5      Lower Extremity Left 2/23/22    Right 2/23/22   Hip Flexion (L1,2) 4+ 5   Knee Extension (L3,4) 4+ 5   Knee Flexion (S1,2) 5 5   Hip IR 4+ 5   Hip ER 4+ 4+   Hip Abduction 4- 4-   Hip Adduction 4+ 4+    Current: NA        4.  Patient will ambulate 500ft mod I with LRAD demonstrating symmetrical gait pattern in order to facilitate her ability to ambulate in the community.   Eval Status: 15ft with FWW WBAT Right LE with right cam boot and gait belt donned with CGA   Last PNt: Pt amb 500 ft with SPC and Supervision, reciprocal gait pattern, good heel strike and even stride length 2/23/22  Current: NA     5. Patient will improve pain in right foot and ankle to 1-2/10 at worst to improve activity tolerance and restore prior level of function.              Eval Status: 9/10 at worst                Last PN: 2-3/10 at worst in the past week 2/23/22     Current: 2-3/10 at worst in the past week in ankle 3/2/22           PLAN  [x]  Upgrade activities as tolerated     [x]  Continue plan of care  []  Update interventions per flow sheet       []  Discharge due to:_  []  Other:_      Marylee Buckle, YASEMIN 3/2/2022  12:37 PM    Future Appointments   Date Time Provider Noah Gregory   3/4/2022 12:30 PM [facility]   3/7/2022 12:30 PM [facility]   3/9/2022 12:30 PM [facility]   [facility]   3/16/2022 12:30 PM [facility]   3/21/2022 12:30 PM All [place] PT [facility]   3/23/2022  2:00 PM [facility]   3/28/2022 12:30 PM Inga Rajput [facility]   3/30/2022 12:30 PM [facility]   4/4/2022 12:30 PM [facility]   4/6/2022 12:30 PM Blayne Espinal PT [facility]

## 2022-03-04 ENCOUNTER — HOSPITAL ENCOUNTER (OUTPATIENT)
Dept: PHYSICAL THERAPY | Age: 57
Discharge: HOME | End: 2022-03-04
Payer: MEDICAID

## 2022-03-04 PROCEDURE — 97112 NEUROMUSCULAR REEDUCATION: CPT

## 2022-03-04 PROCEDURE — 97016 VASOPNEUMATIC DEVICE THERAPY: CPT

## 2022-03-04 PROCEDURE — 97110 THERAPEUTIC EXERCISES: CPT

## 2022-03-04 PROCEDURE — 97530 THERAPEUTIC ACTIVITIES: CPT

## 2022-03-04 NOTE — PROGRESS NOTES
PT DAILY TREATMENT NOTE    Patient Name: Norma Maldonado  Date:3/4/2022  : 1965  [x]  Patient  Verified  Payor: BLUE CROSS MEDICAID / Plan: [facility] Christiano Bo / Product Type: Managed Care Medicaid /    In time:12:30  Out time:11:30  Total Treatment Time (min): 60  Total Timed Codes (min): 50  1:1 Treatment Time (MC/BCBS only): 45   Visit #: 18 of 25    Treatment Dx: Right foot pain [M79.671]    SUBJECTIVE  Pain Level (0-10 scale): 2/10  Any medication changes, allergies to medications, adverse drug reactions, diagnosis change, or new procedure performed?: [x] No    [] Yes (see summary sheet for update)  Subjective functional status/changes:   [] No changes reported  \"I don't have any pain right now really, maybe a little. \"    OBJECTIVE    Modalities Rationale:     decrease edema, decrease inflammation and decrease pain to improve patient's ability to return to prior level of physical activity. 10 min [x]  Vasopneumatic Device, press/temp: Med/Low   If using vaso (only need to measure limb vaso being performed on)      pre-treatment girth : 54 cm      post-treatment girth : 53.5 cm      measured at (landmark location) : Figure 8   [x] Skin assessment post-treatment (if applicable):    [x]  intact    []  redness- no adverse reaction                  []redness  adverse reaction:            28 min Therapeutic Exercise:  [x] See flow sheet :   Rationale: increase ROM, increase strength and improve coordination to improve the patients ability to return to prior level of physical activity. 12 min Therapeutic Activity:  [x]  See flow sheet :   Rationale: increase ROM, increase strength and improve coordination  to improve the patients ability to return to prior level of physical activity.       10 min Neuromuscular Re-education:  [x]  See flow sheet :   Rationale: increase ROM, increase strength and improve coordination  to improve the patients ability to return to prior level of physical activity. With   [] TE   [] TA   [] neuro   [] other: Patient Education: [x] Review HEP    [] Progressed/Changed HEP based on:   [] positioning   [] body mechanics   [] transfers   [] heat/ice application    [] other:      Other Objective/Functional Measures:      Pain Level (0-10 scale) post treatment:  0/10    ASSESSMENT/Changes in Function: Pt arrived ambulating with SPC, reporting very little pain this visit. Pt reports lack of compliance with HEP in past several days. Pt progressed to [facility] performance with reports of mild increased discomfort but, not above tolerance. Pt received VASO post tx for reduction of pain and swelling. Patient will continue to benefit from skilled PT services to modify and progress therapeutic interventions, address functional mobility deficits, address ROM deficits, address strength deficits, analyze and address soft tissue restrictions, analyze and cue movement patterns, analyze and modify body mechanics/ergonomics and assess and modify postural abnormalities to attain remaining goals. [x]  See Plan of Care  []  See progress note/recertification  []  See Discharge Summary         Progress towards goals / Updated goals:  Short Term Goals: To be accomplished in 4 weeks:  1. Patient will be independent and compliant with HEP to progress toward goals and restore functional mobility. Eval Status: issued at eval               Last PN: Patient reports daily compliance with her HEP and denies having any questions about it.   12/29/21, met     2. Patient will improve FOTO score by 6 points to improve functional tolerance for exercise. Eval Status: FOTO 49  Last PN: FOTO 63 12/27/21 Goal Met  FOTO score = an established functional score where 100 = no disability     3. Patient will improve pain in right foot to 5/10  to improve activity tolerance and restore prior level of function.   Eval Status: 9/10 at worst  Current: 4/10 pain at worst over the last week    12/29/21, met     4. Pt will have painfree right ankle and great toe AROM with at least 5 degree increase in all planes to aid in functional mechanics for ambulation/ADLs. Eval Status:                 AROM              PROM                          Left Right Left Right   Dorsiflexion 12 -10 -10     Plantarflexion 50 30 32     Inversion 15 NT per MD restrictions       Eversion 30 NT per MD restrictions       Great Toe Ext 30 25 NT     Great Toe Flex 10 10 NT     Last PN: see below 2/23/22    Right 2/23/22   Dorsiflexion -2   Plantarflexion 45   Inversion 4   Eversion 4   Great Toe Ext WFL   Great Toe Flex WFL    Current: NA        Long Term Goals: To be accomplished in 8 weeks:  1. Patient will improve FOTO score by 12 points to improve functional tolerance for ambulation and ADLs. Eval Status: Marquise Beltran                          Last PN: FOTO 63 12/27/21 Goal Met  FOTO score = an established functional score where 100 = no disability     2. Pt will have painfree right ankle and great toe AROM with at least 15 degree improvement in all planes to aid in functional mechanics for ambulation/ADLs. Eval Status:                 AROM                          PROM                          Left Right Left Right   Dorsiflexion 12 -10 -10     Plantarflexion 50 30 32     Inversion 15 NT per MD restrictions       Eversion 30 NT per MD restrictions       Great Toe Ext 30 25 NT     Great Toe Flex 10 10 NT     Last PN:see below 2/23/22  AROM  Right 2/23/22   Dorsiflexion -2   Plantarflexion 45   Inversion 4   Eversion 4   Great Toe Ext WFL   Great Toe Flex WFL    Current: NA       3. Pt will have 5/5 LE strength to return to goals of ambulation and performing ADLs.   Eval Status:         Strength (1-5)    Left  Right   Dorsiflexion 5 4   Plantarflexion 5 4   Inversion 5 NT per MD restrictions   Eversion 5 NT per MD restrictions   Great Toe Ext 5 3   Great Toe Flex 4+ +      Lower Extremity Left  (0-5) Right (0-5)   Hip Flexion (L1,2) 4+ 4+ Knee Extension (L3,4) 4+ 4   Knee Flexion (S1,2) 4 4   Hip IR 4+ 4   Hip ER 4+ 4   Hip Abduction 4+ 4   Hip Adduction 4+ 4+      Last PN: see below 2/23/22          Strength (1-5)    Right 2/23/22   Dorsiflexion 5   Plantarflexion 5   Inversion 3   Eversion 3   Great Toe Ext 5   Great Toe Flex 5      Lower Extremity Left 2/23/22    Right 2/23/22   Hip Flexion (L1,2) 4+ 5   Knee Extension (L3,4) 4+ 5   Knee Flexion (S1,2) 5 5   Hip IR 4+ 5   Hip ER 4+ 4+   Hip Abduction 4- 4-   Hip Adduction 4+ 4+    Current: NA        4.  Patient will ambulate 500ft mod I with LRAD demonstrating symmetrical gait pattern in order to facilitate her ability to ambulate in the community.   Eval Status: 15ft with FWW WBAT Right LE with right cam boot and gait belt donned with CGA   Last PNt: Pt amb 500 ft with SPC and Supervision, reciprocal gait pattern, good heel strike and even stride length 2/23/22  Current: Pt continues to utilize [facility] with reciprocal gait pattern and good heel strike 3/4/22     5. Patient will improve pain in right foot and ankle to 1-2/10 at worst to improve activity tolerance and restore prior level of function.              Eval Status: 9/10 at worst                           Last PN: 2-3/10 at worst in the past week 2/23/22                          Current: 2-3/10 at worst in the past week in ankle 3/2/22             PLAN  [x]  Upgrade activities as tolerated     [x]  Continue plan of care  []  Update interventions per flow sheet       []  Discharge due to:_  []  Other:_      Karla Ngo, PTA 3/4/2022  12:48 PM    Future Appointments   Date Time Provider Noah Gregory   3/7/2022 12:30 PM [facility]   3/9/2022 12:30 PM [facility]   3/14/2022 11:00 AM [facility]   3/16/2022 12:30 PM [facility]   3/21/2022 12:30 PM [facility]   3/23/2022  2:00 PM [facility]   3/28/2022 12:30 PM Lori Rodriguez [facility]   3/30/2022 12:30 PM [facility]   4/4/2022 12:30 PM [facility]   4/6/2022 12:30 PM Kay Martel PT [facility]

## 2022-03-07 ENCOUNTER — HOSPITAL ENCOUNTER (OUTPATIENT)
Dept: PHYSICAL THERAPY | Age: 57
Discharge: HOME | End: 2022-03-07
Payer: MEDICAID

## 2022-03-07 PROCEDURE — 97110 THERAPEUTIC EXERCISES: CPT

## 2022-03-07 PROCEDURE — 97016 VASOPNEUMATIC DEVICE THERAPY: CPT

## 2022-03-07 PROCEDURE — 97530 THERAPEUTIC ACTIVITIES: CPT

## 2022-03-07 PROCEDURE — 97112 NEUROMUSCULAR REEDUCATION: CPT

## 2022-03-07 NOTE — PROGRESS NOTES
PT DAILY TREATMENT NOTE    Patient Name: Romero Ahumada  Date:3/7/2022  : 1965  [x]  Patient  Verified  Payor: BLUE CROSS MEDICAID / Plan: [facility] Sixto Walkerroz / Product Type: Managed Care Medicaid /    In time:12:35  Out time:1:50  Total Treatment Time (min): 75  Total Timed Codes (min): 65  1:1 Treatment Time (MC/BCBS only): 65   Visit #: 19 of 25    Treatment Dx: Right foot pain [M79.671]    SUBJECTIVE  Pain Level (0-10 scale): 4/10  Any medication changes, allergies to medications, adverse drug reactions, diagnosis change, or new procedure performed?: [x] No    [] Yes (see summary sheet for update)  Subjective functional status/changes:   [] No changes reported  \"I have pain today but, I did go for a long walk over the weekend. \"    OBJECTIVE    Modalities Rationale:     decrease edema, decrease inflammation and decrease pain to improve patient's ability to return to prior level of physical activity. 10 min [x]  Vasopneumatic Device, press/temp: Med/Low   If using vaso (only need to measure limb vaso being performed on)      pre-treatment girth : 56 cm      post-treatment girth : 54 cm      measured at (landmark location) :  Figure 8 of ankle   [x] Skin assessment post-treatment (if applicable):    [x]  intact    []  redness- no adverse reaction                  []redness  adverse reaction:          40 min Therapeutic Exercise:  [x] See flow sheet :   Rationale: increase ROM, increase strength and improve coordination to improve the patients ability to return to prior level of physical activity. 15 min Therapeutic Activity:  [x]  See flow sheet :   Rationale: increase ROM, increase strength and improve coordination  to improve the patients ability to return to prior level of physical activity.       10 min Neuromuscular Re-education:  [x]  See flow sheet :   Rationale: increase ROM, increase strength and improve coordination  to improve the patients ability to return to prior level of physical activity. With   [] TE   [] TA   [] neuro   [] other: Patient Education: [x] Review HEP    [] Progressed/Changed HEP based on:   [] positioning   [] body mechanics   [] transfers   [] heat/ice application    [] other:      Other Objective/Functional Measures:     Pain Level (0-10 scale) post treatment: 4/10    ASSESSMENT/Changes in Function: Pt arrived ambulating with SPC and continued mild antalgic gait. Pt reported mild pain in ankle but, did report having performing long period of ambulation at park over the weekend. Pt reported increased pain and swelling after outing but, decreased after ice. Pt continues to be challenged by therex with no increased pain above initial levels. Pt received VASO post tx for reduction of swelling and pain. Patient will continue to benefit from skilled PT services to modify and progress therapeutic interventions, address functional mobility deficits, address ROM deficits, address strength deficits, analyze and address soft tissue restrictions, analyze and cue movement patterns, analyze and modify body mechanics/ergonomics and assess and modify postural abnormalities to attain remaining goals. [x]  See Plan of Care  []  See progress note/recertification  []  See Discharge Summary         Progress towards goals / Updated goals:  Short Term Goals: To be accomplished in 4 weeks:  1. Patient will be independent and compliant with HEP to progress toward goals and restore functional mobility. Eval Status: issued at eval               Last PN: Patient reports daily compliance with her HEP and denies having any questions about it.   12/29/21, met     2. Patient will improve FOTO score by 6 points to improve functional tolerance for exercise. Eval Status: FOTO 49  Last PN: FOTO 63 12/27/21 Goal Met  FOTO score = an established functional score where 100 = no disability     3.  Patient will improve pain in right foot to 5/10  to improve activity tolerance and restore prior level of function. Eval Status: 9/10 at worst  Current: 4/10 pain at worst over the last week    12/29/21, met     4. Pt will have painfree right ankle and great toe AROM with at least 5 degree increase in all planes to aid in functional mechanics for ambulation/ADLs. Eval Status:                 AROM              PROM                          Left Right Left Right   Dorsiflexion 12 -10 -10     Plantarflexion 50 30 32     Inversion 15 NT per MD restrictions       Eversion 30 NT per MD restrictions       Great Toe Ext 30 25 NT     Great Toe Flex 10 10 NT     Last PN: see below 2/23/22    Right 2/23/22   Dorsiflexion -2   Plantarflexion 45   Inversion 4   Eversion 4   Great Toe Ext WFL   Great Toe Flex WFL    Current: Pt continues to progress with ROM therex 3/7/22        Long Term Goals: To be accomplished in 8 weeks:  1. Patient will improve FOTO score by 12 points to improve functional tolerance for ambulation and ADLs. Eval Status: Deidra Hawley                          Last PN: FOTO 63 12/27/21 Goal Met  FOTO score = an established functional score where 100 = no disability     2. Pt will have painfree right ankle and great toe AROM with at least 15 degree improvement in all planes to aid in functional mechanics for ambulation/ADLs. Eval Status:                 AROM                          PROM                          Left Right Left Right   Dorsiflexion 12 -10 -10     Plantarflexion 50 30 32     Inversion 15 NT per MD restrictions       Eversion 30 NT per MD restrictions       Great Toe Ext 30 25 NT     Great Toe Flex 10 10 NT     Last PN:see below 2/23/22  AROM  Right 2/23/22   Dorsiflexion -2   Plantarflexion 45   Inversion 4   Eversion 4   Great Toe Ext WFL   Great Toe Flex WFL    Current: NA       3. Pt will have 5/5 LE strength to return to goals of ambulation and performing ADLs.   Eval Status:         Strength (1-5)    Left  Right   Dorsiflexion 5 4   Plantarflexion 5 4 Inversion 5 NT per MD restrictions   Eversion 5 NT per MD restrictions   Great Toe Ext 5 3   Great Toe Flex 4+ +      Lower Extremity Left  (0-5) Right (0-5)   Hip Flexion (L1,2) 4+ 4+   Knee Extension (L3,4) 4+ 4   Knee Flexion (S1,2) 4 4   Hip IR 4+ 4   Hip ER 4+ 4   Hip Abduction 4+ 4   Hip Adduction 4+ 4+      Last PN: see below 2/23/22          Strength (1-5)    Right 2/23/22   Dorsiflexion 5   Plantarflexion 5   Inversion 3   Eversion 3   Great Toe Ext 5   Great Toe Flex 5      Lower Extremity Left 2/23/22    Right 2/23/22   Hip Flexion (L1,2) 4+ 5   Knee Extension (L3,4) 4+ 5   Knee Flexion (S1,2) 5 5   Hip IR 4+ 5   Hip ER 4+ 4+   Hip Abduction 4- 4-   Hip Adduction 4+ 4+    Current: NA        4.  Patient will ambulate 500ft mod I with LRAD demonstrating symmetrical gait pattern in order to facilitate her ability to ambulate in the community.   Eval Status: 15ft with FWW WBAT Right LE with right cam boot and gait belt donned with CGA   Last PNt: Pt amb 500 ft with SPC and Supervision, reciprocal gait pattern, good heel strike and even stride length 2/23/22  Current: Pt continues to utilize [facility] with reciprocal gait pattern and good heel strike 3/4/22     5. Patient will improve pain in right foot and ankle to 1-2/10 at worst to improve activity tolerance and restore prior level of function.              Eval Status: 9/10 at worst                           Last PN: 2-3/10 at worst in the past week 2/23/22                          Current: 2-3/10 at worst in the past week in ankle 3/2/22        PLAN  [x]  Upgrade activities as tolerated     [x]  Continue plan of care  []  Update interventions per flow sheet       []  Discharge due to:_  []  Other:_      Nakia Fajardo, YASEMIN 3/7/2022  1:01 PM    Future Appointments   Date Time Provider Noah Gregory   3/9/2022 12:30 PM [facility]   3/14/2022 11:00 AM [facility]   3/16/2022 12:30 PM Char Cyr [facility] 3/21/2022 12:30 PM dEa Bruno PTA [facility]   3/23/2022  2:00 PM Guevara [facility]   3/28/2022 12:30 PM Easton Perez [facility]   3/30/2022 12:30 PM Guevara [facility]   4/4/2022 12:30 PM Guevara [facility]   4/6/2022 12:30 PM Ana Hoffman PT [facility]

## 2022-03-09 ENCOUNTER — TELEPHONE (OUTPATIENT)
Dept: PHYSICAL THERAPY | Age: 57
End: 2022-03-09

## 2022-03-09 ENCOUNTER — APPOINTMENT (OUTPATIENT)
Dept: PHYSICAL THERAPY | Age: 57
End: 2022-03-09
Payer: MEDICAID

## 2022-03-14 ENCOUNTER — HOSPITAL ENCOUNTER (OUTPATIENT)
Dept: PHYSICAL THERAPY | Age: 57
Discharge: HOME | End: 2022-03-14
Payer: MEDICAID

## 2022-03-14 PROCEDURE — 97535 SELF CARE MNGMENT TRAINING: CPT

## 2022-03-14 PROCEDURE — 97530 THERAPEUTIC ACTIVITIES: CPT

## 2022-03-14 PROCEDURE — 97112 NEUROMUSCULAR REEDUCATION: CPT

## 2022-03-14 PROCEDURE — 97110 THERAPEUTIC EXERCISES: CPT

## 2022-03-14 NOTE — PROGRESS NOTES
PT DAILY TREATMENT NOTE    Patient Name: Ramila Jennings  Date:3/14/2022  : 1965  [x]  Patient  Verified  Payor: BLUE CROSS MEDICAID / Plan: [facility] Tian Face / Product Type: Managed Care Medicaid /    In time:11:03  Out time:12:06  Total Treatment Time (min): 63  Total Timed Codes (min): 63  1:1 Treatment Time (MC/BCBS only): 63   Visit #: 20 of 25    Treatment Dx: Right foot pain [M79.671]    SUBJECTIVE  Pain Level (0-10 scale): 2/10  Any medication changes, allergies to medications, adverse drug reactions, diagnosis change, or new procedure performed?: [x] No    [] Yes (see summary sheet for update)  Subjective functional status/changes:   [] No changes reported  \"My ankle feels ok but, my knee is hurting pretty good today. \"    OBJECTIVE      33 min Therapeutic Exercise:  [x] See flow sheet :   Rationale: increase ROM, increase strength and improve coordination to improve the patients ability to return to prior level of physical activity. 12 min Therapeutic Activity:  [x]  See flow sheet :   Rationale: increase ROM, increase strength and improve coordination  to improve the patients ability to return to prior level of physical activity. 10 min Neuromuscular Re-education:  [x]  See flow sheet :   Rationale: increase ROM, increase strength and improve coordination  to improve the patients ability to return to prior level of physical activity. 8 min Self Care: Instructed in self gentle ER/IR stretching for HEP to improve ROM and ankle mobility due to doctors release our of ankle brace   Rationale:    increase ROM, increase strength and improve coordination to improve the patients ability to return to prior level of physical activity.            With   [] TE   [] TA   [] neuro   [] other: Patient Education: [x] Review HEP    [] Progressed/Changed HEP based on:   [] positioning   [] body mechanics   [] transfers   [] heat/ice application    [] other:      Other Objective/Functional Measures:     Pain Level (0-10 scale) post treatment: 2/10    ASSESSMENT/Changes in Function: Pt arrived ambulating with SPC with mild antalgic gait when weightbearing throughout left LE. Pt reports increased pain in Left knee. Pt reports their PCP is sending her to a surgeon due to some apparent positional abnormality in the knee. Pt reported they are still cleared to continued with ankle ROM ex within tolerance in seated positions. Pt also continues to wean out of ankle brace per doctor per pt tolerance. Pt continues to be greatly limited with ER/IR ROM but, reports no increased pain with Active movement or weightbearing. Pt reported increased fatigue in ankle but, not above tolerance. Patient will continue to benefit from skilled PT services to modify and progress therapeutic interventions, address functional mobility deficits, address ROM deficits, address strength deficits, analyze and address soft tissue restrictions, analyze and cue movement patterns, analyze and modify body mechanics/ergonomics and assess and modify postural abnormalities to attain remaining goals. [x]  See Plan of Care  []  See progress note/recertification  []  See Discharge Summary         Progress towards goals / Updated goals:  Short Term Goals: To be accomplished in 4 weeks:  1. Patient will be independent and compliant with HEP to progress toward goals and restore functional mobility. Eval Status: issued at eval               Last PN: Patient reports daily compliance with her HEP and denies having any questions about it.   12/29/21, met     2. Patient will improve FOTO score by 6 points to improve functional tolerance for exercise. Eval Status: FOTO 49  Last PN: FOTO 63 12/27/21 Goal Met  FOTO score = an established functional score where 100 = no disability     3. Patient will improve pain in right foot to 5/10  to improve activity tolerance and restore prior level of function.   Eval Status: 9/10 at worst  Current: 4/10 pain at worst over the last week    12/29/21, met     4. Pt will have painfree right ankle and great toe AROM with at least 5 degree increase in all planes to aid in functional mechanics for ambulation/ADLs. Eval Status:                 AROM              PROM                          Left Right Left Right   Dorsiflexion 12 -10 -10     Plantarflexion 50 30 32     Inversion 15 NT per MD restrictions       Eversion 30 NT per MD restrictions       Great Toe Ext 30 25 NT     Great Toe Flex 10 10 NT     Last PN: see below 2/23/22    Right 2/23/22   Dorsiflexion -2   Plantarflexion 45   Inversion 4   Eversion 4   Great Toe Ext WFL   Great Toe Flex WFL    Current: Pt continues to progress with ROM therex 3/7/22        Long Term Goals: To be accomplished in 8 weeks:  1. Patient will improve FOTO score by 12 points to improve functional tolerance for ambulation and ADLs. Eval Status: Marcin Norwoodar                          Last PN: FOTO 63 12/27/21 Goal Met  FOTO score = an established functional score where 100 = no disability     2. Pt will have painfree right ankle and great toe AROM with at least 15 degree improvement in all planes to aid in functional mechanics for ambulation/ADLs. Eval Status:                 AROM                          PROM                          Left Right Left Right   Dorsiflexion 12 -10 -10     Plantarflexion 50 30 32     Inversion 15 NT per MD restrictions       Eversion 30 NT per MD restrictions       Great Toe Ext 30 25 NT     Great Toe Flex 10 10 NT     Last PN:see below 2/23/22  AROM  Right 2/23/22   Dorsiflexion -2   Plantarflexion 45   Inversion 4   Eversion 4   Great Toe Ext WFL   Great Toe Flex WFL    Current: NA       3. Pt will have 5/5 LE strength to return to goals of ambulation and performing ADLs.   Eval Status:         Strength (1-5)    Left  Right   Dorsiflexion 5 4   Plantarflexion 5 4   Inversion 5 NT per MD restrictions   Eversion 5 NT per MD restrictions Great Toe Ext 5 3   Great Toe Flex 4+ +      Lower Extremity Left  (0-5) Right (0-5)   Hip Flexion (L1,2) 4+ 4+   Knee Extension (L3,4) 4+ 4   Knee Flexion (S1,2) 4 4   Hip IR 4+ 4   Hip ER 4+ 4   Hip Abduction 4+ 4   Hip Adduction 4+ 4+      Last PN: see below 2/23/22          Strength (1-5)    Right 2/23/22   Dorsiflexion 5   Plantarflexion 5   Inversion 3   Eversion 3   Great Toe Ext 5   Great Toe Flex 5      Lower Extremity Left 2/23/22    Right 2/23/22   Hip Flexion (L1,2) 4+ 5   Knee Extension (L3,4) 4+ 5   Knee Flexion (S1,2) 5 5   Hip IR 4+ 5   Hip ER 4+ 4+   Hip Abduction 4- 4-   Hip Adduction 4+ 4+    Current: NA        4.  Patient will ambulate 500ft mod I with LRAD demonstrating symmetrical gait pattern in order to facilitate her ability to ambulate in the community.   Eval Status: 15ft with FWW WBAT Right LE with right cam boot and gait belt donned with CGA   Last PNt: Pt amb 500 ft with SPC and Supervision, reciprocal gait pattern, good heel strike and even stride length 2/23/22  Current: Pt continues to utilize [address] with reciprocal gait pattern and good heel strike 3/4/22     5. Patient will improve pain in right foot and ankle to 1-2/10 at worst to improve activity tolerance and restore prior level of function.              Eval Status: 9/10 at worst                           Last PN: 2-3/10 at worst in the past week 2/23/22                          Current: 2/10 at worst in the past week 3/14/22            PLAN  [x]  Upgrade activities as tolerated     [x]  Continue plan of care  []  Update interventions per flow sheet       []  Discharge due to:_  []  Other:_      Enrico Tran, YASEMIN 3/14/2022  11:24 AM    Future Appointments   Date Time Provider Noah Gregory   3/16/2022 12:30 PM [facility]   3/21/2022 12:30 PM [facility]   3/23/2022  2:00 PM [facility]   3/28/2022 12:30 PM Ed [facility]   3/30/2022 12:30 PM Aleksandra Orantes [address] [facility]   4/4/2022 12:30 PM Aleksandra Orantes [address] [facility]   4/6/2022 12:30 PM Eligio Nye PT [address] [facility]

## 2022-03-17 ENCOUNTER — HOSPITAL ENCOUNTER (OUTPATIENT)
Dept: PHYSICAL THERAPY | Age: 57
Discharge: HOME | End: 2022-03-17
Payer: MEDICAID

## 2022-03-17 PROCEDURE — 97530 THERAPEUTIC ACTIVITIES: CPT

## 2022-03-17 PROCEDURE — 97112 NEUROMUSCULAR REEDUCATION: CPT

## 2022-03-17 PROCEDURE — 97110 THERAPEUTIC EXERCISES: CPT

## 2022-03-17 NOTE — PROGRESS NOTES
PT DAILY TREATMENT NOTE    Patient Name: Brenda Benitez  Date:3/17/2022  : 1965  [x]  Patient  Verified  Payor: BLUE CROSS MEDICAID / Plan: [facility] Suellen Puentesrine / Product Type: Managed Care Medicaid /    In time:2:45  Out time:3:32  Total Treatment Time (min): 47  Total Timed Codes (min): 47  1:1 Treatment Time (MC/BCBS only): 45   Visit #: 21 of 25    Treatment Dx: Right foot pain [M79.671]    SUBJECTIVE  Pain Level (0-10 scale): 2/10  Any medication changes, allergies to medications, adverse drug reactions, diagnosis change, or new procedure performed?: [x] No    [] Yes (see summary sheet for update)  Subjective functional status/changes:   [] No changes reported  \"I have some pain in my ankle but, it also feels kind of numb. \"    OBJECTIVE      27 min Therapeutic Exercise:  [x] See flow sheet :   Rationale: increase ROM, increase strength and improve coordination to improve the patients ability to return to prior level of physical activity. 10 min Therapeutic Activity:  [x]  See flow sheet :   Rationale: increase ROM, increase strength and improve coordination  to improve the patients ability to return to prior level of physical activity. 10 min Neuromuscular Re-education:  [x]  See flow sheet :   Rationale: increase ROM, increase strength and improve coordination  to improve the patients ability to return to prior level of physical activity. With   [] TE   [] TA   [] neuro   [] other: Patient Education: [x] Review HEP    [] Progressed/Changed HEP based on:   [] positioning   [] body mechanics   [] transfers   [] heat/ice application    [] other:      Other Objective/Functional Measures:      Pain Level (0-10 scale) post treatment: 0/10    ASSESSMENT/Changes in Function: Pt arrived reporting minimal pain and numbness in her foot. Pt was able to tolerate therex with minor increased discomfort.  Pt was able to perform standing Rocker Board with single Leg for foot mobility. Pt reported no pain post tx and denied modalities post tx. Patient will continue to benefit from skilled PT services to modify and progress therapeutic interventions, address functional mobility deficits, address ROM deficits, address strength deficits, analyze and address soft tissue restrictions, analyze and cue movement patterns, analyze and modify body mechanics/ergonomics and assess and modify postural abnormalities to attain remaining goals. [x]  See Plan of Care  []  See progress note/recertification  []  See Discharge Summary         Progress towards goals / Updated goals:  Short Term Goals: To be accomplished in 4 weeks:  1. Patient will be independent and compliant with HEP to progress toward goals and restore functional mobility. Eval Status: issued at eval               Last PN: Patient reports daily compliance with her HEP and denies having any questions about it.   12/29/21, met     2. Patient will improve FOTO score by 6 points to improve functional tolerance for exercise. Eval Status: FOTO 49  Last PN: FOTO 63 12/27/21 Goal Met  FOTO score = an established functional score where 100 = no disability     3. Patient will improve pain in right foot to 5/10  to improve activity tolerance and restore prior level of function. Eval Status: 9/10 at worst  Current: 4/10 pain at worst over the last week    12/29/21, met     4. Pt will have painfree right ankle and great toe AROM with at least 5 degree increase in all planes to aid in functional mechanics for ambulation/ADLs.   Eval Status:                 AROM              PROM                          Left Right Left Right   Dorsiflexion 12 -10 -10     Plantarflexion 50 30 32     Inversion 15 NT per MD restrictions       Eversion 30 NT per MD restrictions       Great Toe Ext 30 25 NT     Great Toe Flex 10 10 NT     Last PN: see below 2/23/22    Right 2/23/22   Dorsiflexion -2   Plantarflexion 45   Inversion 4   Eversion 4   Great Toe Ext WFL   Great Toe Flex [facility]    Current: Pt continues to progress with ROM therex 3/7/22        Long Term Goals: To be accomplished in 8 weeks:  1. Patient will improve FOTO score by 12 points to improve functional tolerance for ambulation and ADLs. Eval Status: Terrence Patel                          Last PN: FOTO 63 12/27/21 Goal Met  FOTO score = an established functional score where 100 = no disability     2. Pt will have painfree right ankle and great toe AROM with at least 15 degree improvement in all planes to aid in functional mechanics for ambulation/ADLs. Eval Status:                 AROM                          PROM                          Left Right Left Right   Dorsiflexion 12 -10 -10     Plantarflexion 50 30 32     Inversion 15 NT per MD restrictions       Eversion 30 NT per MD restrictions       Great Toe Ext 30 25 NT     Great Toe Flex 10 10 NT     Last PN:see below 2/23/22  AROM  Right 2/23/22   Dorsiflexion -2   Plantarflexion 45   Inversion 4   Eversion 4   Great Toe Ext WFL   Great Toe Flex WFL    Current: NA       3. Pt will have 5/5 LE strength to return to goals of ambulation and performing ADLs. Eval Status:         Strength (1-5)    Left  Right   Dorsiflexion 5 4   Plantarflexion 5 4   Inversion 5 NT per MD restrictions   Eversion 5 NT per MD restrictions   Great Toe Ext 5 3   Great Toe Flex 4+ +      Lower Extremity Left  (0-5) Right (0-5)   Hip Flexion (L1,2) 4+ 4+   Knee Extension (L3,4) 4+ 4   Knee Flexion (S1,2) 4 4   Hip IR 4+ 4   Hip ER 4+ 4   Hip Abduction 4+ 4   Hip Adduction 4+ 4+      Last PN: see below 2/23/22          Strength (1-5)    Right 2/23/22   Dorsiflexion 5   Plantarflexion 5   Inversion 3   Eversion 3   Great Toe Ext 5   Great Toe Flex 5      Lower Extremity Left 2/23/22    Right 2/23/22   Hip Flexion (L1,2) 4+ 5   Knee Extension (L3,4) 4+ 5   Knee Flexion (S1,2) 5 5   Hip IR 4+ 5   Hip ER 4+ 4+   Hip Abduction 4- 4-   Hip Adduction 4+ 4+    Current: NA        4.  Patient will ambulate 500ft mod I with LRAD demonstrating symmetrical gait pattern in order to facilitate her ability to ambulate in the community.   Eval Status: 15ft with FWW WBAT Right LE with right cam boot and gait belt donned with CGA   Last PNt: Pt amb 500 ft with SPC and Supervision, reciprocal gait pattern, good heel strike and even stride length 2/23/22  Current:Pt continues to utilize [facility] but is able to walk 500 ft pending hip pain 3/17/22     5. Patient will improve pain in right foot and ankle to 1-2/10 at worst to improve activity tolerance and restore prior level of function.              Eval Status: 9/10 at worst                           Last PN: 2-3/10 at worst in the past week 2/23/22                          Current: 2/10 at worst in the past week 3/14/22         PLAN  [x]  Upgrade activities as tolerated     [x]  Continue plan of care  []  Update interventions per flow sheet       []  Discharge due to:_  []  Other:_      Hunter Wisdom PTA 3/17/2022  3:00 PM    Future Appointments   Date Time Provider Noah Gregory   3/21/2022 12:30 PM [facility]   3/23/2022  2:00 PM [facility]   3/28/2022 12:30 PM [facility]   3/30/2022 12:30 PM [facility]   4/4/2022 12:30 PM [facility]   4/6/2022 12:30 PM Kendell Edmonds PT [facility]

## 2022-03-18 PROBLEM — M79.671 PAIN OF RIGHT HEEL: Status: ACTIVE | Noted: 2021-10-03

## 2022-03-19 PROBLEM — N17.9 ACUTE RENAL FAILURE SUPERIMPOSED ON STAGE 3 CHRONIC KIDNEY DISEASE (HCC): Status: ACTIVE | Noted: 2021-10-03

## 2022-03-19 PROBLEM — N18.30 ACUTE RENAL FAILURE SUPERIMPOSED ON STAGE 3 CHRONIC KIDNEY DISEASE (HCC): Status: ACTIVE | Noted: 2021-10-03

## 2022-03-21 ENCOUNTER — HOSPITAL ENCOUNTER (OUTPATIENT)
Dept: PHYSICAL THERAPY | Age: 57
Discharge: HOME | End: 2022-03-21
Payer: MEDICAID

## 2022-03-21 PROCEDURE — 97112 NEUROMUSCULAR REEDUCATION: CPT

## 2022-03-21 PROCEDURE — 97530 THERAPEUTIC ACTIVITIES: CPT

## 2022-03-21 PROCEDURE — 97110 THERAPEUTIC EXERCISES: CPT

## 2022-03-21 NOTE — PROGRESS NOTES
PT DAILY TREATMENT NOTE    Patient Name: Maggie Dowell  Date:3/21/2022  : 1965  [x]  Patient  Verified  Payor: BLUE CROSS MEDICAID / Plan: [facility] [employer] / Product Type: Managed Care Medicaid /    In time:12:30  Out time:1:15  Total Treatment Time (min): 45  Total Timed Codes (min): 45  1:1 Treatment Time (MC/BCBS only): 45   Visit #: 22 of 25    Treatment Dx: Right foot pain [M79.671]    SUBJECTIVE  Pain Level (0-10 scale): 10 Numbness  Any medication changes, allergies to medications, adverse drug reactions, diagnosis change, or new procedure performed?: [x] No    [] Yes (see summary sheet for update)  Subjective functional status/changes:   [] No changes reported  \"I have some pain in my ankle but, still mostly just numbness. \"    OBJECTIVE        25 min Therapeutic Exercise:  [x] See flow sheet :   Rationale: increase ROM, increase strength and improve coordination to improve the patients ability to return to prior level of physical activity. 10 min Therapeutic Activity:  [x]  See flow sheet :   Rationale: increase ROM, increase strength and improve coordination  to improve the patients ability to return to prior level of physical activity. 10 min Neuromuscular Re-education:  [x]  See flow sheet :   Rationale: increase ROM, increase strength and improve coordination  to improve the patients ability to return to prior level of physical activity. With   [] TE   [] TA   [] neuro   [] other: Patient Education: [x] Review HEP    [] Progressed/Changed HEP based on:   [] positioning   [] body mechanics   [] transfers   [] heat/ice application    [] other:      Other Objective/Functional Measures:      Pain Level (0-10 scale) post treatment: 2/10    ASSESSMENT/Changes in Function: Pt arrived reporting very little pain and ambulating with SPC. Pt continues to report high stiffness in ankle at this time.  Pt continues to be challenged by therex but, able to tolerate standing Ankle ROM. Pt continues to progress with marbles , increasing in speed and consistencey but continuing to demonstrate difficulty. Pt denied modalities post tx. Patient will continue to benefit from skilled PT services to modify and progress therapeutic interventions, address functional mobility deficits, address ROM deficits, address strength deficits, analyze and address soft tissue restrictions, analyze and cue movement patterns, analyze and modify body mechanics/ergonomics and assess and modify postural abnormalities to attain remaining goals. [x]  See Plan of Care  []  See progress note/recertification  []  See Discharge Summary         Progress towards goals / Updated goals:  Short Term Goals: To be accomplished in 4 weeks:  1. Patient will be independent and compliant with HEP to progress toward goals and restore functional mobility. Eval Status: issued at eval               Last PN: Patient reports daily compliance with her HEP and denies having any questions about it.   12/29/21, met     2. Patient will improve FOTO score by 6 points to improve functional tolerance for exercise. Eval Status: FOTO 49  Last PN: FOTO 63 12/27/21 Goal Met  FOTO score = an established functional score where 100 = no disability     3. Patient will improve pain in right foot to 5/10  to improve activity tolerance and restore prior level of function. Eval Status: 9/10 at worst  Current: 4/10 pain at worst over the last week    12/29/21, met     4. Pt will have painfree right ankle and great toe AROM with at least 5 degree increase in all planes to aid in functional mechanics for ambulation/ADLs.   Eval Status:                 AROM              PROM                          Left Right Left Right   Dorsiflexion 12 -10 -10     Plantarflexion 50 30 32     Inversion 15 NT per MD restrictions       Eversion 30 NT per MD restrictions       Great Toe Ext 30 25 NT     Great Toe Flex 10 10 NT     Last PN: see below 2/23/22    Right 2/23/22   Dorsiflexion -2   Plantarflexion 45   Inversion 4   Eversion 4   Great Toe Ext WFL   Great Toe Flex [facility]    Current: Pt continues to progress with ROM therex 3/7/22        Long Term Goals: To be accomplished in 8 weeks:  1. Patient will improve FOTO score by 12 points to improve functional tolerance for ambulation and ADLs. Eval Status: [employer]                          Last PN: FOTO 63 12/27/21 Goal Met  FOTO score = an established functional score where 100 = no disability     2. Pt will have painfree right ankle and great toe AROM with at least 15 degree improvement in all planes to aid in functional mechanics for ambulation/ADLs. Eval Status:                 AROM                          PROM                          Left Right Left Right   Dorsiflexion 12 -10 -10     Plantarflexion 50 30 32     Inversion 15 NT per MD restrictions       Eversion 30 NT per MD restrictions       Great Toe Ext 30 25 NT     Great Toe Flex 10 10 NT     Last PN:see below 2/23/22  AROM  Right 2/23/22   Dorsiflexion -2   Plantarflexion 45   Inversion 4   Eversion 4   Great Toe Ext WFL   Great Toe Flex WFL    Current: NA       3. Pt will have 5/5 LE strength to return to goals of ambulation and performing ADLs.   Eval Status:         Strength (1-5)    Left  Right   Dorsiflexion 5 4   Plantarflexion 5 4   Inversion 5 NT per MD restrictions   Eversion 5 NT per MD restrictions   Great Toe Ext 5 3   Great Toe Flex 4+ +      Lower Extremity Left  (0-5) Right (0-5)   Hip Flexion (L1,2) 4+ 4+   Knee Extension (L3,4) 4+ 4   Knee Flexion (S1,2) 4 4   Hip IR 4+ 4   Hip ER 4+ 4   Hip Abduction 4+ 4   Hip Adduction 4+ 4+      Last PN: see below 2/23/22          Strength (1-5)    Right 2/23/22   Dorsiflexion 5   Plantarflexion 5   Inversion 3   Eversion 3   Great Toe Ext 5   Great Toe Flex 5      Lower Extremity Left 2/23/22    Right 2/23/22   Hip Flexion (L1,2) 4+ 5   Knee Extension (L3,4) 4+ 5   Knee Flexion (S1,2) 5 5   Hip IR 4+ 5   Hip ER 4+ 4+   Hip Abduction 4- 4-   Hip Adduction 4+ 4+    Current: Will assess next visit for reassessment 3/21/22        4.  Patient will ambulate 500ft mod I with LRAD demonstrating symmetrical gait pattern in order to facilitate her ability to ambulate in the community.   Eval Status: 15ft with FWW WBAT Right LE with right cam boot and gait belt donned with CGA   Last PNt: Pt amb 500 ft with SPC and Supervision, reciprocal gait pattern, good heel strike and even stride length 2/23/22  Current:Pt continues to utilize [facility] but is able to walk 500 ft pending hip pain 3/17/22     5. Patient will improve pain in right foot and ankle to 1-2/10 at worst to improve activity tolerance and restore prior level of function.              Eval Status: 9/10 at worst                           Last PN: 2-3/10 at worst in the past week 2/23/22                          Current: 2/10 at worst in the past week 3/14/22             PLAN  [x]  Upgrade activities as tolerated     [x]  Continue plan of care  []  Update interventions per flow sheet       []  Discharge due to:_  []  Other:_      Kim Reason, PTA 3/21/2022  12:40 PM    Future Appointments   Date Time Provider Noah Gregory   3/23/2022  2:00 PM [facility]   3/28/2022 12:30 PM [facility]   3/30/2022 12:30 PM [facility]   4/4/2022 12:30 PM [facility]   4/6/2022 12:30 PM Marcus Salas, PT [facility]         '

## 2022-03-23 ENCOUNTER — HOSPITAL ENCOUNTER (OUTPATIENT)
Dept: PHYSICAL THERAPY | Age: 57
Discharge: HOME | End: 2022-03-23
Payer: MEDICAID

## 2022-03-23 PROCEDURE — 97530 THERAPEUTIC ACTIVITIES: CPT

## 2022-03-23 PROCEDURE — 97112 NEUROMUSCULAR REEDUCATION: CPT

## 2022-03-23 PROCEDURE — 97110 THERAPEUTIC EXERCISES: CPT

## 2022-03-23 NOTE — PROGRESS NOTES
PT DAILY TREATMENT NOTE    Patient Name: Maggie Dowell  Date:3/23/2022  : 1965  [x]  Patient  Verified  Payor: BLUE CROSS MEDICAID / Plan: [facility] [employer] / Product Type: Managed Care Medicaid /    In time:2:00  Out time:2:52  Total Treatment Time (min): 52  Total Timed Codes (min): 45  1:1 Treatment Time (MC/BCBS only): 45   Visit #: 23 of 25    Treatment Dx: Right foot pain [M79.671]    SUBJECTIVE  Pain Level (0-10 scale): 2/10  Any medication changes, allergies to medications, adverse drug reactions, diagnosis change, or new procedure performed?: [x] No    [] Yes (see summary sheet for update)  Subjective functional status/changes:   [] No changes reported  \"My ankle feels about the same. \"    OBJECTIVE    27 min Therapeutic Exercise:  [x] See flow sheet :   Rationale: increase ROM, increase strength and improve coordination to improve the patients ability to return to prior level of physical activity. 15 min Therapeutic Activity:  [x]  See flow sheet :   Rationale: increase ROM, increase strength and improve coordination  to improve the patients ability to return to prior level of physical activity. 10 min Neuromuscular Re-education:  [x]  See flow sheet :   Rationale: increase ROM, increase strength and improve coordination  to improve the patients ability to return to prior level of physical activity. With   [] TE   [] TA   [] neuro   [] other: Patient Education: [x] Review HEP    [] Progressed/Changed HEP based on:   [] positioning   [] body mechanics   [] transfers   [] heat/ice application    [] other:      Other Objective/Functional Measures:     Pain Level (0-10 scale) post treatment: 0/10    ASSESSMENT/Changes in Function: Pt continues to demonstrate improvements with general ankle functionality. Pt has made improvements with ankle ROM as well as strength. Pt does continue to demonstrate deficits with general ankle ROM, more so with Ev and Inv.  Pt reports increased distance walking outside of therapy but, does confirm continued difficulty maintain reciprocal and uniform gait pattern with SPC usage. Pt would benefit from continued therapy to address continued deficits still present in ankle. Patient will continue to benefit from skilled PT services to modify and progress therapeutic interventions, address functional mobility deficits, address ROM deficits, address strength deficits, analyze and address soft tissue restrictions, analyze and cue movement patterns, analyze and modify body mechanics/ergonomics and assess and modify postural abnormalities to attain remaining goals. [x]  See Plan of Care  []  See progress note/recertification  []  See Discharge Summary         Progress towards goals / Updated goals:  Short Term Goals: To be accomplished in 4 weeks:  1. Patient will be independent and compliant with HEP to progress toward goals and restore functional mobility. Eval Status: issued at eval               Last PN: Patient reports daily compliance with her HEP and denies having any questions about it.   12/29/21, met     2. Patient will improve FOTO score by 6 points to improve functional tolerance for exercise. Eval Status: FOTO 49  Last PN: FOTO 63 12/27/21 Goal Met  FOTO score = an established functional score where 100 = no disability     3. Patient will improve pain in right foot to 5/10  to improve activity tolerance and restore prior level of function. Eval Status: 9/10 at worst  Current: 4/10 pain at worst over the last week    12/29/21, met     4. Pt will have painfree right ankle and great toe AROM with at least 5 degree increase in all planes to aid in functional mechanics for ambulation/ADLs.   Eval Status:                 AROM              PROM                          Left Right Left Right   Dorsiflexion 12 -10 -10     Plantarflexion 50 30 32     Inversion 15 NT per MD restrictions       Eversion 30 NT per MD restrictions     Great Toe Ext 30 25 NT     Great Toe Flex 10 10 NT     Last PN: see below 2/23/22    Right   2/23/22 Right 3/23/22   Dorsiflexion -2 0   Plantarflexion 45 50   Inversion 4 5   Eversion 4 7   Great Toe Ext WFL WFL   Great Toe Flex [facility]    Current: Improvements made in all ranges by at least 5 degrees 3/23/22 MET        Long Term Goals: To be accomplished in 8 weeks:  1. Patient will improve FOTO score by 12 points to improve functional tolerance for ambulation and ADLs. Eval Status: Verlene Crigler                          Last PN: FOTO 63 12/27/21 Goal Met  FOTO score = an established functional score where 100 = no disability     2. Pt will have painfree right ankle and great toe AROM with at least 15 degree improvement in all planes to aid in functional mechanics for ambulation/ADLs. Eval Status:                 AROM                          PROM                          Left Right Left Right   Dorsiflexion 12 -10 -10     Plantarflexion 50 30 32     Inversion 15 NT per MD restrictions       Eversion 30 NT per MD restrictions       Great Toe Ext 30 25 NT     Great Toe Flex 10 10 NT     Last PN: see below 2/23/22    Right   2/23/22 Right 3/23/22   Dorsiflexion -2 0   Plantarflexion 45 50   Inversion 4 5   Eversion 4 7   Great Toe Ext [facility] WFL   Great Toe Flex [facility]    Current: Improvements made in all ranges by at least 5 degrees 3/23/22 MET       3. Pt will have 5/5 LE strength to return to goals of ambulation and performing ADLs.   Eval Status:         Strength (1-5)    Left  Right   Dorsiflexion 5 4   Plantarflexion 5 4   Inversion 5 NT per MD restrictions   Eversion 5 NT per MD restrictions   Great Toe Ext 5 3   Great Toe Flex 4+ +      Lower Extremity Left  (0-5) Right (0-5)   Hip Flexion (L1,2) 4+ 4+   Knee Extension (L3,4) 4+ 4   Knee Flexion (S1,2) 4 4   Hip IR 4+ 4   Hip ER 4+ 4   Hip Abduction 4+ 4   Hip Adduction 4+ 4+      Last PN: see below 2/23/22          Strength (1-5)    Right   2/23/22 Right 3/23/22 Dorsiflexion 5 5   Plantarflexion 5 5   Inversion 3 4   Eversion 3 4   Great Toe Ext 5 5   Great Toe Flex 5 5    (Inv and Ev available range)  Lower Extremity Left   2/23/22    Right   2/23/22 Left 3/23/22 Right 3/23/22   Hip Flexion (L1,2) 4+ 5 5 5   Knee Extension (L3,4) 4+ 5 5 5   Knee Flexion (S1,2) 5 5 5 5   Hip IR 4+ 5 5 5   Hip ER 4+ 4+ 5 5   Hip Abduction 4- 4- 4 4   Hip Adduction 4+ 4+ 5 5    Current: see above 3/23/22  Progressing      4.  Patient will ambulate 500ft mod I with LRAD demonstrating symmetrical gait pattern in order to facilitate her ability to ambulate in the community.   Eval Status: 15ft with FWW WBAT Right LE with right cam boot and gait belt donned with CGA   Last PNt: Pt amb 500 ft with SPC and Supervision, reciprocal gait pattern, good heel strike and even stride length 2/23/22  Current: Pt reports walking 2 miles with [facility] independently with friends with minor ankle swelling after performance but reported progressive loss of symmetrical gait pattern  3/23/22 Progressing     5. Patient will improve pain in right foot and ankle to 1-2/10 at worst to improve activity tolerance and restore prior level of function.              Eval Status: 9/10 at [employer] PN: 2-3/10 at worst in the past week 2/23/22                          Current: 2/10 at worst in the past several weeks 3/23/22 Met        PLAN  [x]  Upgrade activities as tolerated     [x]  Continue plan of care  []  Update interventions per flow sheet       []  Discharge due to:_  []  Other:_      Jaylin Jeter, YASEMIN 3/23/2022  2:03 PM    Future Appointments   Date Time Provider Noah Gregory   3/28/2022 12:30 PM [facility]   3/30/2022 12:30 PM [facility]   4/4/2022 12:30 PM [facility]   4/6/2022 12:30 PM Eligio Nye PT [facility]

## 2022-03-23 NOTE — PROGRESS NOTES
[facility]  [address] [place], 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 Patient will be independent and compliant with HEP to progress toward goals and restore functional mobility. Eval Status: issued at eval               Last PN: Patient reports daily compliance with her HEP and denies having any questions about it.   21, met     2. Patient will improve FOTO score by 6 points to improve functional tolerance for exercise. Eval Status: FOTO 49  Last PN: FOTO 63 21 Goal Met  FOTO score = an established functional score where 100 = no disability     3. Patient will improve pain in right foot to 5/10  to improve activity tolerance and restore prior level of function. Eval Status: 9/10 at worst  Current: 4/10 pain at worst over the last week    21, met     4. Pt will have painfree right ankle and great toe AROM with at least 5 degree increase in all planes to aid in functional mechanics for ambulation/ADLs.   Eval Status:                 AROM              PROM                          Left Right Left Right   Dorsiflexion 12 -10 -10     Plantarflexion 50 30 32     Inversion 15 NT per MD restrictions       Eversion 30 NT per MD restrictions       Great Toe Ext 30 25 NT     Great Toe Flex 10 10 NT     Last PN: see below 2/23/22    Right   2/23/22 Right 3/23/22   Dorsiflexion -2 0   Plantarflexion 45 50   Inversion 4 5   Eversion 4 7   Great Toe Ext WFL WFL   Great Toe Flex [facility]    Current: Improvements made in all ranges by at least 5 degrees 3/23/22 MET        Long Term Goals: To be accomplished in 8 weeks:  1. Patient will improve FOTO score by 12 points to improve functional tolerance for ambulation and ADLs. Eval Status: Kelvin Bartlett                          Last PN: FOTO 63 12/27/21 Goal Met  FOTO score = an established functional score where 100 = no disability     2. Pt will have painfree right ankle and great toe AROM with at least 15 degree improvement in all planes to aid in functional mechanics for ambulation/ADLs. Eval Status:                 AROM                          PROM                          Left Right Left Right   Dorsiflexion 12 -10 -10     Plantarflexion 50 30 32     Inversion 15 NT per MD restrictions       Eversion 30 NT per MD restrictions       Great Toe Ext 30 25 NT     Great Toe Flex 10 10 NT     Last PN: see below 2/23/22    Right   2/23/22 Right 3/23/22   Dorsiflexion -2 0   Plantarflexion 45 50   Inversion 4 5   Eversion 4 7   Great Toe Ext [facility] WFL   Great Toe Flex [facility]    Current: Improvements made in all ranges by at least 5 degrees 3/23/22 MET       3. Pt will have 5/5 LE strength to return to goals of ambulation and performing ADLs.   Eval Status:         Strength (1-5)    Left  Right   Dorsiflexion 5 4   Plantarflexion 5 4   Inversion 5 NT per MD restrictions   Eversion 5 NT per MD restrictions   Great Toe Ext 5 3   Great Toe Flex 4+ +      Lower Extremity Left  (0-5) Right (0-5)   Hip Flexion (L1,2) 4+ 4+   Knee Extension (L3,4) 4+ 4   Knee Flexion (S1,2) 4 4   Hip IR 4+ 4   Hip ER 4+ 4   Hip Abduction 4+ 4   Hip Adduction 4+ 4+      Last PN: see below 2/23/22          Strength (1-5)    Right   2/23/22 Right 3/23/22   Dorsiflexion 5 5   Plantarflexion 5 5   Inversion 3 4   Eversion 3 4   Great Toe Ext 5 5   Great Toe Flex 5 5    (Inv and Ev available range)  Lower Extremity Left   2/23/22    Right   2/23/22 Left 3/23/22 Right 3/23/22   Hip Flexion (L1,2) 4+ 5 5 5   Knee Extension (L3,4) 4+ 5 5 5   Knee Flexion (S1,2) 5 5 5 5   Hip IR 4+ 5 5 5   Hip ER 4+ 4+ 5 5   Hip Abduction 4- 4- 4 4   Hip Adduction 4+ 4+ 5 5    Current: see above 3/23/22  Progressing      4. Patient will ambulate 500ft mod I with LRAD demonstrating symmetrical gait pattern in order to facilitate her ability to ambulate in the community.   Eval Status: 15ft with FWW WBAT Right LE with right cam boot and gait belt donned with CGA   Last PNt: Pt amb 500 ft with SPC and Supervision, reciprocal gait pattern, good heel strike and even stride length 2/23/22  Current: Pt reports walking 2 miles with [facility] independently with friends with minor ankle swelling after performance but reported progressive loss of symmetrical gait pattern  3/23/22 Progressing     5. Patient will improve pain in right foot and ankle to 1-2/10 at worst to improve activity tolerance and restore prior level of function.              Eval Status: 9/10 at [employer] PN: 2-3/10 at worst in the past week 2/23/22                          Current: 2/10 at worst in the past several weeks 3/23/22 Met    Key Functional Changes: Pt arrived reporting very little pain and ambulating with SPC. Pt continues to report high stiffness in ankle at this time. Pt continues to be challenged by therex but, able to tolerate standing Ankle ROM. Pt continues to progress with marbles , increasing in speed and consistencey but continuing to demonstrate difficulty. Pt denied modalities post tx.      ASSESSMENT/RECOMMENDATIONS:  [x]Continue therapy per initial plan/protocol at a frequency of  2 x per week for 4 weeks  []Continue therapy with the following recommended changes:_____________________ _____________________________ ________________________________________  []Discontinue therapy progressing towards or have reached established goals  []Discontinue therapy due to lack of appreciable progress towards goals  []Discontinue therapy due to lack of attendance or compliance  []Await Physician's recommendations/decisions regarding therapy  []Other:________________________________________________________________    Thank you for this referral.   Jaylin Jeter, YASEMIN 3/23/2022 2:40 PM    Signed By: Cam Waldron, SHI     March 23, 2022

## 2022-03-28 ENCOUNTER — APPOINTMENT (OUTPATIENT)
Dept: PHYSICAL THERAPY | Age: 57
End: 2022-03-28
Payer: MEDICAID

## 2022-03-30 ENCOUNTER — TELEPHONE (OUTPATIENT)
Dept: PHYSICAL THERAPY | Age: 57
End: 2022-03-30

## 2022-04-04 ENCOUNTER — TELEPHONE (OUTPATIENT)
Dept: PHYSICAL THERAPY | Age: 57
End: 2022-04-04

## 2022-04-06 ENCOUNTER — TELEPHONE (OUTPATIENT)
Dept: PHYSICAL THERAPY | Age: 57
End: 2022-04-06

## 2022-04-20 NOTE — PROGRESS NOTES
[facility]  [address], [address], [address] [facility]  Phone [phone]  Fax [phone]    Physical Therapy Discharge Summary    Patient name: Vicky Jin Start of Care: 2021   Referral source: Soco Rolle MD : 1965   Medical/Treatment Diagnosis: Right foot pain [M79.671] Onset Date:21   Prior Hospitalization: see medical history Provider#: [id number]   Medications: Verified on Patient Summary List     Comorbidities: Right foot subtalar, talonavicular, and 2nd TMT fusion 21; Bilat TKA; Hx of bilat shoulder pain; 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 Please see below for the most recent goals assessment while patient was under the care of this PT clinic. Please D/C to HEP at this time. Patient will require new order if he or she requires further physical therapy services. Thank you for the referral to [facility]. Short Term Goals: To be accomplished in 4 weeks:  1. Patient will be independent and compliant with HEP to progress toward goals and restore functional mobility. Eval Status: issued at eval               Last PN: Patient reports daily compliance with her HEP and denies having any questions about it.   21, met     2. Patient will improve FOTO score by 6 points to improve functional tolerance for exercise. Eval Status: FOTO 49  Last PN: FOTO 63 21 Goal Met  FOTO score = an established functional score where 100 = no disability     3.  Patient will improve pain in right foot to 5/10  to improve activity tolerance and restore prior level of function. Eval Status: 9/10 at worst  Current: 4/10 pain at worst over the last week    12/29/21, met     4. Pt will have painfree right ankle and great toe AROM with at least 5 degree increase in all planes to aid in functional mechanics for ambulation/ADLs. Eval Status:                 AROM              PROM                          Left Right Left Right   Dorsiflexion 12 -10 -10     Plantarflexion 50 30 32     Inversion 15 NT per MD restrictions       Eversion 30 NT per MD restrictions       Great Toe Ext 30 25 NT     Great Toe Flex 10 10 NT     Last PN: see below 2/23/22    Right   2/23/22 Right 3/23/22   Dorsiflexion -2 0   Plantarflexion 45 50   Inversion 4 5   Eversion 4 7   Great Toe Ext WFL WFL   Great Toe Flex [facility]    Current: Improvements made in all ranges by at least 5 degrees 3/23/22 MET        Long Term Goals: To be accomplished in 8 weeks:  1. Patient will improve FOTO score by 12 points to improve functional tolerance for ambulation and ADLs. Eval Status: Netta Pizarro                          Last PN: FOTO 63 12/27/21 Goal Met  FOTO score = an established functional score where 100 = no disability     2. Pt will have painfree right ankle and great toe AROM with at least 15 degree improvement in all planes to aid in functional mechanics for ambulation/ADLs. Eval Status:                 AROM                          PROM                          Left Right Left Right   Dorsiflexion 12 -10 -10     Plantarflexion 50 30 32     Inversion 15 NT per MD restrictions       Eversion 30 NT per MD restrictions       Great Toe Ext 30 25 NT     Great Toe Flex 10 10 NT     Last PN: see below 2/23/22    Right   2/23/22 Right 3/23/22   Dorsiflexion -2 0   Plantarflexion 45 50   Inversion 4 5   Eversion 4 7   Great Toe Ext [facility] WFL   Great Toe Flex [facility]    Current: Improvements made in all ranges by at least 5 degrees 3/23/22 MET       3.  Pt will have 5/5 LE strength to return to goals of ambulation and performing ADLs.  Eval Status:         Strength (1-5)    Left  Right   Dorsiflexion 5 4   Plantarflexion 5 4   Inversion 5 NT per MD restrictions   Eversion 5 NT per MD restrictions   Great Toe Ext 5 3   Great Toe Flex 4+ +      Lower Extremity Left  (0-5) Right (0-5)   Hip Flexion (L1,2) 4+ 4+   Knee Extension (L3,4) 4+ 4   Knee Flexion (S1,2) 4 4   Hip IR 4+ 4   Hip ER 4+ 4   Hip Abduction 4+ 4   Hip Adduction 4+ 4+      Last PN: see below 2/23/22          Strength (1-5)    Right   2/23/22 Right 3/23/22   Dorsiflexion 5 5   Plantarflexion 5 5   Inversion 3 4   Eversion 3 4   Great Toe Ext 5 5   Great Toe Flex 5 5    (Inv and Ev available range)  Lower Extremity Left   2/23/22    Right   2/23/22 Left 3/23/22 Right 3/23/22   Hip Flexion (L1,2) 4+ 5 5 5   Knee Extension (L3,4) 4+ 5 5 5   Knee Flexion (S1,2) 5 5 5 5   Hip IR 4+ 5 5 5   Hip ER 4+ 4+ 5 5   Hip Abduction 4- 4- 4 4   Hip Adduction 4+ 4+ 5 5    Current: see above 3/23/22  Progressing      4.  Patient will ambulate 500ft mod I with LRAD demonstrating symmetrical gait pattern in order to facilitate her ability to ambulate in the community.   Eval Status: 15ft with FWW WBAT Right LE with right cam boot and gait belt donned with CGA   Last PNt: Pt amb 500 ft with SPC and Supervision, reciprocal gait pattern, good heel strike and even stride length 2/23/22  Current: Pt reports walking 2 miles with [facility] independently with friends with minor ankle swelling after performance but reported progressive loss of symmetrical gait pattern  3/23/22 Progressing     5. Patient will improve pain in right foot and ankle to 1-2/10 at worst to improve activity tolerance and restore prior level of function.              Eval Status: 9/10 at [employer] PN: 2-3/10 at worst in the past week 2/23/22                          Current: 2/10 at worst in the past several weeks 3/23/22 Met      RECOMMENDATIONS:  [x]Discontinue therapy: []Patient has reached or is progressing toward set goals      [x]Patient is non-compliant or has abdicated      []Due to lack of appreciable progress towards set goals    Thank you for the referral to [facility]! Lilian Pepper [place], PT  4/20/2022   10:13 AM    ------------------------------------------------------------------------------------------------------------------------  NOTE TO PHYSICIAN:  Please complete the following and fax to: [facility] at THE [facility] at [phone]  If you are unable to process this request in   24 hours, please contact our office. [] I have read the above report and request that my patient continue therapy with the following changes/special instructions:  [] I have read the above report and request that my patient be discharged from therapy.     [de-identified] Signature:____________________ Date:_________ TIME:________                                        Jeniffer Payne MD      ** Signature, Date and Time must be completed for valid certification **

## 2022-06-21 ENCOUNTER — HOSPITAL ENCOUNTER (OUTPATIENT)
Dept: PHYSICAL THERAPY | Age: 57
Discharge: HOME | End: 2022-06-21
Payer: MEDICAID

## 2022-06-21 PROCEDURE — 97162 PT EVAL MOD COMPLEX 30 MIN: CPT

## 2022-06-21 NOTE — PROGRESS NOTES
PHYSICAL THERAPY EVALUATION / DAILY TREATMENT      Patient Name: Maureen Castellanos  Date: 2022  : 1965  [x] Patient  Verified  Payor: BLUE [employer] MEDICAID / Plan: [facility] [employer] PLUS / Product Type: Managed Care Medicaid /    In Time: 4:09  Out Time: 5:04  Total Treatment Time (min): 55  Visit #: 1    Medicare / Maryam Quale Only   Total Timed Codes (min):  0 1:1 Treatment Time:  55     Treatment Area: Other abnormalities of gait and mobility [R26.89]    SUBJECTIVE:  Chief Complaint/Mechanism of Injury:   Patient is a very pleasant 62 y.o. female with c/o gait instability that has progressed over the past 6 months, including 2 falls during that time. Patient states both falls involved buckling of her knees. Patient states she feels as though her balance has worsened because of pain in multiple joints, including right>left hip pain, left>right knee pain, and right>left ankle pain. Patient states she has not had any surgeries on her hips, however, she's been told about 6 months ago that she needs both hips replaced \"because of necrotic bone\". Patient also states her left knee was replaced in , followed by her right knee replacement 3 months later in 2017. Patient also reports having a right ankle fusion on 21, which she states she is still having intermittent \"nerve pain\" along her plantar toes. Patient also reports pain of her left ankle and states she has been told she needs a fusion of her left ankle as well. Patient states her surgeon has advised her that he is unable to perform surgery on her hips and/or left ankle \"because my iron is too low. My iron level is a 2 and they said it has to be at least a 4 before they will operate. \"  Patient states she is taking iron tablets as well as monthly iron shots, but she states the doctors haven't recently discussed having a blood transfusion.   Patient states she had regained functional walking and \"decent\" balance after the recovery from her knee replacements in 2016/2017. Patient states her gait/balance had been going well including ambulating without AD, but she started having decreased balance/walking 6 months ago and now she is dependent on a rollator and/or WBQC. In addition, she c/o difficulty with sit <> stands, stair negotiations (has done step-to pattern since knee replacement), and curb negotiations. However, patient states she is able to \"mostly\" do all of her normal activities such as grocery shopping/errands and cleaning her home, however, \"it just takes me longer to do these because I don't want to fall. \"  Patient does not work as she is on disability. Her hobbies include sewing, baking, crocheting, and reading. Patient states she is trying to find a pain management physician that takes her insurance. Patient states is schedule for a left hip cortisone injection on 7/7/22.     Pain Level (out of 0-10 scale): pain ranges from 6-8; currently 6  [x] constant, [] intermittent, [] improving, [] worsening, [x] no change since onset  Alleviating Factors: heat  Previous Treatment for Current Injury: multiple surgeries on multiple joints as mentioned above, multiple injections, skilled PT post-replacements  Diagnostic Imaging for Current Injury: x-rays, CT of hips  Living Situation: lives with youngest daughter (adult) in a 2 story home with 4 steps to enter without handrails, first floor bedroom setup  Hand Dominance: [x] right handed, [] left handed, [] ambidextrous    Comorbidities: s/p bilateral knee replacements (2016/2017), s/p right ankle fusion Sept 2021, OA multiple joints needing bilateral hip replacements and left ankle fusion , chronic kidney disease though no dialysis, chronic iron-deficiency anemia (her lifetime per patient), heart cardioversion secondary to A-fib (April 2022), , RA, cataracts, hypertension, gout, \"sensitive stomach\", eczema  Substance Use: [] tobacco use, [] alcohol use, [] other:   Prior Level of Function:  Prior to past 6 months: no AD, could ambulate shorter distances, step-to pattern on steps for years, decreased balance, pain of multiple joints limiting function  [] Unrestricted with functional activities and ADLs  [] No assistive device  [] active lifestyle, [] moderately active lifestyle, [x] sedentary lifestyle  Patients Goals:  \"to get my balance back without having to use a cane or walker\"    Potential Barriers for PT: [] pain, [] financial, [] time, [] transportation, [] other:  Motivation: Good  Cognition: A&O x 3  Denies Red Flags: SOB, chest pain, dizziness/lightheadedness, blurred/double vision, HA, chills/fevers, night sweats, change in bowel/bladder control, abdominal pain, difficulty swallowing, slurred speech, unexplained weight gain/loss, nausea, and/or vomiting.   Any medication changes, allergies to medications, adverse drug reactions, diagnosis change, or new procedure performed?: [x] No     [] Yes (see summary sheet for update)      OBJECTIVE/EXAMINATION:    55 min [x] Eval                  [] Re-Evaluation     Physical Therapy Evaluation:    Inspection: Patient in no apparent distress    Posture: poor posture with loss of cervical lordosis and forward shoulders, forward flexed trunk, hyperextension of bilateral knees in standing, bilateral pes planus    Gait: Abnormal Gait Mechanics: with WBQC: slow gait speed with WBOS, left>right slap foot, left knee hyperextension during heel strike and mid-stance, bilateral hip extension, forward flexed trunk, difficulty with turning with decreased stability during turns    MMT: Bilateral LE's grossly 4-/5 except for right knee extension/flexion 4/5, left hip abd 2-/5, right hip abduction 3-/5    Special Tests: TU secs with rollator, 17 secs with WBQC; 30\" STS test: 7 reps with moderate use of UE's to push from table    Other Comments: none     Pain Level (out of 0-10 scale) Post Treatment: 6/10      ASSESSMENT:  Patient is a very pleasant 57 y.o. female with a significant PMH, contributing to progressing deficits in gait and balance over the past 6 months, including 2 falls during that time. Patient states both falls involved buckling of her knees. Patient states she feels as though her balance has worsened because of pain in multiple joints, including right>left hip pain, left>right knee pain, and right>left ankle pain. Patient states she has not had any surgeries on her hips, however, she's been told about 6 months ago that she needs both hips replaced \"because of necrotic bone\". Patient also states her left knee was replaced in 2016, followed by her right knee replacement 3 months later in 2017. Patient also reports having a right ankle fusion on 9/1/21, which she states she is still having intermittent \"nerve pain\" along her plantar toes. Patient also reports pain of her left ankle and states she has been told she needs a fusion of her left ankle as well. Patient states her surgeon has advised her that he is unable to perform surgery on her hips and/or left ankle \"because my iron is too low. My iron level is a 2 and they said it has to be at least a 4 before they will operate. \"  Patient states she is taking iron tablets as well as monthly iron shots, but she states the doctors haven't recently discussed having a blood transfusion. Patient states she had regained functional walking and \"decent\" balance after the recovery from her knee replacements in 2016/2017. Patient states her gait/balance had been going well including ambulating without AD, but she started having decreased balance/walking 6 months ago and now she is dependent on a rollator and/or WBQC. In addition, she c/o difficulty with sit <> stands, stair negotiations (has done step-to pattern since knee replacement), and curb negotiations.   However, patient states she is able to \"mostly\" do all of her normal activities such as grocery shopping/errands and cleaning her home, however, \"it just takes me longer to do these because I don't want to fall. \"    Examination findings today revealed significant weakness of bilateral hips/glutes as well as moderate weakness of her remaining LE musculature. She is also with an abnormal gait pattern as well as atypical scores during TUG test and 30\" STS, all of which place her at a higher risk for falls. Patient would benefit from skilled PT services to modify and progress therapeutic interventions, address functional mobility deficits, address ROM deficits, address strength deficits, analyze and address soft tissue restrictions, analyze and cue movement patterns, analyze and modify body mechanics/ergonomics, and assess and modify postural abnormalities to attain remaining goals. Patient did well with today's evaluation. Patient was educated in [facility] and all questions were answered. [x]  See Plan of Care  []  See Progress Note/Recertification  []  See Discharge Summary         GOALS:  Short Term Goals:  to be accomplished within 4 weeks:    Patient will report compliance with prescribed HEP(s) at least 1x/day in order to assist in maximizing therapeutic gains, reduce symptoms, and to progress towards overall prior function. Eval: HEP to be issued and reviewed with patient within 1-2 visits  Current: same as above    Patient will be able to perform TUG test in less than or equal to 12 seconds with [facility] in order to demonstrate improved stability during ambulation, thus reducing the patient's risk of falls. Eval: TUG Test: 17 secs with rollator, 17 secs with [facility]  Current: same as eval    Patient will be able to perform at least 10 reps of sit <> stands with minimal use of UE's to push from chair during 30\" STS test to demonstrate improved LE strength and stability during this functional activity, thus reducing the patients risk of falls.   Eval: 30\" STS: 7 reps with use of moderate use of UE's to push from table  Current: same as eval      Long Term Goals:  to be accomplished within 8 weeks:    Patient will score at least 54 points on FOTO in order to maximize function and promote patient satisfaction with overall outcome. (Sherral Fast is an established functional score where 100 = no disability)  Eval: 46  Current: same as above    Patient will report at least a 25% reduction in pain/symptoms when performing ADLs/functional activities in order to improve overall tolerance to functional movements. Eval: pain ranges from 6-8/10 - bilateral hips, bilateral knees, and bilateral ankles  Current: same as above    Patient will demonstrate at least 4/5 strength of bilateral LE's in order to be able to safely perform functional activities and have a decreased risk of falls. Eval: Bilateral LE's grossly 4-/5 except for right knee extension/flexion 4/5, left hip abd 2-/5, right hip abduction 3-/5  Current: same as above    Patient will be able to perform TUG test in less than or equal to 12 seconds without AD in order to demonstrate improved stability during ambulation, thus reducing the patient's risk of falls. Eval: TUG Test: 17 secs with rollator, 17 secs with [facility] AND [place]  Current: same as eval    Patient will be able to perform at least 10 reps of sit <> stands without use of UE's during 30\" STS test to demonstrate improved LE strength and stability during this functional activity, thus reducing the patients risk of falls. Eval: 30\" STS: 7 reps with use of moderate use of UE's to push from table  Current: same as eval    PLAN  [x]  Continue Plan of Care  []  Upgrade Activities as Tolerated       [x]  Update Interventions per Flow Sheet, as Tolerated by Patient       []  Discharge Due To:   []  Other: Thank you for the referral to [facility]. Jasmine Peres.  Ludy, PT, DPT, ATR     6/21/2022     9:10 AM

## 2022-06-21 NOTE — PROGRESS NOTES
[facility]  [address] [place], [address] [facility] [address]  Ph [phone]  Fx [phone]    Plan of Care/ Statement of Necessity for Physical Therapy Services    Patient name: Akshat Del Rio Start of Care: 2022   Referral source: Anna Srinivasan NP : 1965    Medical Diagnosis: Other abnormalities of gait and mobility [R26.89]   Onset Date:22    Treatment Diagnosis: gait instability                                             ICD-10: Other abnormalities of gait and mobility [R26.89]   Prior Hospitalization: see medical history Provider#: [id number]   Medications: Verified on Patient summary List    Comorbidities: s/p bilateral knee replacements (), s/p right ankle fusion 2021, 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 tobacco use, []? alcohol use, []? other:   Prior Level of Function:  Prior to past 6 months: no AD, could ambulate shorter distances, step-to pattern on steps for years, decreased balance, pain of multiple joints limiting function  []? Unrestricted with functional activities and ADLs  []? No assistive device  []? active lifestyle, []? moderately active lifestyle, [x]? sedentary lifestyle  Patients Goals:  \"to get my balance back without having to use a cane or walker\"      The Plan of Care and following information is based on the information from the initial evaluation. Assessment/ key information: Patient is a very pleasant 62 y.o. female with a significant PMH, contributing to progressing deficits in gait and balance over the past 6 months, including 2 falls during that time. Patient states both falls involved buckling of her knees.   Patient states she feels as though her balance has worsened because of pain in multiple joints, including right>left hip pain, left>right knee pain, and right>left ankle pain. Patient states she has not had any surgeries on her hips, however, she's been told about 6 months ago that she needs both hips replaced \"because of necrotic bone\". Patient also states her left knee was replaced in 2016, followed by her right knee replacement 3 months later in 2017. Patient also reports having a right ankle fusion on 9/1/21, which she states she is still having intermittent \"nerve pain\" along her plantar toes. Patient also reports pain of her left ankle and states she has been told she needs a fusion of her left ankle as well. Patient states her surgeon has advised her that he is unable to perform surgery on her hips and/or left ankle \"because my iron is too low. My iron level is a 2 and they said it has to be at least a 4 before they will operate. \"  Patient states she is taking iron tablets as well as monthly iron shots, but she states the doctors haven't recently discussed having a blood transfusion. Patient states she had regained functional walking and \"decent\" balance after the recovery from her knee replacements in 2016/2017. Patient states her gait/balance had been going well including ambulating without AD, but she started having decreased balance/walking 6 months ago and now she is dependent on a rollator and/or WBQC. In addition, she c/o difficulty with sit <> stands, stair negotiations (has done step-to pattern since knee replacement), and curb negotiations. However, patient states she is able to \"mostly\" do all of her normal activities such as grocery shopping/errands and cleaning her home, however, \"it just takes me longer to do these because I don't want to fall. \"     Examination findings today revealed significant weakness of bilateral hips/glutes as well as moderate weakness of her remaining LE musculature.   She is also with an abnormal gait pattern as well as atypical scores during TUG test and 30\" STS, all of which place her at a higher risk for falls. Patient would benefit from skilled PT services to modify and progress therapeutic interventions, address functional mobility deficits, address ROM deficits, address strength deficits, analyze and address soft tissue restrictions, analyze and cue movement patterns, analyze and modify body mechanics/ergonomics, and assess and modify postural abnormalities to attain remaining goals. Patient did well with today's evaluation. Patient was educated in [facility] and all questions were answered. Evaluation Complexity History HIGH Complexity :3+ comorbidities / personal factors will impact the outcome/ POC ; Examination MEDIUM Complexity : 3 Standardized tests and measures addressing body structure, function, activity limitation and / or participation in recreation  ;Presentation MEDIUM Complexity : Evolving with changing characteristics  ; Clinical Decision Making MEDIUM Complexity : FOTO score of 26-74 : FOTO score = an established functional score where 100 = no disability  Overall Complexity Rating: MEDIUM    Problem List: pain affecting function, decrease ROM, decrease strength, impaired gait/ balance, decrease ADL/ functional abilitiies, decrease activity tolerance, decrease flexibility/ joint mobility and decrease transfer abilities   Treatment Plan may include any combination of the following: Therapeutic exercise, Therapeutic activities, Neuromuscular re-education, Physical agent/modality, Gait/balance training, Manual therapy, Aquatic therapy, Patient education, Self Care training, Functional mobility training, Home safety training and Stair training  Patient / Family readiness to learn indicated by: asking questions, trying to perform skills and interest  Persons(s) to be included in education: patient  Barriers to Learning/Limitations: none  Measures taken if barriers to learning: n/a  Patient Self Reported Health Status: fair  Rehabilitation Potential: fair to good secondary to multiple comorbidities    Goals:  Short Term Goals:    to be accomplished within 4 weeks:     Patient will report compliance with prescribed HEP(s) at least 1x/day in order to assist in maximizing therapeutic gains, reduce symptoms, and to progress towards overall prior function. Eval: HEP to be issued and reviewed with patient within 1-2 visits  Current: same as above     Patient will be able to perform TUG test in less than or equal to 12 seconds with [facility] in order to demonstrate improved stability during ambulation, thus reducing the patient's risk of falls. Eval: TUG Test: 17 secs with rollator, 17 secs with [facility]  Current: same as eval     Patient will be able to perform at least 10 reps of sit <> stands with minimal use of UE's to push from chair during 30\" STS test to demonstrate improved LE strength and stability during this functional activity, thus reducing the patients risk of falls. Eval: 30\" STS: 7 reps with use of moderate use of UE's to push from table  Current: same as eval        Long Term Goals:     to be accomplished within 8 weeks:     Patient will score at least 54 points on FOTO in order to maximize function and promote patient satisfaction with overall outcome. (Hermes Rodriguez is an established functional score where 100 = no disability)  Eval: 46  Current: same as above     Patient will report at least a 25% reduction in pain/symptoms when performing ADLs/functional activities in order to improve overall tolerance to functional movements. Eval: pain ranges from 6-8/10 - bilateral hips, bilateral knees, and bilateral ankles  Current: same as above     Patient will demonstrate at least 4/5 strength of bilateral LE's in order to be able to safely perform functional activities and have a decreased risk of falls.   Eval: Bilateral LE's grossly 4-/5 except for right knee extension/flexion 4/5, left hip abd 2-/5, right hip abduction 3-/5  Current: same as above     Patient will be able to perform TUG test in less than or equal to 12 seconds without AD in order to demonstrate improved stability during ambulation, thus reducing the patient's risk of falls. Eval: TUG Test: 17 secs with rollator, 17 secs with [facility] [place]  Current: same as eval     Patient will be able to perform at least 10 reps of sit <> stands without use of UE's during 30\" STS test to demonstrate improved LE strength and stability during this functional activity, thus reducing the patients risk of falls. Eval: 30\" STS: 7 reps with use of moderate use of UE's to push from table  Current: same as eval    Frequency / Duration: Patient to be seen 3x/week x 4 weeks, then 2x/week x 4 weeks. Patient/ Caregiver education and instruction: Diagnosis, prognosis, other energy consumption education   [x]  Plan of care has been reviewed with PTA    Thank you for the referral to [facility]. Justin Boston, PT, DPT, ATR     2022     9:11 AM    Patient Name: Nelly Ramirez Patient : 1965    ________________________________________________________________________    I certify that the above Therapy Services are being furnished while the patient is under my care. I agree with the treatment plan and certify that this therapy is necessary.     Physician's Signature:_____________________Date:____________TIME:________                                      Billy Rodriguez NP      ** Signature, Date and Time must be completed for valid certification **  Please sign and return to [facility]  [address] [place], [address] [facility] [place]  Ph [phone]  Fx [phone]

## 2022-07-05 ENCOUNTER — HOSPITAL ENCOUNTER (OUTPATIENT)
Dept: PHYSICAL THERAPY | Age: 57
Discharge: HOME | End: 2022-07-05
Payer: MEDICAID

## 2022-07-05 PROCEDURE — 97110 THERAPEUTIC EXERCISES: CPT

## 2022-07-05 PROCEDURE — 97112 NEUROMUSCULAR REEDUCATION: CPT

## 2022-07-05 PROCEDURE — 97530 THERAPEUTIC ACTIVITIES: CPT

## 2022-07-05 NOTE — PROGRESS NOTES
PT DAILY TREATMENT NOTE    Patient Name: Lakia Hubbard  Date:2022  : 1965  [x]  Patient  Verified  Payor: BLUE [employer] MEDICAID / Plan: [facility] [employer] PLUS / Product Type: Managed Care Medicaid /    In time:1:18  Out time:1:58  Total Treatment Time (min): 40  Total Timed Codes (min): 40  1:1 Treatment Time (MC/BCBS only): 40   Visit #: 2 of 20    Treatment Dx: Other abnormalities of gait and mobility [R26.89]    SUBJECTIVE  Pain Level (0-10 scale): 5/10  Any medication changes, allergies to medications, adverse drug reactions, diagnosis change, or new procedure performed?: [x] No    [] Yes (see summary sheet for update)  Subjective functional status/changes:   [] No changes reported  Patient states she is getting cortisone injections on Thursday for her left hip. She was also told the fluid in her knees is actually gout. OBJECTIVE     20 min Therapeutic Exercise:  [x] See flow sheet :   Rationale: increase ROM, increase strength, improve coordination, improve balance and increase proprioception to improve the patients ability to strengthen LE's to return to PLOF. 10 min Therapeutic Activity:  [x]  See flow sheet :   Rationale: increase ROM, increase strength, improve coordination, improve balance and increase proprioception  to improve the patients ability to squat, bend, and complete transitional movements. 10 min Neuromuscular Re-education:  -  See flow sheet :   Rationale: increase strength, improve coordination and increase proprioception  to improve the patients ability to activate abdominals and gluteals without compensation to return to PLOF.           With   [x] TE   [] TA   [] neuro   [] other: Patient Education: [x] Review HEP    [x] Progressed/Changed HEP based on:   [] positioning   [] body mechanics   [] transfers   [] heat/ice application    [] other:      Other Objective/Functional Measures:      Pain Level (0-10 scale) post treatment: 5/10    ASSESSMENT/Changes in Function: Patient tolerated treatment session well today. Patient had no complaints with addition of heel raise, toe raise, 3-way hip, LAQ, STS, bridges, supine clams and hip adduction to exercise program to accomplish improved LE strengthening. Patient given HEP with new exercises. Patient demonstrated understanding of exercise program. Patient continues to make steady progress toward goals and would benefit from continued skilled PT intervention to address remaining deficits outlined in goals below. Patient will continue to benefit from skilled PT services to modify and progress therapeutic interventions, address functional mobility deficits, address ROM deficits, address strength deficits, analyze and address soft tissue restrictions, analyze and cue movement patterns, analyze and modify body mechanics/ergonomics, assess and modify postural abnormalities, address imbalance/dizziness and instruct in home and community integration to attain remaining goals. [x]  See Plan of Care  []  See progress note/recertification  []  See Discharge Summary         Progress towards goals / Updated goals:  Short Term Goals:    to be accomplished within 4 weeks:     Patient will report compliance with prescribed HEP(s) at least 1x/day in order to assist in maximizing therapeutic gains, reduce symptoms, and to progress towards overall prior function. Eval: HEP to be issued and reviewed with patient within 1-2 visits  Current: HEP issues and reviewed with patient including heel raise, toe raise, 3-way hip, LAQ, STS, bridges, supine clams and hip adduction 7/5/22     Patient will be able to perform TUG test in less than or equal to 12 seconds with [facility] in order to demonstrate improved stability during ambulation, thus reducing the patient's risk of falls.   Eval: TUG Test: 17 secs with rollator, 17 secs with [facility]  Current: same as eval     Patient will be able to perform at least 10 reps of sit <> stands with minimal use of UE's to push from chair during 30\" STS test to demonstrate improved LE strength and stability during this functional activity, thus reducing the patients risk of falls. Eval: 30\" STS: 7 reps with use of moderate use of UE's to push from table  Current: same as eval        Long Term Goals:     to be accomplished within 8 weeks:     Patient will score at least 54 points on FOTO in order to maximize function and promote patient satisfaction with overall outcome. (Jameson Repine is an established functional score where 100 = no disability)  Eval: 46  Current: same as above     Patient will report at least a 25% reduction in pain/symptoms when performing ADLs/functional activities in order to improve overall tolerance to functional movements. Eval: pain ranges from 6-8/10 - bilateral hips, bilateral knees, and bilateral ankles  Current: same as above     Patient will demonstrate at least 4/5 strength of bilateral LE's in order to be able to safely perform functional activities and have a decreased risk of falls. Eval: Bilateral LE's grossly 4-/5 except for right knee extension/flexion 4/5, left hip abd 2-/5, right hip abduction 3-/5  Current: same as above     Patient will be able to perform TUG test in less than or equal to 12 seconds without AD in order to demonstrate improved stability during ambulation, thus reducing the patient's risk of falls. Eval: TUG Test: 17 secs with rollator, 17 secs with [facility] [place]  Current: same as eval     Patient will be able to perform at least 10 reps of sit <> stands without use of UE's during 30\" STS test to demonstrate improved LE strength and stability during this functional activity, thus reducing the patients risk of falls.   Eval: 30\" STS: 7 reps with use of moderate use of UE's to push from table  Current: same as eval    PLAN  []  Upgrade activities as tolerated     [x]  Continue plan of care  []  Update interventions per flow sheet       []  Discharge due to:_  []  Other:_      Destin Vazquez PT 7/5/2022  10:19 AM    Future Appointments   Date Time Provider Noah Gregory   7/5/2022  1:15 PM Toby Sieving, [address] [facility]   7/6/2022  2:00 PM Toby Sieving, [address] [facility]   7/8/2022  1:15 PM Yousuf Boston, [facility]   [facility]/11/2022  [facility]   7/13/2022  2:00 PM Toby Goncalves, [facility]   7/15/2022  1:15 PM Yousuf Boston, [facility]   7/18/2022  2:00 [facility]   7/20/2022  2:00 PM Toby Goncalves, [facility]   7/22/2022  1:15 PM Yousuf Boston, [facility]   7/25/2022  2:00 PM Arely [facility]   7/27/2022  2:45 PM Toby Goncalves, [address] [facility]   7/29/2022  9:30 AM Yousuf Boston, [facility]

## 2022-07-06 ENCOUNTER — HOSPITAL ENCOUNTER (OUTPATIENT)
Dept: PHYSICAL THERAPY | Age: 57
Discharge: HOME | End: 2022-07-06
Payer: MEDICAID

## 2022-07-06 PROCEDURE — 97110 THERAPEUTIC EXERCISES: CPT

## 2022-07-06 PROCEDURE — 97530 THERAPEUTIC ACTIVITIES: CPT

## 2022-07-06 PROCEDURE — 97112 NEUROMUSCULAR REEDUCATION: CPT

## 2022-07-06 NOTE — PROGRESS NOTES
PT DAILY TREATMENT NOTE    Patient Name: Collin Ponce  Date:2022  : 1965  [x]  Patient  Verified  Payor: BLUE CROSS MEDICAID / Plan: [facility] HEALTHKEEPERS PLUS / Product Type: Managed Care Medicaid /    In time:2:04  Out time:2:42  Total Treatment Time (min): 38  Total Timed Codes (min): 38  1:1 Treatment Time (MC/BCBS only): 38   Visit #: 3 of 20    Treatment Dx: Other abnormalities of gait and mobility [R26.89]    SUBJECTIVE  Pain Level (0-10 scale): 6/10  Any medication changes, allergies to medications, adverse drug reactions, diagnosis change, or new procedure performed?: [x] No    [] Yes (see summary sheet for update)  Subjective functional status/changes:   [] No changes reported  Patient states she felt achy in the evening after last session. She has a pain management appointment on . OBJECTIVE    20 min Therapeutic Exercise:  [x] See flow sheet :   Rationale: increase ROM, increase strength, improve coordination, improve balance and increase proprioception to improve the patients ability to restore PLOF. 8 min Therapeutic Activity:  [x]  See flow sheet :   Rationale: increase ROM, increase strength, improve coordination, improve balance and increase proprioception  to improve the patients ability to complete functional activities including stair negotiation and transitional movements. 10 min Neuromuscular Re-education:  [x]  See flow sheet :   Rationale: increase strength, improve coordination and increase proprioception  to improve the patients ability to improve abdominal and gluteal activation without compensation.           With   [x] TE   [] TA   [] neuro   [] other: Patient Education: [x] Review HEP    [] Progressed/Changed HEP based on:   [] positioning   [] body mechanics   [] transfers   [] heat/ice application    [] other:      Other Objective/Functional Measures: see goals     Pain Level (0-10 scale) post treatment: 0/10    ASSESSMENT/Changes in Function: Patient tolerated treatment session well today. Patient had no complaints with addition of step ups and increased repetitions for sit to stands to exercise program to accomplish improved gluteal strengthening. Patient reported increased soreness with exercises today but likely due to back to back PT sessions. Patient continues to make steady progress toward goals and would benefit from continued skilled PT intervention to address remaining deficits outlined in goals below. Patient will continue to benefit from skilled PT services to modify and progress therapeutic interventions, address functional mobility deficits, address ROM deficits, address strength deficits, analyze and address soft tissue restrictions, analyze and cue movement patterns, analyze and modify body mechanics/ergonomics, assess and modify postural abnormalities, address imbalance/dizziness and instruct in home and community integration to attain remaining goals. [x]  See Plan of Care  []  See progress note/recertification  []  See Discharge Summary         Progress towards goals / Updated goals:  Short Term Goals:    to be accomplished within 4 weeks:     Patient will report compliance with prescribed HEP(s) at least 1x/day in order to assist in maximizing therapeutic gains, reduce symptoms, and to progress towards overall prior function. Eval: HEP to be issued and reviewed with patient within 1-2 visits  Current: HEP issues and reviewed with patient including heel raise, toe raise, 3-way hip, LAQ, STS, bridges, supine clams and hip adduction 7/5/22     Patient will be able to perform TUG test in less than or equal to 12 seconds with WBQC in order to demonstrate improved stability during ambulation, thus reducing the patient's risk of falls.   Eval: TUG Test: 17 secs with rollator, 17 secs with [facility] AND [place]  Current: same as eval     Patient will be able to perform at least 10 reps of sit <> stands with minimal use of UE's to push from chair during 30\" STS test to demonstrate improved LE strength and stability during this functional activity, thus reducing the patients risk of falls. Eval: 30\" STS: 7 reps with use of moderate use of UE's to push from table  Current: 30\" STS at chair height of 18\" 4 reps with moderate use of UE's to push from chair; progressing 7/6/22       Long Term Goals:     to be accomplished within 8 weeks:     Patient will score at least 54 points on FOTO in order to maximize function and promote patient satisfaction with overall outcome. (Kassy Collin is an established functional score where 100 = no disability)  Eval: 46  Current: same as above     Patient will report at least a 25% reduction in pain/symptoms when performing ADLs/functional activities in order to improve overall tolerance to functional movements. Eval: pain ranges from 6-8/10 - bilateral hips, bilateral knees, and bilateral ankles  Current: same as above     Patient will demonstrate at least 4/5 strength of bilateral LE's in order to be able to safely perform functional activities and have a decreased risk of falls. Eval: Bilateral LE's grossly 4-/5 except for right knee extension/flexion 4/5, left hip abd 2-/5, right hip abduction 3-/5  Current: same as above     Patient will be able to perform TUG test in less than or equal to 12 seconds without AD in order to demonstrate improved stability during ambulation, thus reducing the patient's risk of falls. Eval: TUG Test: 17 secs with rollator, 17 secs with [facility] AND [place]  Current: same as eval     Patient will be able to perform at least 10 reps of sit <> stands without use of UE's during 30\" STS test to demonstrate improved LE strength and stability during this functional activity, thus reducing the patients risk of falls.   Eval: 30\" STS: 7 reps with use of moderate use of UE's to push from table  Current: same as eval    PLAN  []  Upgrade activities as tolerated     [x]  Continue plan of care  []  Update interventions per flow sheet       []  Discharge due to:_  []  Other:_      Yolanda Mendez, PT 7/6/2022  8:24 AM    Future Appointments   Date Time Provider Noah Gregory   7/6/2022  2:00 PM Alayna Zurita [address] [facility]   7/8/2022  1:15 PM Lizbeth Boston, [facility]   7/11/2022  2:00 PM Lizbeth Boston, [facility]   7/13/2022  2:00 PM Siva Mobley [facility]   7/15/2022  1:15 PM Lizbeth Boston, [facility]   7/18/2022  2:00 PM Ludy [facility]   7/20/2022  2:00 PM Siva Mobley [facility]   7/22/2022  1:15 PM Lizbeth Boston, [facility]   7/25/2022  2:00 PM Mercedes Borrego [facility]   7/27/2022  2:45 PM Alayna Zurita [facility]   7/29/2022  9:30 AM Lizbeth Boston, [facility]

## 2022-07-08 ENCOUNTER — APPOINTMENT (OUTPATIENT)
Dept: PHYSICAL THERAPY | Age: 57
End: 2022-07-08
Payer: MEDICAID

## 2022-07-11 ENCOUNTER — HOSPITAL ENCOUNTER (OUTPATIENT)
Dept: PHYSICAL THERAPY | Age: 57
Discharge: HOME | End: 2022-07-11
Payer: MEDICAID

## 2022-07-11 PROCEDURE — 97110 THERAPEUTIC EXERCISES: CPT

## 2022-07-11 PROCEDURE — 97112 NEUROMUSCULAR REEDUCATION: CPT

## 2022-07-11 PROCEDURE — 97530 THERAPEUTIC ACTIVITIES: CPT

## 2022-07-11 NOTE — PROGRESS NOTES
PT DAILY TREATMENT NOTE    Patient Name: Elena Alvarez  Date: 2022  : 1965  [x]  Patient  Verified  Payor: BLUE CROSS MEDICAID / Plan: [facility] HEALTHKEEPERS PLUS / Product Type: Managed Care Medicaid /    In Time: 2:02  Out Time: 2:40  Total Treatment Time (min): 38  Total Timed Codes (min): 38  1:1 Treatment Time ( [address] only): 38   Visit #:     Treatment Dx: Other abnormalities of gait and mobility [R26.89]    SUBJECTIVE  Pre-Treatment Pain Level (0-10 scale): 6    Any medication changes, allergies to medications, adverse drug reactions, diagnosis change, or new procedure performed?: [x] No    [] Yes (see summary sheet for update)    Subjective Functional Status/Changes:  [] No changes reported  \"I saw my doctor last week and they said I need my right hip replaced and then my left knee needs either a new replacement or tightening of the existing replacement because it's too loose. \"  Patient states she is going to get a second opinion and is awaiting to be scheduled. \"Everything on my right side hurts today, by the way. \"    OBJECTIVE    18 min Therapeutic Exercise:  [x] See flow sheet   Rationale: increase ROM, increase strength, and decrease pain to assist in improving patient's ability to perform functional activities and ADLs    10 min Therapeutic Activity:  [x] See flow sheet   Rationale: increase ROM, increase strength, and improve coordination to assist in improving patient's ability to perform functional activities and ADLs    10 min Neuromuscular Re-Education:  [x] See flow sheet   Rationale: improve coordination, improve balance/proprioception, improve posture, and improve core stabilization to assist in improving patient's ability to perform functional activities and ADLs as well as to improve core/quad stabilization during static/dynamic movements      With   [x] TE   [] TA   [] neuro   [] other: Patient Education: [x] Review HEP    [] Progressed/Changed HEP based on:   [] positioning   [] body mechanics   [] transfers   [] heat/ice application    [] other:      Other Objective/Functional Measures: see goals below     Pain Level (0-10 scale) Post Treatment: 6    ASSESSMENT/Changes in Function:  Patient responded well to today's treatment without any adverse reactions. Patient challenged with current exercises, but was able to increase to further reps (2x10) for standing 3 way hip as well as with sit <> stands. Patient does require therapist to raise table to 22\" from floor when doing sit <> stands as she cannot yet perform STS from a lower height. Continue per POC. Patient will continue to benefit from skilled PT services to further modify and progress therapeutic interventions, address functional mobility deficits, address ROM deficits, address strength deficits, analyze and address soft tissue restrictions, analyze and cue movement patterns, analyze and modify body mechanics/ergonomics, assess and modify postural abnormalities, and instruct in home and community integration to attain remaining goals. [x]  See Plan of Care  []  See Progress Note/Recertification  []  See Discharge Summary         Progress Towards Goals/Updated Goals:  Short Term Goals:    to be accomplished within 4 weeks:     Patient will report compliance with prescribed HEP(s) at least 1x/day in order to assist in maximizing therapeutic gains, reduce symptoms, and to progress towards overall prior function. Eval: HEP to be issued and reviewed with patient within 1-2 visits  Current: HEP issues and reviewed with patient including heel raise, toe raise, 3-way hip, LAQ, STS, bridges, supine clams and hip adduction 7/5/22     Patient will be able to perform TUG test in less than or equal to 12 seconds with WBQC in order to demonstrate improved stability during ambulation, thus reducing the patient's risk of falls.   Eval: TUG Test: 17 secs with rollator, 17 secs with [facility] AND [place]  Current: same as eval     Patient will be able to perform at least 10 reps of sit <> stands with minimal use of UE's to push from chair during 30\" STS test to demonstrate improved LE strength and stability during this functional activity, thus reducing the patients risk of falls. Eval: 30\" STS: 7 reps with use of moderate use of UE's to push from table  Current: 30\" STS at chair height of 18\" 4 reps with moderate use of UE's to push from chair; progressing 7/6/22        Long Term Goals:     to be accomplished within 8 weeks:     Patient will score at least 54 points on FOTO in order to maximize function and promote patient satisfaction with overall outcome. (Kassy Collin is an established functional score where 100 = no disability)  Eval: 46  Current: same as above     Patient will report at least a 25% reduction in pain/symptoms when performing ADLs/functional activities in order to improve overall tolerance to functional movements. Eval: pain ranges from 6-8/10 - bilateral hips, bilateral knees, and bilateral ankles  Current: same as above     Patient will demonstrate at least 4/5 strength of bilateral LE's in order to be able to safely perform functional activities and have a decreased risk of falls. Eval: Bilateral LE's grossly 4-/5 except for right knee extension/flexion 4/5, left hip abd 2-/5, right hip abduction 3-/5  Current: same as above     Patient will be able to perform TUG test in less than or equal to 12 seconds without AD in order to demonstrate improved stability during ambulation, thus reducing the patient's risk of falls. Eval: TUG Test: 17 secs with rollator, 17 secs with [facility] AND [place]  Current: same as eval     Patient will be able to perform at least 10 reps of sit <> stands without use of UE's during 30\" STS test to demonstrate improved LE strength and stability during this functional activity, thus reducing the patients risk of falls.   Eval: 30\" STS: 7 reps with use of moderate use of UE's to push from table  Current: same as eval      PLAN  []  Upgrade Activities as Tolerated     [x]  Continue Plan of Care  []  Update Interventions per Flow Sheet       []  Discharge Due To:_  []  Other:_      Jose D Johnson.  Ludy, PT, DPT, ATR  7/11/2022 2:29 PM    Future Appointments   Date Time Provider Noah Bri   7/13/2022  2:00 PM Sulema Barboza PT [facility]   7/15/2022  1:15 PM Jose D Boston, [facility]   7/18/2022  2:00 PM Harsh Fang [facility]   7/20/2022  2:00 PM Sulema Barboza, PT [facility]   7/22/2022  1:15 PM Jose D Boston, [facility]   7/25/2022  2:00 PM Harsh Fang [facility]   7/27/2022  2:45 PM Sulema Barboza, [facility]   7/29/2022  9:30 AM Jose D Boston, [facility]

## 2022-07-13 ENCOUNTER — HOSPITAL ENCOUNTER (OUTPATIENT)
Dept: PHYSICAL THERAPY | Age: 57
Discharge: HOME | End: 2022-07-13
Payer: MEDICAID

## 2022-07-13 PROCEDURE — 97112 NEUROMUSCULAR REEDUCATION: CPT

## 2022-07-13 PROCEDURE — 97110 THERAPEUTIC EXERCISES: CPT

## 2022-07-13 PROCEDURE — 97530 THERAPEUTIC ACTIVITIES: CPT

## 2022-07-13 NOTE — PROGRESS NOTES
PT DAILY TREATMENT NOTE    Patient Name: Manoj Schaeffer  Date:2022  : 1965  [x]  Patient  Verified  Payor: BLUE CROSS MEDICAID / Plan: [facility] HEALTHKEEPERS PLUS / Product Type: Managed Care Medicaid /    In time:2:00  Out time:2:45  Total Treatment Time (min): 45  Total Timed Codes (min): 45  1:1 Treatment Time (MC/BCBS only): 45   Visit #: 5 of 20    Treatment Dx: Other abnormalities of gait and mobility [R26.89]    SUBJECTIVE  Pain Level (0-10 scale): 5/10  Any medication changes, allergies to medications, adverse drug reactions, diagnosis change, or new procedure performed?: [x] No    [] Yes (see summary sheet for update)  Subjective functional status/changes:   [] No changes reported  Patient states her hip is hurting today. OBJECTIVE    15 min Therapeutic Exercise:  [x] See flow sheet :   Rationale: increase ROM, increase strength, improve coordination, improve balance and increase proprioception to improve the patients ability to restore PLOF    15 min Therapeutic Activity:  [x]  See flow sheet :   Rationale: increase strength, improve coordination, improve balance and increase proprioception  to improve the patients ability to complete functional activities including transitional movements, stair negotiation. 15 min Neuromuscular Re-education:  [x]  See flow sheet :   Rationale: increase strength, improve coordination, improve balance and increase proprioception  to improve the patients ability to balance with decreased base of support and improve abdominal activation with ADLs. With   [] TE   [] TA   [] neuro   [] other: Patient Education: [x] Review HEP    [] Progressed/Changed HEP based on:   [] positioning   [] body mechanics   [] transfers   [] heat/ice application    [] other:      Other Objective/Functional Measures: see goals     Pain Level (0-10 scale) post treatment: 5/10    ASSESSMENT/Changes in Function: Patient tolerated treatment session well today. Patient had no complaints with addition of increased resistance to clams, lateral step up and overs, and tandem stance to exercise program to accomplish increased gluteal strengthening and challenging balance strategies. Patient able to complete sit to stands at 22\". Patient continues to make progress toward goals and would benefit from continued skilled PT intervention to address remaining deficits outlined in goals below. Patient will continue to benefit from skilled PT services to modify and progress therapeutic interventions, address functional mobility deficits, address ROM deficits, address strength deficits, analyze and address soft tissue restrictions, analyze and cue movement patterns, analyze and modify body mechanics/ergonomics, assess and modify postural abnormalities, address imbalance/dizziness and instruct in home and community integration to attain remaining goals. [x]  See Plan of Care  []  See progress note/recertification  []  See Discharge Summary         Progress towards goals / Updated goals:  Short Term Goals:    to be accomplished within 4 weeks:     Patient will report compliance with prescribed HEP(s) at least 1x/day in order to assist in maximizing therapeutic gains, reduce symptoms, and to progress towards overall prior function. Eval: HEP to be issued and reviewed with patient within 1-2 visits  Current: HEP issues and reviewed with patient including heel raise, toe raise, 3-way hip, LAQ, STS, bridges, supine clams and hip adduction 7/5/22     Patient will be able to perform TUG test in less than or equal to 12 seconds with WBQC in order to demonstrate improved stability during ambulation, thus reducing the patient's risk of falls.   Eval: TUG Test: 17 secs with rollator, 17 secs with [facility] AND [place]  Current: same as eval     Patient will be able to perform at least 10 reps of sit <> stands with minimal use of UE's to push from chair during 30\" STS test to demonstrate improved LE strength and stability during this functional activity, thus reducing the patients risk of falls. Eval: 30\" STS: 7 reps with use of moderate use of UE's to push from table  Current: 30\" STS at chair height of 18\" 4 reps with moderate use of UE's to push from chair; progressing 7/6/22        Long Term Goals:     to be accomplished within 8 weeks:     Patient will score at least 54 points on FOTO in order to maximize function and promote patient satisfaction with overall outcome. (Jameson Repine is an established functional score where 100 = no disability)  Eval: 46  Current: 64 goal met 7/13/22     Patient will report at least a 25% reduction in pain/symptoms when performing ADLs/functional activities in order to improve overall tolerance to functional movements. Eval: pain ranges from 6-8/10 - bilateral hips, bilateral knees, and bilateral ankles  Current: same as above     Patient will demonstrate at least 4/5 strength of bilateral LE's in order to be able to safely perform functional activities and have a decreased risk of falls. Eval: Bilateral LE's grossly 4-/5 except for right knee extension/flexion 4/5, left hip abd 2-/5, right hip abduction 3-/5  Current: same as above     Patient will be able to perform TUG test in less than or equal to 12 seconds without AD in order to demonstrate improved stability during ambulation, thus reducing the patient's risk of falls. Eval: TUG Test: 17 secs with rollator, 17 secs with [facility] AND [place]  Current: same as eval     Patient will be able to perform at least 10 reps of sit <> stands without use of UE's during 30\" STS test to demonstrate improved LE strength and stability during this functional activity, thus reducing the patients risk of falls.   Eval: 30\" STS: 7 reps with use of moderate use of UE's to push from table  Current: same as eval    PLAN  []  Upgrade activities as tolerated     [x]  Continue plan of care  []  Update interventions per flow sheet       []  Discharge due to:_  []  Other:_ Kaylyn Cali PT 7/13/2022  7:59 AM    Future Appointments   Date Time Provider Noah Bri   7/13/2022  2:00 PM Lisbet Raines, PT [facility]   7/15/2022  1:15 PM Natalio Boston, PT [facility]   7/18/2022  2:00 PM Nubia Nam Done [facility]   7/20/2022  2:00 PM Lisbet Raines, PT [facility]   7/22/2022  1:15 PM Cyril Boston Done [facility]   7/25/2022  2:00 PM Nubia Nam Done [facility]   7/27/2022  2:45 PM Lisbet Raines, [facility] [facility]   7/29/2022  9:30 AM Natalio Boston, PT [facility]

## 2022-07-15 ENCOUNTER — HOSPITAL ENCOUNTER (OUTPATIENT)
Dept: PHYSICAL THERAPY | Age: 57
Discharge: HOME | End: 2022-07-15
Payer: MEDICAID

## 2022-07-15 PROCEDURE — 97110 THERAPEUTIC EXERCISES: CPT

## 2022-07-15 PROCEDURE — 97112 NEUROMUSCULAR REEDUCATION: CPT

## 2022-07-15 PROCEDURE — 97530 THERAPEUTIC ACTIVITIES: CPT

## 2022-07-15 NOTE — PROGRESS NOTES
PT DAILY TREATMENT NOTE    Patient Name: Argelia Donovan  Date: 7/15/2022  : 1965  [x]  Patient  Verified  Payor: BLUE CROSS MEDICAID / Plan: [facility] HEALTHKEEPERS PLUS / Product Type: Managed Care Medicaid /    In Time: 1:17  Out Time: 1:58  Total Treatment Time (min): 41  Total Timed Codes (min): 41  1:1 Treatment Time ( [address] only): 40   Visit #:     Treatment Dx: Other abnormalities of gait and mobility [R26.89]    SUBJECTIVE  Pre-Treatment Pain Level (0-10 scale): 5-8/10 among multiple body parts    Any medication changes, allergies to medications, adverse drug reactions, diagnosis change, or new procedure performed?: [x] No    [] Yes (see summary sheet for update)    Subjective Functional Status/Changes:  [] No changes reported  \"It's not a good day today - my whole body aches. It's mostly my left shoulder and right hip, but also my lower back on the right side. It started last night and continued over to today. \"  Patient states she sees pain management on Tuesday and then Dr. Valarie Mckeon (orthopedic surgeon at [facility]) on Wednesday. \"Dr. Valarie Mckeon is the one that's going to tell me what they're going to do about that left knee that's loose. \"  Patient states she is still having trouble getting in and out of chair, especially if it's a lower chair.     OBJECTIVE    21 min Therapeutic Exercise:  [x] See flow sheet   Rationale: increase ROM, increase strength, and decrease pain to assist in improving patient's ability to perform functional activities and ADLs    8 min Therapeutic Activity:  [x] See flow sheet   Rationale: increase ROM, increase strength, and improve coordination to assist in improving patient's ability to perform functional activities and ADLs    12 min Neuromuscular Re-Education:  [x] See flow sheet   Rationale: improve coordination, improve balance/proprioception, improve posture, and improve core stabilization to assist in improving patient's ability to perform functional activities and ADLs as well as to improve core stabilization during static/dynamic movements      With   [x] TE   [] TA   [] neuro   [] other: Patient Education: [x] Review HEP    [] Progressed/Changed HEP based on:   [] positioning   [] body mechanics   [] transfers   [] heat/ice application    [] other:      Other Objective/Functional Measures: see goals below     Pain Level (0-10 scale) Post Treatment: 5-8/10    ASSESSMENT/Changes in Function:  Patient responded well to today's treatment without any adverse reactions. Patient able to perform all prescribed exercises without any c/o increased pain outside of her tolerance. Patient appears to overall be improving with generalized strength and stamina, contributing to her making steady progress towards goals. Continue per POC. Patient will continue to benefit from skilled PT services to further modify and progress therapeutic interventions, address functional mobility deficits, address ROM deficits, address strength deficits, analyze and address soft tissue restrictions, analyze and cue movement patterns, analyze and modify body mechanics/ergonomics, assess and modify postural abnormalities, and instruct in home and community integration to attain remaining goals. [x]  See Plan of Care  []  See Progress Note/Recertification  []  See Discharge Summary         Progress Towards Goals/Updated Goals:    Short Term Goals:    to be accomplished within 4 weeks:     Patient will report compliance with prescribed HEP(s) at least 1x/day in order to assist in maximizing therapeutic gains, reduce symptoms, and to progress towards overall prior function.   Eval: HEP to be issued and reviewed with patient within 1-2 visits  Current: Patient reports daily compliance with her HEP.   7/15/22, met     Patient will be able to perform TUG test in less than or equal to 12 seconds with [facility] in order to demonstrate improved stability during ambulation, thus reducing the patient's risk of falls.  Eval: TUG Test: 17 secs with rollator, 17 secs with [facility]  Current: same as eval     Patient will be able to perform at least 10 reps of sit <> stands with minimal use of UE's to push from chair during 30\" STS test to demonstrate improved LE strength and stability during this functional activity, thus reducing the patients risk of falls. Eval: 30\" STS: 7 reps with use of moderate use of UE's to push from table  Current: 30\" STS at chair height of 18\" 4 reps with moderate use of UE's to push from chair; progressing 7/6/22        Long Term Goals:     to be accomplished within 8 weeks:     Patient will score at least 54 points on FOTO in order to maximize function and promote patient satisfaction with overall outcome. (Therese Dice is an established functional score where 100 = no disability)  Eval: 46  Current: 64   7/13/22, met     Patient will report at least a 25% reduction in pain/symptoms when performing ADLs/functional activities in order to improve overall tolerance to functional movements. Eval: pain ranges from 6-8/10 - bilateral hips, bilateral knees, and bilateral ankles  Current: same as above     Patient will demonstrate at least 4/5 strength of bilateral LE's in order to be able to safely perform functional activities and have a decreased risk of falls. Eval: Bilateral LE's grossly 4-/5 except for right knee extension/flexion 4/5, left hip abd 2-/5, right hip abduction 3-/5  Current: patient able to raise 50% of the way for a bridge, 2x10     7/15/22, in progress     Patient will be able to perform TUG test in less than or equal to 12 seconds without AD in order to demonstrate improved stability during ambulation, thus reducing the patient's risk of falls.   Eval: TUG Test: 17 secs with rollator, 17 secs with [facility]  Current: same as eval     Patient will be able to perform at least 10 reps of sit <> stands without use of UE's during 30\" STS test to demonstrate improved LE strength and stability during this functional activity, thus reducing the patients risk of falls. Eval: 30\" STS: 7 reps with use of moderate use of UE's to push from table  Current: same as eval    PLAN  []  Upgrade Activities as Tolerated     [x]  Continue Plan of Care  []  Update Interventions per Flow Sheet       []  Discharge Due To:_  []  Other:_      Dl Tom.  Ludy, SHI, DPT, ATR  7/15/2022 8:04 AM    Future Appointments   Date Time Provider Noah Gregory   7/15/2022  1:15 PM Shaan Tom, SHI [facility]   7/18/2022  2:00 PM Shaan [facility]   7/20/2022  2:00 PM Wilton Melendez, [facility]   7/22/2022  1:15 PM Dl Boston, PT [facility]   [facility]/25[facility]2022  [facility]   7/27/2022  2:45 PM Wilton Melendez, [address] [place] [address] [facility]   7/29/2022  9:30 AM Dl Boston, SHI [facility]

## 2022-07-18 ENCOUNTER — HOSPITAL ENCOUNTER (OUTPATIENT)
Dept: PHYSICAL THERAPY | Age: 57
Discharge: HOME | End: 2022-07-18
Payer: MEDICAID

## 2022-07-18 PROCEDURE — 97110 THERAPEUTIC EXERCISES: CPT

## 2022-07-18 PROCEDURE — 97112 NEUROMUSCULAR REEDUCATION: CPT

## 2022-07-18 PROCEDURE — 97530 THERAPEUTIC ACTIVITIES: CPT

## 2022-07-18 NOTE — PROGRESS NOTES
PT DAILY TREATMENT NOTE    Patient Name: Loni Hebert  Date: 2022  : 1965  [x]  Patient  Verified  Payor: BLUE CROSS MEDICAID / Plan: [facility] HEALTHKEEPERS PLUS / Product Type: Managed Care Medicaid /    In Time: 2:05  Out Time: 2:43  \Total Treatment Time (min): 38  Total Timed Codes (min): 38  1:1 Treatment Time ([facility] only): 38   Visit #:     Treatment Dx: Other abnormalities of gait and mobility [R26.89]    SUBJECTIVE  Pre-Treatment Pain Level (0-10 scale): 8/10    Any medication changes, allergies to medications, adverse drug reactions, diagnosis change, or new procedure performed?: [x] No    [] Yes (see summary sheet for update)    Subjective Functional Status/Changes:  [] No changes reported  \"My pain is about an 8 today, but I refuse to stop. I gotta keep moving. \"  Patient states she continues to have difficulty getting and out of a chair. \"I also feel like my legs are still weak. \"    OBJECTIVE    20 min Therapeutic Exercise:  [x] See flow sheet   Rationale: increase ROM, increase strength, and decrease pain to assist in improving patient's ability to perform functional activities and ADLs    10 min Therapeutic Activity:  [x] See flow sheet   Rationale: increase ROM, increase strength, and improve coordination to assist in improving patient's ability to perform functional activities and ADLs    8 min Neuromuscular Re-Education:  [x] See flow sheet   Rationale: improve coordination, improve balance/proprioception, improve posture, and improve core stabilization to assist in improving patient's ability to perform functional activities and ADLs as well as to improve core stabilization during static/dynamic movements      With   [x] TE   [] TA   [] neuro   [] other: Patient Education: [x] Review HEP    [] Progressed/Changed HEP based on:   [] positioning   [] body mechanics   [] transfers   [] heat/ice application    [] other:      Other Objective/Functional Measures: see goals below     Pain Level (0-10 scale) Post Treatment: 8/10    ASSESSMENT/Changes in Function:  Patient responded well to today's treatment without any adverse reactions. Patient has participated in 7 visits of skilled PT over the past 30 days and is making slow, but gradual progress towards goals. She has met her FOTO goal, suggesting improving function and patient satisfaction. She has also made some mild improvements in the strength of bilateral LE's, though does remain significantly weak overall which continues to contribute towards increaseing her risk of falls and decreasing functional capabilities. Patient would continue to benefit from skilled PT services to further improve bilateral LE strength, core stabilization, gait, balance, and improve overall function, while reducing her risk of falls. Patient will continue to benefit from skilled PT services to further modify and progress therapeutic interventions, address functional mobility deficits, address ROM deficits, address strength deficits, analyze and address soft tissue restrictions, analyze and cue movement patterns, analyze and modify body mechanics/ergonomics, assess and modify postural abnormalities, and instruct in home and community integration to attain remaining goals. [x]  See Plan of Care  [x]  See Progress Note/Recertification  []  See Discharge Summary         Progress Towards Goals/Updated Goals:    Short Term Goals:    to be accomplished within 4 weeks:     Patient will report compliance with prescribed HEP(s) at least 1x/day in order to assist in maximizing therapeutic gains, reduce symptoms, and to progress towards overall prior function.   Eval: HEP to be issued and reviewed with patient within 1-2 visits  Current: Patient reports daily compliance with her HEP.   7/15/22, met     Patient will be able to perform TUG test in less than or equal to 12 seconds with [facility] in order to demonstrate improved stability during ambulation, thus reducing the patient's risk of falls. Eval: TUG Test: 17 secs with rollator, 17 secs with [facility] AND [place]  Current: TUG Test: 17 secs without AD   7/18/22, in progress     Patient will be able to perform at least 10 reps of sit <> stands with minimal use of UE's to push from chair during 30\" STS test to demonstrate improved LE strength and stability during this functional activity, thus reducing the patients risk of falls. Eval: 30\" STS: 7 reps with use of moderate use of UE's to push from table  Current: 30\" STS at chair height of 18\" 4 reps with moderate use of UE's to push from chair  7/6/22    Current: 30\" STS at chair height of 20\":   9 reps without use of UE's   7/18/22, in progress     Long Term Goals:     to be accomplished within 8 weeks:     Patient will score at least 54 points on FOTO in order to maximize function and promote patient satisfaction with overall outcome. (Judeth Barrette is an established functional score where 100 = no disability)  Eval: 46  Current: 64   7/13/22, met     Patient will report at least a 25% reduction in pain/symptoms when performing ADLs/functional activities in order to improve overall tolerance to functional movements. Eval: pain ranges from 6-8/10 - bilateral hips, bilateral knees, and bilateral ankles  Current: pain continues to range from 5-8/10 in bilateral hips/knees/ankles    7/18/22, in progress     Patient will demonstrate at least 4/5 strength of bilateral LE's in order to be able to safely perform functional activities and have a decreased risk of falls.   Eval: Bilateral LE's grossly 4-/5 except for right knee extension/flexion 4/5, left hip abd 2-/5, right hip abduction 3-/5  Current: Bilateral LE's grossly 4/5 except for right knee extension 4+/5, right knee flexion 4/5, bilateral hip abduction 3-/5    7/18/22, in progress     Patient will be able to perform TUG test in less than or equal to 12 seconds without AD in order to demonstrate improved stability during ambulation, thus reducing the patient's risk of falls. Eval: TUG Test: 17 secs with rollator, 17 secs with [facility] AND [place]  Current: TUG Test: 17 secs without AD   7/18/22, in progress     Patient will be able to perform at least 10 reps of sit <> stands without use of UE's during 30\" STS test to demonstrate improved LE strength and stability during this functional activity, thus reducing the patients risk of falls. Eval: 30\" STS: 7 reps with use of moderate use of UE's to push from table  Current: 30\" STS at chair height of 18\" 4 reps with moderate use of UE's to push from chair  7/6/22   Current: 30\" STS at chair height of 20\":   9 reps without use of UE's   7/18/22, in progress    PLAN  []  Upgrade Activities as Tolerated     [x]  Continue Plan of Care  []  Update Interventions per Flow Sheet       []  Discharge Due To:_  []  Other:_      Ary Stark.  Ludy, SHI, DPT, ATR  7/18/2022 8:05 AM    Future Appointments   Date Time Provider Noah Bri   7/18/2022  2:00 PM [facility]   [facility]  [facility]   7/25/2022  2:00 PM Kang [facility]   7/27/2022  2:45 PM Jose Cruz Galindo, [facility]   7/29/2022  9:30 AM Ary Boston PT [facility]

## 2022-07-20 ENCOUNTER — HOSPITAL ENCOUNTER (OUTPATIENT)
Dept: PHYSICAL THERAPY | Age: 57
Discharge: HOME | End: 2022-07-20
Payer: MEDICAID

## 2022-07-20 PROCEDURE — 97112 NEUROMUSCULAR REEDUCATION: CPT

## 2022-07-20 PROCEDURE — 97530 THERAPEUTIC ACTIVITIES: CPT

## 2022-07-20 PROCEDURE — 97110 THERAPEUTIC EXERCISES: CPT

## 2022-07-20 NOTE — PROGRESS NOTES
PT DAILY TREATMENT NOTE    Patient Name: Geovanna Fernandez  Date: 2022  : 1965  [x]  Patient  Verified  Payor: BLUE CROSS MEDICAID / Plan: [facility] HEALTHKEEPERS PLUS / Product Type: Managed Care Medicaid /    In Time: 10:17  Out Time: 10:57  Total Treatment Time (min): 40  Total Timed Codes (min): 40  1:1 Treatment Time ([facility] only): 38   Visit #:     Treatment Dx: Other abnormalities of gait and mobility [R26.89]    SUBJECTIVE  Pre-Treatment Pain Level (0-10 scale): 8/10    Any medication changes, allergies to medications, adverse drug reactions, diagnosis change, or new procedure performed?: [x] No    [] Yes (see summary sheet for update)    Subjective Functional Status/Changes:  [] No changes reported  Patient states she saw her pain management doctor and she's been prescribed Duloxetine 30mg 1x/day to see if that helps with her pain. She goes to ortho doctor later today. Patient states she has to do deep cleaning of her house this coming weekend (baseboards, bathrooms, etc).     OBJECTIVE    18 min Therapeutic Exercise:  [x] See flow sheet   Rationale: increase ROM, increase strength, and decrease pain to assist in improving patient's ability to perform functional activities and ADLs    10 min Therapeutic Activity:  [x] See flow sheet   Rationale: increase ROM, increase strength, and improve coordination to assist in improving patient's ability to perform functional activities and ADLs    12 min Neuromuscular Re-Education:  [x] See flow sheet   Rationale: improve coordination, improve balance/proprioception, improve posture, and improve core stabilization to assist in improving patient's ability to perform functional activities and ADLs as well as to improve core stabilization during static/dynamic movements      With   [x] TE   [] TA   [] neuro   [] other: Patient Education: [x] Review HEP    [] Progressed/Changed HEP based on:   [] positioning   [] body mechanics   [] transfers   [] heat/ice application    [] other:      Other Objective/Functional Measures: see goals below     Pain Level (0-10 scale) Post Treatment: 8/10    ASSESSMENT/Changes in Function:  Patient responded well to today's treatment without any adverse reactions. Patient able to advance to using the [employer]ire[employer] pad for her tandem stance balance exercise today, demonstrating improving balance overall. She remains challenged with sit <> stands secondary to decreased strength, but also a combination of bilateral hip/knee pain, requiring to raise the table up to 22\" for her to complete the task with better control. Patient very challenged with SLR flexion of her right LE secondary to c/o right sided groin pain, but not outside of her pain tolerance for exercise. Patient is making slow, but gradual progress towards goals, but remains limited in function secondary to c/o higher levels of pain in multiple joints/body parts. Patient will continue to benefit from skilled PT services to further modify and progress therapeutic interventions, address functional mobility deficits, address ROM deficits, address strength deficits, analyze and address soft tissue restrictions, analyze and cue movement patterns, analyze and modify body mechanics/ergonomics, assess and modify postural abnormalities, and instruct in home and community integration to attain remaining goals. [x]  See Plan of Care  []  See Progress Note/Recertification  []  See Discharge Summary         Progress Towards Goals/Updated Goals:    Short Term Goals:    to be accomplished within 4 weeks:     Patient will report compliance with prescribed HEP(s) at least 1x/day in order to assist in maximizing therapeutic gains, reduce symptoms, and to progress towards overall prior function.   Eval: HEP to be issued and reviewed with patient within 1-2 visits  Current: Patient reports daily compliance with her HEP.   7/15/22, met     Patient will be able to perform TUG test in less than or equal to 12 seconds with [facility] in order to demonstrate improved stability during ambulation, thus reducing the patient's risk of falls. Eval: TUG Test: 17 secs with rollator, 17 secs with [facility]  Current: TUG Test: 17 secs without AD   7/18/22, in progress     Patient will be able to perform at least 10 reps of sit <> stands with minimal use of UE's to push from chair during 30\" STS test to demonstrate improved LE strength and stability during this functional activity, thus reducing the patients risk of falls. Eval: 30\" STS: 7 reps with use of moderate use of UE's to push from table  Current: 30\" STS at chair height of 18\" 4 reps with moderate use of UE's to push from chair  7/6/22               Current: 30\" STS at chair height of 20\":   9 reps without use of UE's   7/18/22, in progress     Long Term Goals:     to be accomplished within 8 weeks:     Patient will score at least 54 points on FOTO in order to maximize function and promote patient satisfaction with overall outcome. (Laverda Pacer is an established functional score where 100 = no disability)  Eval: 46  Current: 64   7/13/22, met     Patient will report at least a 25% reduction in pain/symptoms when performing ADLs/functional activities in order to improve overall tolerance to functional movements. Eval: pain ranges from 6-8/10 - bilateral hips, bilateral knees, and bilateral ankles  Current: pain continues to range from 5-8/10 in bilateral hips/knees/ankles    7/18/22, in progress     Patient will demonstrate at least 4/5 strength of bilateral LE's in order to be able to safely perform functional activities and have a decreased risk of falls.   Eval: Bilateral LE's grossly 4-/5 except for right knee extension/flexion 4/5, left hip abd 2-/5, right hip abduction 3-/5  Current: Bilateral LE's grossly 4/5 except for right knee extension 4+/5, right knee flexion 4/5, bilateral hip abduction 3-/5    7/18/22, in progress     Patient will be able to perform TUG test in less than or equal to 12 seconds without AD in order to demonstrate improved stability during ambulation, thus reducing the patient's risk of falls. Eval: TUG Test: 17 secs with rollator, 17 secs with [facility] AND [place]  Current: TUG Test: 17 secs without AD   7/18/22, in progress     Patient will be able to perform at least 10 reps of sit <> stands without use of UE's during 30\" STS test to demonstrate improved LE strength and stability during this functional activity, thus reducing the patients risk of falls. Eval: 30\" STS: 7 reps with use of moderate use of UE's to push from table  Current: 30\" STS at chair height of 18\" 4 reps with moderate use of UE's to push from chair  7/6/22   Current: 30\" STS at chair height of 20\":   9 reps without use of UE's   7/18/22, in progress      PLAN  []  Upgrade Activities as Tolerated     [x]  Continue Plan of Care  []  Update Interventions per Flow Sheet       []  Discharge Due To:_  []  Other:_      Armida Boeck.  Ludy, SHI, DPT, ATR  7/20/2022 9:44 AM    Future Appointments   Date Time Provider Noah Gregory   7/20/2022 10:15 AM Hollowell, Armida Boeck, PT [facility]   7/22/2022  1:15 PM Hollowell, Armida Boeck, PT [facility]   7/25/2022  2:00 PM Nile Boston [facility]   7/27/2022  2:45 PM Jose Ragland, [address] [place] [address] [facility]   7/29/2022  9:30 AM Hollowell, Armida Boeck, PT [facility]   8/1/2022 12:30 PM Nile Boston [facility]   8/3/2022  1:15 PM Jose Ragland PT [facility]   8/5/2022  1:15 PM Hollowell, Armida Boeck, PT [facility]   8/8/2022  1:15 PM Nile Boston [facility]   8/10/2022  1:15 PM Jose Ragland, [facility]   8/12/2022  1:15 PM Hollowell, Armida Boeck, SHI [facility]

## 2022-07-22 ENCOUNTER — APPOINTMENT (OUTPATIENT)
Dept: PHYSICAL THERAPY | Age: 57
End: 2022-07-22
Payer: MEDICAID

## 2022-07-25 ENCOUNTER — HOSPITAL ENCOUNTER (OUTPATIENT)
Dept: PHYSICAL THERAPY | Age: 57
Discharge: HOME | End: 2022-07-25
Payer: MEDICAID

## 2022-07-25 PROCEDURE — 97530 THERAPEUTIC ACTIVITIES: CPT

## 2022-07-25 PROCEDURE — 97110 THERAPEUTIC EXERCISES: CPT

## 2022-07-25 PROCEDURE — 97112 NEUROMUSCULAR REEDUCATION: CPT

## 2022-07-25 NOTE — PROGRESS NOTES
PT DAILY TREATMENT NOTE    Patient Name: Eduard Estrada  Date:2022  : 1965  [x]  Patient  Verified  Payor: BLUE CROSS MEDICAID / Plan: [facility] HEALTHKEEPERS PLUS / Product Type: Managed Care Medicaid /    In time:1:15  Out time:2:01  Total Treatment Time (min): 46  Total Timed Codes (min): 46  1:1 Treatment Time (MC/BCBS only): 46   Visit #: 9 of 20    Treatment Dx: Other abnormalities of gait and mobility [R26.89]    SUBJECTIVE  Pain Level (0-10 scale): 8/10  Any medication changes, allergies to medications, adverse drug reactions, diagnosis change, or new procedure performed?: [x] No    [] Yes (see summary sheet for update)  Subjective functional status/changes:   [] No changes reported  Patient states her surgeon would like to do a left knee replacement and possible replacement or revison of the right knee. She states she is waiting to see if an infection in the knee and for her iron to increase before surgery is scheduled. OBJECTIVE      20 min Therapeutic Exercise:  [x] See flow sheet :   Rationale: increase ROM, increase strength, improve coordination, improve balance, and increase proprioception to improve the patients ability to restore PLOF. 17 min Therapeutic Activity:  [x]  See flow sheet :   Rationale: increase strength, improve coordination, improve balance, and increase proprioception  to improve the patients ability to complete functional activities, transitional movements, and step negotiation. 8 min Neuromuscular Re-education:  [x]  See flow sheet :   Rationale: increase strength, improve coordination, improve balance, and increase proprioception  to improve the patients ability to activate abdominal and gluteal with daily activities.            With   [x] TE   [x] TA   [x] neuro   [] other: Patient Education: [x] Review HEP    [] Progressed/Changed HEP based on:   [] positioning   [] body mechanics   [] transfers   [] heat/ice application    [] other: Other Objective/Functional Measures: see goals     Pain Level (0-10 scale) post treatment: 8/10    ASSESSMENT/Changes in Function: Patient tolerated treatment session well today. Exercise program remained the same today based on patient's fatigue and increased pain. Updated HEP with standing, seated, and supine exercises for hip and knee strengthening. Will continue to progress exercises per patient tolerance as she prepares for possible bilateral knee replacements or revision. Patient continues to make progress toward goals and would benefit from continued skilled PT intervention to address remaining deficits outlined in goals below. Patient will continue to benefit from skilled PT services to modify and progress therapeutic interventions, address functional mobility deficits, address ROM deficits, address strength deficits, analyze and address soft tissue restrictions, analyze and cue movement patterns, analyze and modify body mechanics/ergonomics, assess and modify postural abnormalities, address imbalance/dizziness, and instruct in home and community integration to attain remaining goals. [x]  See Plan of Care  []  See progress note/recertification  []  See Discharge Summary         Progress towards goals / Updated goals:  Short Term Goals:    to be accomplished within 4 weeks:     Patient will report compliance with prescribed HEP(s) at least 1x/day in order to assist in maximizing therapeutic gains, reduce symptoms, and to progress towards overall prior function. Eval: HEP to be issued and reviewed with patient within 1-2 visits  Current: Patient reports daily compliance with her HEP. HEP updated with supine, seated, and standing exercises. 7/25/22, met     Patient will be able to perform TUG test in less than or equal to 12 seconds with [facility] AND [place] in order to demonstrate improved stability during ambulation, thus reducing the patient's risk of falls.   Eval: TUG Test: 17 secs with rollator, 17 secs with [facility]  Current: TUG Test: 17 secs without AD   7/18/22, in progress     Patient will be able to perform at least 10 reps of sit <> stands with minimal use of UE's to push from chair during 30\" STS test to demonstrate improved LE strength and stability during this functional activity, thus reducing the patients risk of falls. Eval: 30\" STS: 7 reps with use of moderate use of UE's to push from table  Current: 30\" STS at chair height of 18\" 4 reps with moderate use of UE's to push from chair  7/6/22              Current: 30\" STS at chair height of 20\":   9 reps without use of UE's   7/18/22, in progress     Long Term Goals:     to be accomplished within 8 weeks:     Patient will score at least 54 points on FOTO in order to maximize function and promote patient satisfaction with overall outcome. (Heath Chance is an established functional score where 100 = no disability)  Eval: 46  Current: 64   7/13/22, met     Patient will report at least a 25% reduction in pain/symptoms when performing ADLs/functional activities in order to improve overall tolerance to functional movements. Eval: pain ranges from 6-8/10 - bilateral hips, bilateral knees, and bilateral ankles  Current: pain continues to range from 5-8/10 in bilateral hips/knees/ankles    7/18/22, in progress     Patient will demonstrate at least 4/5 strength of bilateral LE's in order to be able to safely perform functional activities and have a decreased risk of falls. Eval: Bilateral LE's grossly 4-/5 except for right knee extension/flexion 4/5, left hip abd 2-/5, right hip abduction 3-/5  Current: Bilateral LE's grossly 4/5 except for right knee extension 4+/5, right knee flexion 4/5, bilateral hip abduction 3-/5    7/18/22, in progress     Patient will be able to perform TUG test in less than or equal to 12 seconds without AD in order to demonstrate improved stability during ambulation, thus reducing the patient's risk of falls.   Eval: TUG Test: 17 secs with rollator, 17 secs with [facility] AND [place]  Current: TUG Test: 17 secs without AD   7/18/22, in progress     Patient will be able to perform at least 10 reps of sit <> stands without use of UE's during 30\" STS test to demonstrate improved LE strength and stability during this functional activity, thus reducing the patients risk of falls.   Eval: 30\" STS: 7 reps with use of moderate use of UE's to push from table  Current: 30\" STS at chair height of 18\" 4 reps with moderate use of UE's to push from chair  7/6/22   Current: 30\" STS at chair height of 20\":   9 reps without use of UE's   7/18/22, in progress    PLAN  []  Upgrade activities as tolerated     [x]  Continue plan of care  []  Update interventions per flow sheet       []  Discharge due to:_  []  Other:_      Ronn Whitley, PT 7/25/2022  1:21 PM    Future Appointments   Date Time Provider Noah Gregory   7/27/2022  2:45 PM Ciro Mchugh, [address] THE [facility]   7/29/2022  9:30 AM Severo Freedman [facility]   8/1/2022 12:30 PM Lyndon Boston [facility]   8/3/2022  1:15 PM Ciro Mchugh, [address] THE [facility]   8/5/2022  1:15 PM Phylicia Boston [facility]   8/8/2022  1:15 PM Raji Haney [facility]   8/10/2022  1:15 PM Ciro Mchugh, [address] THE [facility]   8/12/2022  1:15 PM Phylicia Boston [facility]

## 2022-07-27 ENCOUNTER — HOSPITAL ENCOUNTER (OUTPATIENT)
Dept: PHYSICAL THERAPY | Age: 57
Discharge: HOME | End: 2022-07-27
Payer: MEDICAID

## 2022-07-27 PROCEDURE — 97112 NEUROMUSCULAR REEDUCATION: CPT

## 2022-07-27 PROCEDURE — 97110 THERAPEUTIC EXERCISES: CPT

## 2022-07-27 PROCEDURE — 97530 THERAPEUTIC ACTIVITIES: CPT

## 2022-07-27 NOTE — PROGRESS NOTES
PT DAILY TREATMENT NOTE    Patient Name: Parish Expose  Date:2022  : 1965  [x]  Patient  Verified  Payor: BLUE CROSS MEDICAID / Plan: [address] [place] [address] / Product Type: Managed Care Medicaid /    In time:2:46  Out time:3:26  Total Treatment Time (min): Total Timed Codes (min): 40  1:1 Treatment Time (MC/BCBS only): 40   Visit #: 10 of 20    Treatment Dx: Other abnormalities of gait and mobility [R26.89]    SUBJECTIVE  Pain Level (0-10 scale): 5/10  Any medication changes, allergies to medications, adverse drug reactions, diagnosis change, or new procedure performed?: [x] No    [] Yes (see summary sheet for update)  Subjective functional status/changes:   [] No changes reported  Patient states her hip is feeling better today. She states her iron levels are increasing. She has been having increased dizziness since starting a new medicine. OBJECTIVE    15 min Therapeutic Exercise:  [x] See flow sheet :   Rationale: increase ROM, increase strength, improve coordination, improve balance, and increase proprioception to improve the patients ability to restore PLOF. 15 min Therapeutic Activity:  [x]  See flow sheet :   Rationale: increase strength, improve coordination, improve balance, and increase proprioception  to improve the patients ability to complete functional activities, negotiate stairs. 10 min Neuromuscular Re-education:  [x]  See flow sheet :   Rationale: improve coordination, improve balance, and increase proprioception  to improve the patients ability to activate gluteals with ADLs.            With   [x] TE   [] TA   [] neuro   [] other: Patient Education: [x] Review HEP    [] Progressed/Changed HEP based on:   [] positioning   [] body mechanics   [] transfers   [] heat/ice application    [] other:      Other Objective/Functional Measures: see goals     Pain Level (0-10 scale) post treatment: 5/10    ASSESSMENT/Changes in Function: Patient tolerated treatment session well today. Patient had no complaints with addition of resistance band to standing 3 way hip to exercise program to accomplish increased gluteal and quadriceps strengthening. Completed step ups instead of step up and overs secondary to patient's dizziness. Patient states she is going to stop taking one of her new pain medications as she thinks it is increasing dizziness. Advised her to call her doctor. Patient continues to make steady progress toward goals and would benefit from continued skilled PT intervention to address remaining deficits outlined in goals below. Patient will continue to benefit from skilled PT services to modify and progress therapeutic interventions, address functional mobility deficits, address ROM deficits, address strength deficits, analyze and address soft tissue restrictions, analyze and cue movement patterns, analyze and modify body mechanics/ergonomics, assess and modify postural abnormalities, address imbalance/dizziness, and instruct in home and community integration to attain remaining goals. [x]  See Plan of Care  []  See progress note/recertification  []  See Discharge Summary         Progress towards goals / Updated goals:  Short Term Goals:    to be accomplished within 4 weeks:     Patient will report compliance with prescribed HEP(s) at least 1x/day in order to assist in maximizing therapeutic gains, reduce symptoms, and to progress towards overall prior function. Eval: HEP to be issued and reviewed with patient within 1-2 visits  Current: Patient reports daily compliance with her HEP. HEP updated with supine, seated, and standing exercises. 7/25/22, met     Patient will be able to perform TUG test in less than or equal to 12 seconds with [facility] in order to demonstrate improved stability during ambulation, thus reducing the patient's risk of falls.   Eval: TUG Test: 17 secs with rollator, 17 secs with [facility]  Current: TUG Test: 17 secs without AD   7/18/22, in progress     Patient will be able to perform at least 10 reps of sit <> stands with minimal use of UE's to push from chair during 30\" STS test to demonstrate improved LE strength and stability during this functional activity, thus reducing the patients risk of falls. Eval: 30\" STS: 7 reps with use of moderate use of UE's to push from table  Current: 30\" STS at chair height of 18\" 4 reps with moderate use of UE's to push from chair  7/6/22              Current: 30\" STS at chair height of 20\":   9 reps without use of UE's   7/18/22, in progress     Long Term Goals:     to be accomplished within 8 weeks:     Patient will score at least 54 points on FOTO in order to maximize function and promote patient satisfaction with overall outcome. (Janeann Hires is an established functional score where 100 = no disability)  Eval: 46  Current: 64   7/13/22, met     Patient will report at least a 25% reduction in pain/symptoms when performing ADLs/functional activities in order to improve overall tolerance to functional movements. Eval: pain ranges from 6-8/10 - bilateral hips, bilateral knees, and bilateral ankles  Current: pain continues to range from 5-8/10 in bilateral hips/knees/ankles; however patient reports improvement in hip pain duration    7/27/22, in progress     Patient will demonstrate at least 4/5 strength of bilateral LE's in order to be able to safely perform functional activities and have a decreased risk of falls. Eval: Bilateral LE's grossly 4-/5 except for right knee extension/flexion 4/5, left hip abd 2-/5, right hip abduction 3-/5  Current: Bilateral LE's grossly 4/5 except for right knee extension 4+/5, right knee flexion 4/5, bilateral hip abduction 3-/5    7/18/22, in progress     Patient will be able to perform TUG test in less than or equal to 12 seconds without AD in order to demonstrate improved stability during ambulation, thus reducing the patient's risk of falls.   Eval: TUG Test: 17 secs with rollator, 17 secs with [facility] AND [place]  Current: TUG Test: 17 secs without AD   7/18/22, in progress     Patient will be able to perform at least 10 reps of sit <> stands without use of UE's during 30\" STS test to demonstrate improved LE strength and stability during this functional activity, thus reducing the patients risk of falls.   Eval: 30\" STS: 7 reps with use of moderate use of UE's to push from table  Current: 30\" STS at chair height of 18\" 4 reps with moderate use of UE's to push from chair  7/6/22   Current: 30\" STS at chair height of 20\":   9 reps without use of UE's   7/18/22, in progress    PLAN  []  Upgrade activities as tolerated     [x]  Continue plan of care  []  Update interventions per flow sheet       []  Discharge due to:_  []  Other:_      Shanda Avery, PT 7/27/2022  11:41 AM    Future Appointments   Date Time Provider Noah Gregory   7/27/2022  2:45 PM Ronn Rosa, [address] [facility]   7/29/2022  9:30 AM Alma Hudson [facility]   8/1/2022 12:30 PM [facility]   8/3/2022  1:15 PM Ronn Rosa, [address] [facility]   8/5/2022  1:15 PM Brandt [place], [facility]   8/8/2022  1:15 PM Willa Lee [facility]   8/10/2022  1:15 PM Ronn Rosa, [address] [facility]   8/12/2022  1:15 PM Brandt Boston, [facility]

## 2022-07-29 ENCOUNTER — TELEPHONE (OUTPATIENT)
Dept: PHYSICAL THERAPY | Age: 57
End: 2022-07-29

## 2022-07-29 ENCOUNTER — APPOINTMENT (OUTPATIENT)
Dept: PHYSICAL THERAPY | Age: 57
End: 2022-07-29
Payer: MEDICAID

## 2022-08-01 ENCOUNTER — TELEPHONE (OUTPATIENT)
Dept: PHYSICAL THERAPY | Age: 57
End: 2022-08-01

## 2022-08-03 ENCOUNTER — HOSPITAL ENCOUNTER (OUTPATIENT)
Dept: PHYSICAL THERAPY | Age: 57
Discharge: HOME | End: 2022-08-03
Payer: MEDICAID

## 2022-08-03 PROCEDURE — 97530 THERAPEUTIC ACTIVITIES: CPT

## 2022-08-03 PROCEDURE — 97112 NEUROMUSCULAR REEDUCATION: CPT

## 2022-08-03 PROCEDURE — 97110 THERAPEUTIC EXERCISES: CPT

## 2022-08-03 NOTE — PROGRESS NOTES
PT DAILY TREATMENT NOTE    Patient Name: Eudard Estrada  Date:8/3/2022  : 1965  [x]  Patient  Verified  Payor: BLUE CROSS MEDICAID / Plan: [facility] HEALTHKEEPERS PLUS / Product Type: Managed Care Medicaid /    In time:1:15  Out time:2:00  Total Treatment Time (min): 45  Total Timed Codes (min): 45  1:1 Treatment Time (MC/BCBS only): 45   Visit #: 11 of 20    Treatment Dx: Other abnormalities of gait and mobility [R26.89]    SUBJECTIVE  Pain Level (0-10 scale): 5/10  Any medication changes, allergies to medications, adverse drug reactions, diagnosis change, or new procedure performed?: [x] No    [] Yes (see summary sheet for update)  Subjective functional status/changes:   [] No changes reported  Patient states she woke up Friday morning and could not put weight through her left leg. She went to the ER on Friday to check for blood clots and was told it was arthritis. She was using crutches through Monday. OBJECTIVE    20 min Therapeutic Exercise:  [x] See flow sheet :   Rationale: increase ROM, increase strength, improve coordination, improve balance, and increase proprioception to improve the patients ability to restore PLOF. 15 min Therapeutic Activity:  [x]  See flow sheet :   Rationale: increase ROM, increase strength, improve coordination, improve balance, and increase proprioception  to improve the patients ability to complete transitional movements, functional activities. 10 min Neuromuscular Re-education:  [x]  See flow sheet :   Rationale: increase ROM, increase strength, improve coordination, improve balance, and increase proprioception  to improve the patients ability to negotiate uneven surfaces, improve patient's abdominal activation and proprioception.         With   [x] TE   [] TA   [] neuro   [] other: Patient Education: [x] Review HEP    [] Progressed/Changed HEP based on:   [] positioning   [] body mechanics   [] transfers   [] heat/ice application    [] other: Other Objective/Functional Measures: see goals     Pain Level (0-10 scale) post treatment: 7/10    ASSESSMENT/Changes in Function: Patient tolerated treatment session well today. Continued previous exercise program. Patient states increased fatigue as she had not completed her exercises in several days due to left leg pain. Patient continues to make appropriate progress toward goals and would benefit from continued skilled PT intervention to address remaining deficits outlined in goals below. Patient will continue to benefit from skilled PT services to modify and progress therapeutic interventions, address functional mobility deficits, address ROM deficits, address strength deficits, analyze and address soft tissue restrictions, analyze and cue movement patterns, analyze and modify body mechanics/ergonomics, assess and modify postural abnormalities, address imbalance/dizziness, and instruct in home and community integration to attain remaining goals. [x]  See Plan of Care  []  See progress note/recertification  []  See Discharge Summary         Progress towards goals / Updated goals:  Short Term Goals:    to be accomplished within 4 weeks:     Patient will report compliance with prescribed HEP(s) at least 1x/day in order to assist in maximizing therapeutic gains, reduce symptoms, and to progress towards overall prior function. Eval: HEP to be issued and reviewed with patient within 1-2 visits  Current: Patient reports daily compliance with her HEP. HEP updated with supine, seated, and standing exercises. 7/25/22, met     Patient will be able to perform TUG test in less than or equal to 12 seconds with [facility] in order to demonstrate improved stability during ambulation, thus reducing the patient's risk of falls.   Eval: TUG Test: 17 secs with rollator, 17 secs with [facility]  Current: TUG Test: 17 secs without AD   7/18/22, in progress     Patient will be able to perform at least 10 reps of sit <> stands with minimal use of UE's to push from chair during 30\" STS test to demonstrate improved LE strength and stability during this functional activity, thus reducing the patients risk of falls. Eval: 30\" STS: 7 reps with use of moderate use of UE's to push from table  Current: 30\" STS at chair height of 18\" 4 reps with moderate use of UE's to push from chair  7/6/22              Current: Patient had significant difficulty initiating sit to stand; able to complete 3 reps in 30\" at 20\" height  8/2/22, in progress     Long Term Goals:     to be accomplished within 8 weeks:     Patient will score at least 54 points on FOTO in order to maximize function and promote patient satisfaction with overall outcome. (Thirza Rica is an established functional score where 100 = no disability)  Eval: 46  Current: 64   7/13/22, met     Patient will report at least a 25% reduction in pain/symptoms when performing ADLs/functional activities in order to improve overall tolerance to functional movements. Eval: pain ranges from 6-8/10 - bilateral hips, bilateral knees, and bilateral ankles  Current: pain continues to range from 5-8/10 in bilateral hips/knees/ankles; however patient reports improvement in hip pain duration    7/27/22, in progress     Patient will demonstrate at least 4/5 strength of bilateral LE's in order to be able to safely perform functional activities and have a decreased risk of falls. Eval: Bilateral LE's grossly 4-/5 except for right knee extension/flexion 4/5, left hip abd 2-/5, right hip abduction 3-/5  Current: Bilateral LE's grossly 4/5 except for right knee extension 4+/5, right knee flexion 4/5, bilateral hip abduction 3-/5    7/18/22, in progress     Patient will be able to perform TUG test in less than or equal to 12 seconds without AD in order to demonstrate improved stability during ambulation, thus reducing the patient's risk of falls.   Eval: TUG Test: 17 secs with rollator, 17 secs with [facility] AND [place]  Current: TUG Test: 17 secs without AD   7/18/22, in progress     Patient will be able to perform at least 10 reps of sit <> stands without use of UE's during 30\" STS test to demonstrate improved LE strength and stability during this functional activity, thus reducing the patients risk of falls.   Eval: 30\" STS: 7 reps with use of moderate use of UE's to push from table  Current: 30\" STS at chair height of 18\" 4 reps with moderate use of UE's to push from chair  7/6/22   Current: 30\" STS at chair height of 20\":   9 reps without use of UE's   7/18/22, in progress    PLAN  []  Upgrade activities as tolerated     [x]  Continue plan of care  []  Update interventions per flow sheet       []  Discharge due to:_  []  Other:_      Wendy Singh, PT 8/3/2022  1:18 PM    Future Appointments   Date Time Provider Noah Gregory   8/5/2022  1:15 PM Shaan Hamm [facility]   8/8/2022  1:15 PM Shaan Hamm [facility]   8/10/2022  1:15 PM Wilton Melendez, [address] THE [facility]   8/12/2022  1:15 PM Dl Boston, PT [facility]

## 2022-08-05 ENCOUNTER — HOSPITAL ENCOUNTER (OUTPATIENT)
Dept: PHYSICAL THERAPY | Age: 57
Discharge: HOME | End: 2022-08-05
Payer: MEDICAID

## 2022-08-05 PROCEDURE — 97530 THERAPEUTIC ACTIVITIES: CPT

## 2022-08-05 PROCEDURE — 97112 NEUROMUSCULAR REEDUCATION: CPT

## 2022-08-05 PROCEDURE — 97110 THERAPEUTIC EXERCISES: CPT

## 2022-08-05 NOTE — PROGRESS NOTES
PT DAILY TREATMENT NOTE    Patient Name: Alyson Johnson  Date: 2022  : 1965  [x]  Patient  Verified  Payor: BLUE CROSS MEDICAID / Plan: [facility] PLUS / Product Type: Managed Care Medicaid /    In Time: 1:15  Out Time: 2:00  Total Treatment Time (min): 45  Total Timed Codes (min): 45  1:1 Treatment Time ( [address] only): 40   Visit #:     Treatment Dx: Other abnormalities of gait and mobility [R26.89]    SUBJECTIVE  Pre-Treatment Pain Level (0-10 scale): 5/10    Any medication changes, allergies to medications, adverse drug reactions, diagnosis change, or new procedure performed?: [x] No    [] Yes (see summary sheet for update)    Subjective Functional Status/Changes:  [] No changes reported  \"My right ankle is numb now. I called my orthopedic doctor about it and he scheduled me for an appt in mid-August, so I have a couple more weeks for that. \"    OBJECTIVE    30 min Therapeutic Exercise:  [x] See flow sheet   Rationale: increase ROM, increase strength, and decrease pain to assist in improving patient's ability to perform functional activities and ADLs    15 min Therapeutic Activity:  [x] See flow sheet   Rationale: increase ROM, increase strength, and improve coordination to assist in improving patient's ability to perform functional activities and ADLs    10 min Neuromuscular Re-Education:  [x] See flow sheet   Rationale: improve coordination, improve balance/proprioception, improve posture, and improve core stabilization to assist in improving patient's ability to perform functional activities and ADLs as well as to improve core stabilization during static/dynamic movements      With   [x] TE   [] TA   [] neuro   [] other: Patient Education: [x] Review HEP    [] Progressed/Changed HEP based on:   [] positioning   [] body mechanics   [] transfers   [] heat/ice application    [] other:      Other Objective/Functional Measures: see goals below     Pain Level (0-10 scale) Post Treatment: 5/10    ASSESSMENT/Changes in Function:  Patient responded well to today's treatment without any adverse reactions. Patient able to complete all prescribed exercises, though she does continue to have difficulty with flexion SLR's secondary to decreased bilateral LE strength as well as right hip pain. Patient educated about monitoring the numbness in her right foot/ankle and to report to MD if symptoms worsen. Patient continues to be limited in standing, walking, and generalized function secondary to multiple comorbidities including pain in multiple joints. Patient will continue to benefit from skilled PT services to further modify and progress therapeutic interventions, address functional mobility deficits, address ROM deficits, address strength deficits, analyze and address soft tissue restrictions, analyze and cue movement patterns, analyze and modify body mechanics/ergonomics, assess and modify postural abnormalities, and instruct in home and community integration to attain remaining goals. [x]  See Plan of Care  []  See Progress Note/Recertification  []  See Discharge Summary         Progress Towards Goals/Updated Goals:    Short Term Goals:    to be accomplished within 4 weeks:     Patient will report compliance with prescribed HEP(s) at least 1x/day in order to assist in maximizing therapeutic gains, reduce symptoms, and to progress towards overall prior function. Eval: HEP to be issued and reviewed with patient within 1-2 visits  Current: Patient reports daily compliance with her HEP. HEP updated with supine, seated, and standing exercises. 7/25/22, met     Patient will be able to perform TUG test in less than or equal to 12 seconds with [facility] in order to demonstrate improved stability during ambulation, thus reducing the patient's risk of falls.   Eval: TUG Test: 17 secs with rollator, 17 secs with [facility] AND [place]  Current: TUG Test: 17 secs without AD   7/18/22, in progress     Patient will be able to perform at least 10 reps of sit <> stands with minimal use of UE's to push from chair during 30\" STS test to demonstrate improved LE strength and stability during this functional activity, thus reducing the patients risk of falls. Eval: 30\" STS: 7 reps with use of moderate use of UE's to push from table  Current: 30\" STS at chair height of 18\" 4 reps with moderate use of UE's to push from chair  7/6/22              Current: Patient had significant difficulty initiating sit to stand; able to complete 3 reps in 30\" at 20\" height  8/2/22, in progress     Long Term Goals:     to be accomplished within 8 weeks:     Patient will score at least 54 points on FOTO in order to maximize function and promote patient satisfaction with overall outcome. ([place] is an established functional score where 100 = no disability)  Eval: 46  Current: 64   7/13/22, met     Patient will report at least a 25% reduction in pain/symptoms when performing ADLs/functional activities in order to improve overall tolerance to functional movements. Eval: pain ranges from 6-8/10 - bilateral hips, bilateral knees, and bilateral ankles  Current: pain continues to range from 5-8/10 in bilateral hips/knees/ankles; however patient reports improvement in hip pain duration    7/27/22, in progress     Patient will demonstrate at least 4/5 strength of bilateral LE's in order to be able to safely perform functional activities and have a decreased risk of falls. Eval: Bilateral LE's grossly 4-/5 except for right knee extension/flexion 4/5, left hip abd 2-/5, right hip abduction 3-/5  Current: Bilateral LE's grossly 4/5 except for right knee extension 4+/5, right knee flexion 4/5, bilateral hip abduction 3-/5    7/18/22, in progress     Patient will be able to perform TUG test in less than or equal to 12 seconds without AD in order to demonstrate improved stability during ambulation, thus reducing the patient's risk of falls.   Eval: TUG Test: 17 secs with rollator, 17 secs with [facility]  Current: TUG Test: 17 secs without AD   7/18/22, in progress     Patient will be able to perform at least 10 reps of sit <> stands without use of UE's during 30\" STS test to demonstrate improved LE strength and stability during this functional activity, thus reducing the patients risk of falls. Eval: 30\" STS: 7 reps with use of moderate use of UE's to push from table  Current: 30\" STS at chair height of 18\" 4 reps with moderate use of UE's to push from chair  7/6/22  Current: 30\" STS at chair height of 20\":   9 reps without use of UE's   7/18/22, in progress    PLAN  []  Upgrade Activities as Tolerated     [x]  Continue Plan of Care  []  Update Interventions per Flow Sheet       []  Discharge Due To:_  []  Other:_      Candance Mathieu.  Ludy, SHI, DPT, ATR  8/5/2022 9:24 AM    Future Appointments   Date Time Provider Noah Gregory   8/5/2022  [facility]   [facility]   8/12/2022  1:15 PM [place], Candance Mathieu, SHI [facility]

## 2022-08-08 ENCOUNTER — APPOINTMENT (OUTPATIENT)
Dept: PHYSICAL THERAPY | Age: 57
End: 2022-08-08
Payer: MEDICAID

## 2022-08-10 ENCOUNTER — APPOINTMENT (OUTPATIENT)
Dept: PHYSICAL THERAPY | Age: 57
End: 2022-08-10
Payer: MEDICAID

## 2022-08-10 ENCOUNTER — HOSPITAL ENCOUNTER (OUTPATIENT)
Dept: PHYSICAL THERAPY | Age: 57
Discharge: HOME | End: 2022-08-10
Payer: MEDICAID

## 2022-08-10 PROCEDURE — 97112 NEUROMUSCULAR REEDUCATION: CPT

## 2022-08-10 PROCEDURE — 97530 THERAPEUTIC ACTIVITIES: CPT

## 2022-08-10 PROCEDURE — 97110 THERAPEUTIC EXERCISES: CPT

## 2022-08-10 NOTE — PROGRESS NOTES
PT DAILY TREATMENT NOTE    Patient Name: Virginie Sample  Date:8/10/2022  : 1965  [x]  Patient  Verified  Payor: BLUE CROSS MEDICAID / Plan: [facility] HEALTHKEEPERS PLUS / Product Type: Managed Care Medicaid /    In time:1230  Out time:115  Total Treatment Time (min): 45  Total Timed Codes (min): 45  1:1 Treatment Time ([facility] only): 45   Visit #: 13 of 20    Treatment Dx: Other abnormalities of gait and mobility [R26.89]    SUBJECTIVE  Pain Level (0-10 scale): 6/10 [place] Knees   Any medication changes, allergies to medications, adverse drug reactions, diagnosis change, or new procedure performed?: [x] No    [] Yes (see summary sheet for update)  Subjective functional status/changes:   [] No changes reported  Pt reported that she has baby sat for the past 5 days and is very sore    OBJECTIVE    15 min Therapeutic Exercise:  [] See flow sheet :   Rationale: increase ROM, increase strength, improve coordination, improve balance, and increase proprioception to improve the patients ability to restore PLOF    15 min Therapeutic Activity:  []  See flow sheet :   Rationale: increase ROM, increase strength, improve coordination, improve balance, and increase proprioception  to improve the patients ability to complete transfers and ADLs without external assist     15 min Neuromuscular Re-education:  []  See flow sheet :   Rationale: increase ROM, increase strength, improve coordination, improve balance, and increase proprioception  to improve the patients ability to activate ankle and hip stabilizers without compensation            With   [x] TE   [x] TA   [x] neuro   [] other: Patient Education: [x] Review HEP    [x] Progressed/Changed HEP based on:   [] positioning   [] body mechanics   [] transfers   [] heat/ice application    [] other:      Pain Level (0-10 scale) post treatment: 6/10    ASSESSMENT/Changes in Function: Patient tolerated treatment session well today.  Patient had no complaints with addition of SAQ to exercise program to accomplish improved knee quad strength. Patient continues to make steady progress toward goals and would benefit from continued skilled PT intervention to address remaining deficits outlined in goals below. Patient will continue to benefit from skilled PT services to modify and progress therapeutic interventions, address functional mobility deficits, address ROM deficits, address strength deficits, analyze and address soft tissue restrictions, analyze and cue movement patterns, analyze and modify body mechanics/ergonomics, assess and modify postural abnormalities, address imbalance/dizziness, and instruct in home and community integration to attain remaining goals. [x]  See Plan of Care  []  See progress note/recertification  []  See Discharge Summary         Progress towards goals / Updated goals:  Short Term Goals:    to be accomplished within 4 weeks:     Patient will report compliance with prescribed HEP(s) at least 1x/day in order to assist in maximizing therapeutic gains, reduce symptoms, and to progress towards overall prior function. Eval: HEP to be issued and reviewed with patient within 1-2 visits  Current: Patient reports daily compliance with her HEP. HEP updated with supine, seated, and standing exercises. 7/25/22, met     Patient will be able to perform TUG test in less than or equal to 12 seconds with [facility] in order to demonstrate improved stability during ambulation, thus reducing the patient's risk of falls. Eval: TUG Test: 17 secs with rollator, 17 secs with [facility]  Current: TUG Test: 17 secs without AD   7/18/22, in progress     Patient will be able to perform at least 10 reps of sit <> stands with minimal use of UE's to push from chair during 30\" STS test to demonstrate improved LE strength and stability during this functional activity, thus reducing the patients risk of falls.   Eval: 30\" STS: 7 reps with use of moderate use of UE's to push from table  Current: 30\" STS at chair height of 18\" 4 reps with moderate use of UE's to push from chair  7/6/22              Current: Patient had significant difficulty initiating sit to stand; able to complete 3 reps in 30\" at 20\" height  8/2/22, in progress     Long Term Goals:     to be accomplished within 8 weeks:     Patient will score at least 54 points on FOTO in order to maximize function and promote patient satisfaction with overall outcome. ([place] Lesser is an established functional score where 100 = no disability)  Eval: 46  Current: 64   7/13/22, met     Patient will report at least a 25% reduction in pain/symptoms when performing ADLs/functional activities in order to improve overall tolerance to functional movements. Eval: pain ranges from 6-8/10 - bilateral hips, bilateral knees, and bilateral ankles  Current: pain continues to range from 5-8/10 in bilateral hips/knees/ankles; however patient reports improvement in hip pain duration    7/27/22, in progress  Current: 9/10 in knees in the past week. 8/10/22     Patient will demonstrate at least 4/5 strength of bilateral LE's in order to be able to safely perform functional activities and have a decreased risk of falls. Eval: Bilateral LE's grossly 4-/5 except for right knee extension/flexion 4/5, left hip abd 2-/5, right hip abduction 3-/5  Current: Bilateral LE's grossly 4/5 except for right knee extension 4+/5, right knee flexion 4/5, bilateral hip abduction 3-/5    7/18/22, in progress     Patient will be able to perform TUG test in less than or equal to 12 seconds without AD in order to demonstrate improved stability during ambulation, thus reducing the patient's risk of falls.   Eval: TUG Test: 17 secs with rollator, 17 secs with [facility] AND [place]  Current: TUG Test: 17 secs without AD   7/18/22, in progress     Patient will be able to perform at least 10 reps of sit <> stands without use of UE's during 30\" STS test to demonstrate improved LE strength and stability during this functional activity, thus reducing the patients risk of falls.   Eval: 30\" STS: 7 reps with use of moderate use of UE's to push from table  Current: 30\" STS at chair height of 18\" 4 reps with moderate use of UE's to push from chair  7/6/22  Current: 30\" STS at chair height of 20\":   9 reps without use of UE's   7/18/22, in progress       PLAN  []  Upgrade activities as tolerated     [x]  Continue plan of care  []  Update interventions per flow sheet       []  Discharge due to:_  []  Other:_      Pebbles Hernandez, PT 8/10/2022  12:24 PM    Future Appointments   Date Time Provider Noah Gregory   8/10/2022 12:30 PM Rl Moore [facility] [employer] [facility]   8/12/2022  1:15 PM Lisa Boston, PT [facility]   8/15/2022  3:30 PM Rl Moore [facility] [employer] [facility]   8/17/2022  2:00 PM Rl Moore PT [facility]   8/19/2022  1:15 PM Lisa Boston PT [facility]

## 2022-08-12 ENCOUNTER — HOSPITAL ENCOUNTER (OUTPATIENT)
Dept: PHYSICAL THERAPY | Age: 57
Discharge: HOME | End: 2022-08-12
Payer: MEDICAID

## 2022-08-12 PROCEDURE — 97110 THERAPEUTIC EXERCISES: CPT

## 2022-08-12 PROCEDURE — 97530 THERAPEUTIC ACTIVITIES: CPT

## 2022-08-12 PROCEDURE — 97112 NEUROMUSCULAR REEDUCATION: CPT

## 2022-08-12 NOTE — PROGRESS NOTES
PT DAILY TREATMENT NOTE    Patient Name: Youlanda Castleman  Date: 2022  : 1965  [x]  Patient  Verified  Payor: BLUE CROSS MEDICAID / Plan: [facility] HEALTHKEEPERS PLUS / Product Type: Managed Care Medicaid /    In Time: 1:20  Out Time: 1:59  Total Treatment Time (min): 39  Total Timed Codes (min): 39  1:1 Treatment Time ( [address] only): 39   Visit #:     Treatment Dx: Other abnormalities of gait and mobility [R26.89]    SUBJECTIVE  Pre-Treatment Pain Level (0-10 scale): 4/10    Any medication changes, allergies to medications, adverse drug reactions, diagnosis change, or new procedure performed?: [x] No    [] Yes (see summary sheet for update)    Subjective Functional Status/Changes:  [] No changes reported  Patient states her right foot isn't nearly as it was the other day. She reports continued pain throughout multiple joints. Patient states next week she has a follow-up with her ankle MD as well as CT's of both hips and both knees.     OBJECTIVE    12 min Therapeutic Exercise:  [x] See flow sheet   Rationale: increase ROM, increase strength, and decrease pain to assist in improving patient's ability to perform functional activities and ADLs    8 min Therapeutic Activity:  [x] See flow sheet   Rationale: increase ROM, increase strength, and improve coordination to assist in improving patient's ability to perform functional activities and ADLs    11 min Neuromuscular Re-Education:  [x] See flow sheet   Rationale: improve coordination, improve balance/proprioception, improve posture, and improve core stabilization to assist in improving patient's ability to perform functional activities and ADLs as well as to improve core stabilization during static/dynamic movements    8 min Gait Training:  __200'_ feet with __SPC_ device on level surfaces with __supervision_ level of assistance   Rationale: improve ambulation safety and efficiency in order to improve patient's ability to safely ambulate at home for self care       With   [x] TE   [] TA   [] neuro   [] other: Patient Education: [x] Review HEP    [] Progressed/Changed HEP based on:   [] positioning   [] body mechanics   [] transfers   [] heat/ice application    [] other:      Other Objective/Functional Measures: see goals below     Pain Level (0-10 scale) Post Treatment: 5/10    ASSESSMENT/Changes in Function:  Patient responded well to today's treatment without any adverse reactions. Initiated ambulation with SPC today, which patient did pretty well with including demonstrating step-through pattern. Gait with rollator and with SPC continues to be antalgic with increased lateral trunk lean and with slow gait speed, though her mechanics are overall functional.  Will continue to progress patient's walking distance with SPC, in addition to further improving her balance. Patient will continue to benefit from skilled PT services to further modify and progress therapeutic interventions, address functional mobility deficits, address ROM deficits, address strength deficits, analyze and address soft tissue restrictions, analyze and cue movement patterns, analyze and modify body mechanics/ergonomics, assess and modify postural abnormalities, and instruct in home and community integration to attain remaining goals. [x]  See Plan of Care  []  See Progress Note/Recertification  []  See Discharge Summary         Progress Towards Goals/Updated Goals:    Short Term Goals:    to be accomplished within 4 weeks:     Patient will report compliance with prescribed HEP(s) at least 1x/day in order to assist in maximizing therapeutic gains, reduce symptoms, and to progress towards overall prior function. Eval: HEP to be issued and reviewed with patient within 1-2 visits  Current: Patient reports daily compliance with her HEP. HEP updated with supine, seated, and standing exercises.  7/25/22, met     Patient will be able to perform TUG test in less than or equal to 12 seconds with [facility] in order to demonstrate improved stability during ambulation, thus reducing the patient's risk of falls. Eval: TUG Test: 17 secs with rollator, 17 secs with [facility] AND [place]  Current: TUG Test: 17 secs without AD   7/18/22, in progress     Patient will be able to perform at least 10 reps of sit <> stands with minimal use of UE's to push from chair during 30\" STS test to demonstrate improved LE strength and stability during this functional activity, thus reducing the patients risk of falls. Eval: 30\" STS: 7 reps with use of moderate use of UE's to push from table  Current: 30\" STS at chair height of 18\" 4 reps with moderate use of UE's to push from chair  7/6/22              Current: Patient had significant difficulty initiating sit to stand; able to complete 3 reps in 30\" at 20\" height  8/2/22, in progress     Long Term Goals:     to be accomplished within 8 weeks:     Patient will score at least 54 points on FOTO in order to maximize function and promote patient satisfaction with overall outcome. (Judeth Barrette is an established functional score where 100 = no disability)  Eval: 46  Current: 64   7/13/22, met     Patient will report at least a 25% reduction in pain/symptoms when performing ADLs/functional activities in order to improve overall tolerance to functional movements. Eval: pain ranges from 6-8/10 - bilateral hips, bilateral knees, and bilateral ankles  Current: pain continues to range from 5-8/10 in bilateral hips/knees/ankles; however patient reports improvement in hip pain duration    7/27/22, in progress  Current: 9/10 in knees in the past week. 8/10/22     Patient will demonstrate at least 4/5 strength of bilateral LE's in order to be able to safely perform functional activities and have a decreased risk of falls.   Eval: Bilateral LE's grossly 4-/5 except for right knee extension/flexion 4/5, left hip abd 2-/5, right hip abduction 3-/5  Current: Bilateral LE's grossly 4/5 except for right knee extension 4+/5, right knee flexion 4/5, bilateral hip abduction 3-/5    7/18/22, in progress     Patient will be able to perform TUG test in less than or equal to 12 seconds without AD in order to demonstrate improved stability during ambulation, thus reducing the patient's risk of falls. Eval: TUG Test: 17 secs with rollator, 17 secs with [facility] AND [place]  Current: TUG Test: 17 secs without AD   7/18/22, in progress     Patient will be able to perform at least 10 reps of sit <> stands without use of UE's during 30\" STS test to demonstrate improved LE strength and stability during this functional activity, thus reducing the patients risk of falls. Eval: 30\" STS: 7 reps with use of moderate use of UE's to push from table  Current: 30\" STS at chair height of 18\" 4 reps with moderate use of UE's to push from chair  7/6/22  Current: 30\" STS at chair height of 20\":   9 reps without use of UE's   7/18/22, in progress    PLAN  []  Upgrade Activities as Tolerated     [x]  Continue Plan of Care  []  Update Interventions per Flow Sheet       []  Discharge Due To:_  []  Other:_      Jose D Johnson.  Ludy, SHI, DPT, ATR  8/12/2022 8:16 AM    Future Appointments   Date Time Provider Noah Gregory   8/12/2022  1:15 PM Harsh Prince [facility]   8/15/2022  3:30 PM Indy Doulgas PT [facility]   8/17/2022  2:00 PM Indy Douglas PT [facility]   8/19/2022  1:15 PM Jose D Boston PT [facility]

## 2022-08-15 ENCOUNTER — HOSPITAL ENCOUNTER (OUTPATIENT)
Dept: PHYSICAL THERAPY | Age: 57
Discharge: HOME | End: 2022-08-15
Payer: MEDICAID

## 2022-08-15 PROCEDURE — 97110 THERAPEUTIC EXERCISES: CPT

## 2022-08-15 PROCEDURE — 97112 NEUROMUSCULAR REEDUCATION: CPT

## 2022-08-15 PROCEDURE — 97116 GAIT TRAINING THERAPY: CPT

## 2022-08-15 NOTE — PROGRESS NOTES
PT DAILY TREATMENT NOTE    Patient Name: eKke Sebastian  Date:8/15/2022  : 1965  [x]  Patient  Verified  Payor: BLUE CROSS MEDICAID / Plan: [facility] HEALTHKEEPERS PLUS / Product Type: Managed Care Medicaid /    In time:333  Out time:430  Total Treatment Time (min): 57  Total Timed Codes (min): 53  1:1 Treatment Time (MC/BCBS only): 53  Visit #: 15 of 20    Treatment Dx: Other abnormalities of gait and mobility [R26.89]    SUBJECTIVE  Pain Level (0-10 scale): 6/10   Any medication changes, allergies to medications, adverse drug reactions, diagnosis change, or new procedure performed?: [x] No    [] Yes (see summary sheet for update)  Subjective functional status/changes:   [] No changes reported  Patient reports feeling sore today after helping clean up trash in her yard (having a fence built). OBJECTIVE    35 min Therapeutic Exercise:  [] See flow sheet :   Rationale: increase strength and improve coordination to improve the patients ability to ambulate with increased ease         10 min Neuromuscular Re-education:  [x]  See flow sheet : TUG   Rationale: improve coordination, improve balance, and increase proprioception  to improve the patients ability to prevent a fall        8 min Gait Training:  _200__ feet with _SPC__ device on level surfaces with _SBA__ level of assistance   Rationale: To improve ambulation safety and efficiency in order to improve patient's ability to safely ambulate at home for self care. With   [] TE   [] TA   [] neuro   [] other: Patient Education: [x] Review HEP    [] Progressed/Changed HEP based on:   [] positioning   [] body mechanics   [] transfers   [] heat/ice application    [] other:      Other Objective/Functional Measures: TUG  with rollator = 20.5 sec and 16. Pain Level (0-10 scale) post treatment: 6/10    ASSESSMENT/Changes in Function: Patient tolerated treatment session well today.  Patient had no complaints with addition of gait training with SPC to exercise program to accomplish increased independence. Patient continues to make steady  progress toward goals and would benefit from continued skilled PT intervention to address remaining deficits outlined in goals below. Patient will continue to benefit from skilled PT services to modify and progress therapeutic interventions, address functional mobility deficits, address ROM deficits, address strength deficits, analyze and address soft tissue restrictions, analyze and cue movement patterns, analyze and modify body mechanics/ergonomics, and assess and modify postural abnormalities to attain remaining goals. [x]  See Plan of Care  []  See progress note/recertification  []  See Discharge Summary         Progress towards goals / Updated goals:     Short Term Goals:    to be accomplished within 4 weeks:     Patient will report compliance with prescribed HEP(s) at least 1x/day in order to assist in maximizing therapeutic gains, reduce symptoms, and to progress towards overall prior function. Eval: HEP to be issued and reviewed with patient within 1-2 visits  Current: Patient reports daily compliance with her HEP. HEP updated with supine, seated, and standing exercises. 7/25/22, met     Patient will be able to perform TUG test in less than or equal to 12 seconds with [facility] in order to demonstrate improved stability during ambulation, thus reducing the patient's risk of falls. Eval: TUG Test: 17 secs with rollator, 17 secs with [facility]  Current: TUG Test: 17 secs without AD   7/18/22, in progress  Current:  TUG Test with rollator:  20.5 sec  and 16.6 sec progressing. 8/15/2022        Patient will be able to perform at least 10 reps of sit <> stands with minimal use of UE's to push from chair during 30\" STS test to demonstrate improved LE strength and stability during this functional activity, thus reducing the patients risk of falls.   Eval: 30\" STS: 7 reps with use of moderate use of UE's to push from table  Current: 30\" STS at chair height of 18\" 4 reps with moderate use of UE's to push from chair  7/6/22              Current: Patient had significant difficulty initiating sit to stand; able to complete 3 reps in 30\" at 20\" height  8/2/22, in progress     Long Term Goals:     to be accomplished within 8 weeks:     Patient will score at least 54 points on FOTO in order to maximize function and promote patient satisfaction with overall outcome. (Rondel Prophet is an established functional score where 100 = no disability)  Eval: 46  Current: 64   7/13/22, met     Patient will report at least a 25% reduction in pain/symptoms when performing ADLs/functional activities in order to improve overall tolerance to functional movements. Eval: pain ranges from 6-8/10 - bilateral hips, bilateral knees, and bilateral ankles  Current: pain continues to range from 5-8/10 in bilateral hips/knees/ankles; however patient reports improvement in hip pain duration    7/27/22, in progress  Current: 9/10 in knees in the past week. 8/10/22     Patient will demonstrate at least 4/5 strength of bilateral LE's in order to be able to safely perform functional activities and have a decreased risk of falls. Eval: Bilateral LE's grossly 4-/5 except for right knee extension/flexion 4/5, left hip abd 2-/5, right hip abduction 3-/5  Current: Bilateral LE's grossly 4/5 except for right knee extension 4+/5, right knee flexion 4/5, bilateral hip abduction 3-/5    7/18/22, in progress     Patient will be able to perform TUG test in less than or equal to 12 seconds without AD in order to demonstrate improved stability during ambulation, thus reducing the patient's risk of falls. Eval: TUG Test: 17 secs with rollator, 17 secs with [facility] AND [place]  Current: TUG Test: 17 secs without AD   7/18/22, in progress  Current:  TUG Test with rollator:  20.5 sec  and 16.6 sec progressing.   8/15/2022     Patient will be able to perform at least 10 reps of sit <> stands without use of UE's during 30\" STS test to demonstrate improved LE strength and stability during this functional activity, thus reducing the patients risk of falls.   Eval: 30\" STS: 7 reps with use of moderate use of UE's to push from table  Current: 30\" STS at chair height of 18\" 4 reps with moderate use of UE's to push from chair  7/6/22  Current: 30\" STS at chair height of 20\":   9 reps without use of UE's   7/18/22, in progress       PLAN  []  Upgrade activities as tolerated     [x]  Continue plan of care  []  Update interventions per flow sheet       []  Discharge due to:_  []  Other:_      Ian Siddiqui PT 8/15/2022  2:49 PM    Future Appointments   Date Time Provider Noah Gregory   8/15/2022  3:30 PM Rebecca Limon, [address] THE [facility]   8/17/2022  2:00 PM Iron Mackey, [address] [facility]   8/19/2022  1:15 PM Shahriar Boston, PT [facility]

## 2022-08-17 ENCOUNTER — HOSPITAL ENCOUNTER (OUTPATIENT)
Dept: PHYSICAL THERAPY | Age: 57
Discharge: HOME | End: 2022-08-17
Payer: MEDICAID

## 2022-08-17 PROCEDURE — 97112 NEUROMUSCULAR REEDUCATION: CPT

## 2022-08-17 PROCEDURE — 97116 GAIT TRAINING THERAPY: CPT

## 2022-08-17 PROCEDURE — 97110 THERAPEUTIC EXERCISES: CPT

## 2022-08-17 PROCEDURE — 97530 THERAPEUTIC ACTIVITIES: CPT

## 2022-08-17 NOTE — PROGRESS NOTES
PT DAILY TREATMENT NOTE    Patient Name: Wendi Doyle  Date:2022  : 1965  [x]  Patient  Verified  Payor: BLUE CROSS MEDICAID / Plan: [facility] HEALTHKEEPERS PLUS / Product Type: Managed Care Medicaid /    In time:200 Out time:253  Total Treatment Time (min): 53  Total Timed Codes (min): 53  1:1 Treatment Time (MC/BCBS only): 53   Visit #: 16 of 20    Treatment Dx: Other abnormalities of gait and mobility [R26.89]    SUBJECTIVE  Pain Level (0-10 scale): 5/10  Any medication changes, allergies to medications, adverse drug reactions, diagnosis change, or new procedure performed?: [x] No    [] Yes (see summary sheet for update)  Subjective functional status/changes:   [] No changes reported  Pt reported mild right hip pain     OBJECTIVE    15 min Therapeutic Exercise:  [] See flow sheet :   Rationale: increase ROM, increase strength, improve coordination, improve balance, and increase proprioception to improve the patients ability to restore PLOF    15 min Therapeutic Activity:  []  See flow sheet :   Rationale: increase ROM, increase strength, improve coordination, improve balance, and increase proprioception  to improve the patients ability to complete transfers and ADLs without external assist     15 min Neuromuscular Re-education:  []  See flow sheet :   Rationale: increase ROM, increase strength, improve coordination, improve balance, and increase proprioception  to improve the patients ability to activate ankle and hip stabilizers without compensation    8 min Gait Training:  1 x 150 feet with SPC and CGA/SBA using right hand due to left knee buckle    Rationale: To improve ambulation safety and efficiency in order to improve patient's ability to safely ambulate at home for self care.            With   [] TE   [] TA   [] neuro   [] other: Patient Education: [x] Review HEP    [] Progressed/Changed HEP based on:   [] positioning   [] body mechanics   [] transfers   [] heat/ice application [] other:      Pain Level (0-10 scale) post treatment: 5/10    ASSESSMENT/Changes in Function: Patient tolerated treatment session well today. Patient has been treated by PT for 20 visits at this time. Pt has progressed toward all goals and is demonstrating improved strength, improved STS test, and improved TUG there by decreasing fall risk. Pt continues to need strengthening in left quads and boby hips at this time to facilitate improved transition to [facility] for home use. PT did trial SPC today and PT should carry it in right hand due to left quad weakness and buckling. Patient continues to make steady progress toward goals and would benefit from continued skilled PT intervention to address remaining deficits outlined in goals below. Patient will continue to benefit from skilled PT services to modify and progress therapeutic interventions, address functional mobility deficits, address ROM deficits, address strength deficits, analyze and address soft tissue restrictions, analyze and cue movement patterns, analyze and modify body mechanics/ergonomics, assess and modify postural abnormalities, address imbalance/dizziness, and instruct in home and community integration to attain remaining goals. [x]  See Plan of Care  []  See progress note/recertification  []  See Discharge Summary         Progress towards goals / Updated goals:  Short Term Goals:    to be accomplished within 4 weeks:     Patient will report compliance with prescribed HEP(s) at least 1x/day in order to assist in maximizing therapeutic gains, reduce symptoms, and to progress towards overall prior function. Eval: HEP to be issued and reviewed with patient within 1-2 visits  Progress note: Patient reports daily compliance with her HEP. HEP updated with supine, seated, and standing exercises.  7/25/22, met     Patient will be able to perform TUG test in less than or equal to 12 seconds with [facility] AND [place] in order to demonstrate improved stability during ambulation, thus reducing the patient's risk of falls. Eval: TUG Test: 17 secs with rollator, 17 secs with [facility] AND [place]  Current: TUG Test: 17 secs without AD   7/18/22, in progress  Current:  TUG Test with rollator:  20.5 sec  and 16.6 sec progressing. 8/15/2022  PROGRESS NOTE: 13 sec with rollator 16 sec with [facility] and SBA 8/17/22        Patient will be able to perform at least 10 reps of sit <> stands with minimal use of UE's to push from chair during 30\" STS test to demonstrate improved LE strength and stability during this functional activity, thus reducing the patients risk of falls. Eval: 30\" STS: 7 reps with use of moderate use of UE's to push from table  Current: 30\" STS at chair height of 18\" 4 reps with moderate use of UE's to push from chair  7/6/22              Current: Patient had significant difficulty initiating sit to stand; able to complete 3 reps in 30\" at 20\" height  8/2/22, in progress   Progress Note: 8 reps 17 in chair with UE use. 8/17/22     Long Term Goals:     to be accomplished within 8 weeks:     Patient will score at least 54 points on FOTO in order to maximize function and promote patient satisfaction with overall outcome. (Marjorie Expose is an established functional score where 100 = no disability)  Eval: 46  Progress note: 64   7/13/22, met     Patient will report at least a 25% reduction in pain/symptoms when performing ADLs/functional activities in order to improve overall tolerance to functional movements. Eval: pain ranges from 6-8/10 - bilateral hips, bilateral knees, and bilateral ankles  Current: pain continues to range from 5-8/10 in bilateral hips/knees/ankles; however patient reports improvement in hip pain duration    7/27/22, in progress  Progress note : 6/10 right hip  8/17/22     Patient will demonstrate at least 4/5 strength of bilateral LE's in order to be able to safely perform functional activities and have a decreased risk of falls.   Eval: Bilateral LE's grossly 4-/5 except for right knee extension/flexion 4/5, left hip abd 2-/5, right hip abduction 3-/5  Current: Bilateral LE's grossly 4/5 except for right knee extension 4+/5, right knee flexion 4/5, bilateral hip abduction 3-/5    7/18/22, in progress  PROGRESS NOTE:  Right hip flexion: 4/5, knee extension 5/5, dorsiflexion 5/5, hip abduction 3/5, knee flexion 4+/5   LEFT hip flexion 4/5, knee extension 4/5, dorsiflexion 5/5, hip abduction 3/5, and knee flexion 4+/5     Patient will be able to perform TUG test in less than or equal to 12 seconds without AD in order to demonstrate improved stability during ambulation, thus reducing the patient's risk of falls. Eval: TUG Test: 17 secs with rollator, 17 secs with [facility] AND [place]  Current: TUG Test: 17 secs without AD   7/18/22, in progress  Current:  TUG Test with rollator:  20.5 sec  and 16.6 sec progressing. 8/15/2022     Patient will be able to perform at least 10 reps of sit <> stands without use of UE's during 30\" STS test to demonstrate improved LE strength and stability during this functional activity, thus reducing the patients risk of falls. Eval: 30\" STS: 7 reps with use of moderate use of UE's to push from table  Current: 30\" STS at chair height of 18\" 4 reps with moderate use of UE's to push from chair  7/6/22  Current: 30\" STS at chair height of 20\":   9 reps without use of UE's   7/18/22, in progress  Progress Note: 8 reps 17 in chair with UE use.   8/17/22       PLAN  []  Upgrade activities as tolerated     [x]  Continue plan of care  []  Update interventions per flow sheet       []  Discharge due to:_  []  Other:_      Teodora Duong, PT 8/17/2022  1:41 PM    Future Appointments   Date Time Provider Noah Gregory   8/17/2022  2:00 PM Colette Becerril, [address] [facility] THE [facility]   8/19/2022  1:15 PM [place], Armida Boeck, PT [facility]

## 2022-08-17 NOTE — PROGRESS NOTES
[facility] at [facility] [address] [place]ormier [place], [address] [facility] [place]  Ph [phone]  Fx [phone]    Physical Therapy Progress Note  Patient name: Jam Sims Start of Care: 2022   Referral source: Lebron Bravo NP : 1965               Medical Diagnosis: Other abnormalities of gait and mobility [R26.89]    Onset Date:22               Treatment Diagnosis: gait instability                                             ICD-10: Other abnormalities of gait and mobility [R26.89]   Prior Hospitalization: see medical history Provider#: [id number]   Medications: Verified on Patient summary List    Comorbidities: s/p bilateral knee replacements (), s/p right ankle fusion 2021, OA multiple joints needing bilateral hip replacements and left ankle fusion , chronic kidney disease though no dialysis, chronic iron-deficiency anemia (her lifetime per patient), heart cardioversion secondary to A-fib (2022), , RA, cataracts, hypertension, gout, \"sensitive stomach\", eczema, morbid obesity  Substance Use: [] tobacco use, [] alcohol use, [] other:   Prior Level of Function:  Prior to past 6 months: no AD, could ambulate shorter distances, step-to pattern on steps for years, decreased balance, pain of multiple joints limiting function  [] Unrestricted with functional activities and ADLs  [] No assistive device  [] active lifestyle, [] moderately active lifestyle, [x] sedentary lifestyle  Patients Goals:  \"to get my balance back without having to use a cane or walker\"      Visits from Start of Care: 16    Missed Visits: 0    Updated Goals/Measure of Progress: To be achieved in 4 weeks:    Short Term Goals:    to be accomplished within 4 weeks:     Patient will report compliance with prescribed HEP(s) at least 1x/day in order to assist in maximizing therapeutic gains, reduce symptoms, and to progress towards overall prior function.   Eval: HEP to be issued and reviewed with patient within 1-2 visits  Progress note: Patient reports daily compliance with her HEP. HEP updated with supine, seated, and standing exercises. 7/25/22, met     Patient will be able to perform TUG test in less than or equal to 12 seconds with [facility] in order to demonstrate improved stability during ambulation, thus reducing the patient's risk of falls. Eval: TUG Test: 17 secs with rollator, 17 secs with [facility] AND [place]  Current: TUG Test: 17 secs without AD   7/18/22, in progress  Current:  TUG Test with rollator:  20.5 sec  and 16.6 sec progressing. 8/15/2022  PROGRESS NOTE: 13 sec with rollator 16 sec with [facility] and SBA 8/17/22        Patient will be able to perform at least 10 reps of sit <> stands with minimal use of UE's to push from chair during 30\" STS test to demonstrate improved LE strength and stability during this functional activity, thus reducing the patients risk of falls. Eval: 30\" STS: 7 reps with use of moderate use of UE's to push from table  Current: 30\" STS at chair height of 18\" 4 reps with moderate use of UE's to push from chair  7/6/22              Current: Patient had significant difficulty initiating sit to stand; able to complete 3 reps in 30\" at 20\" height  8/2/22, in progress              Progress Note: 8 reps 17 in chair with UE use. 8/17/22     Long Term Goals:     to be accomplished within 8 weeks:     Patient will score at least 54 points on FOTO in order to maximize function and promote patient satisfaction with overall outcome. ([place] is an established functional score where 100 = no disability)  Eval: 46  Progress note: 64   7/13/22, met     Patient will report at least a 25% reduction in pain/symptoms when performing ADLs/functional activities in order to improve overall tolerance to functional movements.   Eval: pain ranges from 6-8/10 - bilateral hips, bilateral knees, and bilateral ankles  Current: pain continues to range from 5-8/10 in bilateral hips/knees/ankles; however patient reports improvement in hip pain duration    7/27/22, in progress  Progress note : 6/10 right hip  8/17/22     Patient will demonstrate at least 4/5 strength of bilateral LE's in order to be able to safely perform functional activities and have a decreased risk of falls. Eval: Bilateral LE's grossly 4-/5 except for right knee extension/flexion 4/5, left hip abd 2-/5, right hip abduction 3-/5  Current: Bilateral LE's grossly 4/5 except for right knee extension 4+/5, right knee flexion 4/5, bilateral hip abduction 3-/5    7/18/22, in progress  PROGRESS NOTE:  Right hip flexion: 4/5, knee extension 5/5, dorsiflexion 5/5, hip abduction 3/5, knee flexion 4+/5   LEFT hip flexion 4/5, knee extension 4/5, dorsiflexion 5/5, hip abduction 3/5, and knee flexion 4+/5     Patient will be able to perform TUG test in less than or equal to 12 seconds without AD in order to demonstrate improved stability during ambulation, thus reducing the patient's risk of falls. Eval: TUG Test: 17 secs with rollator, 17 secs with [facility] AND [place]  Current: TUG Test: 17 secs without AD   7/18/22, in progress  Current:  TUG Test with rollator:  20.5 sec  and 16.6 sec progressing. 8/15/2022     Patient will be able to perform at least 10 reps of sit <> stands without use of UE's during 30\" STS test to demonstrate improved LE strength and stability during this functional activity, thus reducing the patients risk of falls. Eval: 30\" STS: 7 reps with use of moderate use of UE's to push from table  Current: 30\" STS at chair height of 18\" 4 reps with moderate use of UE's to push from chair  7/6/22  Current: 30\" STS at chair height of 20\":   9 reps without use of UE's   7/18/22, in progress  Progress Note: 8 reps 17 in chair with UE use. 8/17/22       Summary of Care/ Key Functional Changes: Patient tolerated treatment session well today. Patient has been treated by PT for 20 visits at this time.   Pt has progressed toward all goals and is demonstrating improved strength, improved STS test, and improved TUG there by decreasing fall risk. Pt continues to need strengthening in left quads and boby hips at this time to facilitate improved transition to [facility] for home use. PT did trial SPC today and PT should carry it in right hand due to left quad weakness and buckling. Patient continues to make steady progress toward goals and would benefit from continued skilled PT intervention to address remaining deficits outlined in goals below. Patient will continue to benefit from skilled PT services to modify and progress therapeutic interventions, address functional mobility deficits, address ROM deficits, address strength deficits, analyze and address soft tissue restrictions, analyze and cue movement patterns, analyze and modify body mechanics/ergonomics, assess and modify postural abnormalities, address imbalance/dizziness, and instruct in home and community integration to attain remaining goals. ASSESSMENT/RECOMMENDATIONS:  [x]Continue therapy per initial plan/protocol at a frequency of  2 x per week for 4 weeks  []Continue therapy with the following recommended changes:_____________________ _____________________________ ________________________________________  []Discontinue therapy progressing towards or have reached established goals  []Discontinue therapy due to lack of appreciable progress towards goals  []Discontinue therapy due to lack of attendance or compliance  []Await Physician's recommendations/decisions regarding therapy  []Other:________________________________________________________________    Thank you for this referral.   Lori Dela Cruz, PT 8/17/2022 3:00 PM    ________________________________________________________________________________________________________________________________________________  I certify that the above Therapy Services are being furnished while the patient is under my care.  I agree with the treatment plan and certify that this therapy is necessary. [] I have read the above and request that my patient continue as recommended.   [] I have read the above report and request that my patient continue therapy with the following changes/special instructions: _______________________________________  [] I have read the above report and request that my patient be discharged from therapy    Physician's Signature:____________________ Date:_________ TIME:________       Ernesto Carrillo NP  ** Signature, Date and Time must be completed for valid certification **  Please sign and return to [facility] at [facility] [address] [place], [address] [facility] [place]    Ph [phone]  Fx [phone]

## 2022-08-19 ENCOUNTER — APPOINTMENT (OUTPATIENT)
Dept: PHYSICAL THERAPY | Age: 57
End: 2022-08-19
Payer: MEDICAID

## 2022-08-24 ENCOUNTER — APPOINTMENT (OUTPATIENT)
Dept: PHYSICAL THERAPY | Age: 57
End: 2022-08-24
Payer: MEDICAID

## 2022-08-25 ENCOUNTER — HOSPITAL ENCOUNTER (OUTPATIENT)
Dept: PHYSICAL THERAPY | Age: 57
Discharge: HOME | End: 2022-08-25
Payer: MEDICAID

## 2022-08-25 PROCEDURE — 97110 THERAPEUTIC EXERCISES: CPT

## 2022-08-25 PROCEDURE — 97112 NEUROMUSCULAR REEDUCATION: CPT

## 2022-08-25 PROCEDURE — 97530 THERAPEUTIC ACTIVITIES: CPT

## 2022-08-25 NOTE — PROGRESS NOTES
PT DAILY TREATMENT NOTE    Patient Name: Argelia Donovan  Date: 2022  : 1965  [x]  Patient  Verified  Payor: BLUE CROSS MEDICAID / Plan: [facility] HEALTHKEEPERS PLUS / Product Type: Managed Care Medicaid /    In Time: 12:35  Out Time: 1:15  Total Treatment Time (min): 40  Total Timed Codes (min): 40  1:1 Treatment Time ( [address] only): 40   Visit #:     Treatment Dx: Other abnormalities of gait and mobility [R26.89]    SUBJECTIVE  Pre-Treatment Pain Level (0-10 scale): 5/10    Any medication changes, allergies to medications, adverse drug reactions, diagnosis change, or new procedure performed?: [x] No    [] Yes (see summary sheet for update)    Subjective Functional Status/Changes:  [] No changes reported  \"I saw the ortho doctor the other day and he said the CT shows that I need to have my left knee replaced ASAP. I have to get cleared by a bunch of doctors first, so he is aiming for the end of September or the beginning of October for surgery. He gave me a brace to wear until then. He also said my right knee needs to be replaced too, but the left knee needs to be done first.\"  Patient also states other ortho surgeons have recently confirmed she needs to have her right ankle and right hip replaced too, which she goes to MD for this coming Tuesday to discuss.     OBJECTIVE    15 min Therapeutic Exercise:  [x] See flow sheet   Rationale: increase ROM, increase strength, and decrease pain to assist in improving patient's ability to perform functional activities and ADLs    15 min Therapeutic Activity:  [x] See flow sheet   Rationale: increase ROM, increase strength, and improve coordination to assist in improving patient's ability to perform functional activities and ADLs    10 min Neuromuscular Re-Education:  [x] See flow sheet   Rationale: improve coordination, improve balance/proprioception, improve posture, and improve core stabilization to assist in improving patient's ability to perform functional activities and ADLs as well as to improve core stabilization during static/dynamic movements      With   [x] TE   [] TA   [] neuro   [] other: Patient Education: [x] Review HEP    [] Progressed/Changed HEP based on:   [] positioning   [] body mechanics   [] transfers   [] heat/ice application    [] other:      Other Objective/Functional Measures: see goals below     Pain Level (0-10 scale) Post Treatment: 6/10    ASSESSMENT/Changes in Function:  Patient responded well to today's treatment without any adverse reactions. Patient continues to have multiple medical comorbidities that contribute to her pain and overall limitations in function. However, she continues to make improvements towards goals, including improving strength. Patient would continue to benefit from further skilled PT to further strengthen bilateral LE's and her core stabilization to allow her to continual improve strength for her multiple upcoming surgeries. Patient will continue to benefit from skilled PT services to further modify and progress therapeutic interventions, address functional mobility deficits, address ROM deficits, address strength deficits, analyze and address soft tissue restrictions, analyze and cue movement patterns, analyze and modify body mechanics/ergonomics, assess and modify postural abnormalities, and instruct in home and community integration to attain remaining goals. [x]  See Plan of Care  []  See Progress Note/Recertification  []  See Discharge Summary         Progress Towards Goals/Updated Goals:    Short Term Goals:    to be accomplished within 4 weeks:     Patient will report compliance with prescribed HEP(s) at least 1x/day in order to assist in maximizing therapeutic gains, reduce symptoms, and to progress towards overall prior function.   Eval: HEP to be issued and reviewed with patient within 1-2 visits  Progress note: Patient reports daily compliance with her HEP.    8/25/22, met     Patient will be able to perform TUG test in less than or equal to 12 seconds with [facility] AND [place] in order to demonstrate improved stability during ambulation, thus reducing the patient's risk of falls. Eval: TUG Test: 17 secs with rollator, 17 secs with [facility] AND [place]  Current: TUG Test: 17 secs without AD   7/18/22, in progress  Current:  TUG Test with rollator:  20.5 sec  and 16.6 sec progressing. 8/15/2022  Current: 13 sec with rollator 16 sec with [facility] and SBA    8/17/22, in progress      Patient will be able to perform at least 10 reps of sit <> stands with minimal use of UE's to push from chair during 30\" STS test to demonstrate improved LE strength and stability during this functional activity, thus reducing the patients risk of falls. Eval: 30\" STS: 7 reps with use of moderate use of UE's to push from table  Current: 30\" STS at chair height of 18\" 4 reps with moderate use of UE's to push from chair  7/6/22              Current: Patient had significant difficulty initiating sit to stand; able to complete 3 reps in 30\" at 20\" height  8/2/22, in progress              Current: 8 reps 17 in chair with UE use. 8/17/22, in progress     Long Term Goals:     to be accomplished within 8 weeks:     Patient will score at least 54 points on FOTO in order to maximize function and promote patient satisfaction with overall outcome. ([place] is an established functional score where 100 = no disability)  Eval: 46  Current: 64   7/13/22, met     Patient will report at least a 25% reduction in pain/symptoms when performing ADLs/functional activities in order to improve overall tolerance to functional movements.   Eval: pain ranges from 6-8/10 - bilateral hips, bilateral knees, and bilateral ankles  Current: pain continues to range from 5-8/10 in bilateral hips/knees/ankles; however patient reports improvement in hip pain duration   7/27/22  Current: 5/10 pain \"most of the time\"   8/25/22, met     Patient will demonstrate at least 4/5 strength of bilateral LE's in order to be able to safely perform functional activities and have a decreased risk of falls. Eval: Bilateral LE's grossly 4-/5 except for right knee extension/flexion 4/5, left hip abd 2-/5, right hip abduction 3-/5  Current: Bilateral LE's grossly 4/5 except for right knee extension 4+/5, right knee flexion 4/5, bilateral hip abduction 3-/5    7/18/22  Current:  Right hip flexion: 4/5, knee extension 5/5, dorsiflexion 5/5, hip abduction 3/5, knee flexion 4+/5   LEFT hip flexion 4/5, knee extension 4/5, dorsiflexion 5/5, hip abduction 3/5, and knee flexion 4+/5   8/17/22, in progress     Patient will be able to perform TUG test in less than or equal to 12 seconds without AD in order to demonstrate improved stability during ambulation, thus reducing the patient's risk of falls. Eval: TUG Test: 17 secs with rollator, 17 secs with [facility] AND [place]  Current: TUG Test: 17 secs without AD   7/18/22, in progress  Current:  TUG Test with rollator:  20.5 sec  and 16.6 sec progressing. 8/15/2022  Current: 13 sec with rollator 16 sec with [facility] and SBA    8/17/22, in progress     Patient will be able to perform at least 10 reps of sit <> stands without use of UE's during 30\" STS test to demonstrate improved LE strength and stability during this functional activity, thus reducing the patients risk of falls. Eval: 30\" STS: 7 reps with use of moderate use of UE's to push from table  Current: 30\" STS at chair height of 18\" 4 reps with moderate use of UE's to push from chair  7/6/22  Current: 30\" STS at chair height of 20\":   9 reps without use of UE's   7/18/22  Current: 8 reps 17 in chair with UE use. 8/17/22, in progress    PLAN  []  Upgrade Activities as Tolerated     [x]  Continue Plan of Care  []  Update Interventions per Flow Sheet       []  Discharge Due To:_  []  Other:_      [facility].  Ludy, PT, DPT, ATR  8/25/2022 9:51 AM    Future Appointments   Date Time Provider Noah Gregory   8/25/2022 12:30 PM Mariaa Sharma [facility]   8/26/2022  1:15 PM Eddie Boston [facility]   8/31/2022 12:30 PM [facility]   9/2/2022  1:15 PM Nadine Hand PT [facility]   9/6/2022  1:15 PM [facility]   9/8/2022  1:15 PM [facility]   9/12/2022  1:15 PM Eva Odonnell, PT [facility]   9/14/2022  1:15 PM [facility]

## 2022-08-26 ENCOUNTER — APPOINTMENT (OUTPATIENT)
Dept: PHYSICAL THERAPY | Age: 57
End: 2022-08-26
Payer: MEDICAID

## 2022-08-31 ENCOUNTER — HOSPITAL ENCOUNTER (OUTPATIENT)
Dept: PHYSICAL THERAPY | Age: 57
Discharge: HOME | End: 2022-08-31
Payer: MEDICAID

## 2022-08-31 PROCEDURE — 97110 THERAPEUTIC EXERCISES: CPT

## 2022-08-31 PROCEDURE — 97112 NEUROMUSCULAR REEDUCATION: CPT

## 2022-08-31 PROCEDURE — 97530 THERAPEUTIC ACTIVITIES: CPT

## 2022-09-02 ENCOUNTER — HOSPITAL ENCOUNTER (OUTPATIENT)
Dept: PHYSICAL THERAPY | Age: 57
Discharge: HOME | End: 2022-09-02
Payer: MEDICAID

## 2022-09-02 PROCEDURE — 97110 THERAPEUTIC EXERCISES: CPT

## 2022-09-02 PROCEDURE — 97530 THERAPEUTIC ACTIVITIES: CPT

## 2022-09-02 PROCEDURE — 97112 NEUROMUSCULAR REEDUCATION: CPT

## 2022-09-02 NOTE — PROGRESS NOTES
PT DAILY TREATMENT NOTE    Patient Name: Mamadou Livingston  Date:2022  : 1965  [x]  Patient  Verified  Payor: BLUE CROSS MEDICAID / Plan: [facility] HEALTHKEEPERS PLUS / Product Type: Managed Care Medicaid /    In time:1:23  Out time:2:02  Total Treatment Time (min): 39  Total Timed Codes (min): 39  1:1 Treatment Time ([facility]BS only): 39   Visit #: 23 of 20    Treatment Dx: Other abnormalities of gait and mobility [R26.89]    SUBJECTIVE  Pain Level (0-10 scale): 6/10 Left Knee  Any medication changes, allergies to medications, adverse drug reactions, diagnosis change, or new procedure performed?: [x] No    [] Yes (see summary sheet for update)  Subjective functional status/changes:   [] No changes reported  \"I just have some pain in my knee. \"    OBJECTIVE    19 min Therapeutic Exercise:  [x] See flow sheet :   Rationale: increase ROM, increase strength, and improve coordination to improve the patients ability to return to prior level of physical activity. s    10 min Therapeutic Activity:  [x]  See flow sheet :   Rationale: increase ROM, increase strength, and improve coordination  to improve the patients ability to return to prior level of physical activity. 10 min Neuromuscular Re-education:  [x]  See flow sheet :   Rationale: increase ROM, increase strength, and improve coordination  to improve the patients ability to return to prior level of physical activity. With   [] TE   [] TA   [] neuro   [] other: Patient Education: [x] Review HEP    [] Progressed/Changed HEP based on:   [] positioning   [] body mechanics   [] transfers   [] heat/ice application    [] other:      Other Objective/Functional Measures:      Pain Level (0-10 scale) post treatment: 6/10 Left Knee    ASSESSMENT/Changes in Function: Pt arrived amb with RW, reporting increased knee pain. Pt was able to tolerate standing therex with increased fatigue but, not above tolerance.  Pt reports mild pain in knee during bridges but, also reports greatly increased fatigue in glutes post tx. Pt educated on importance of daily compliance with HEP in order to promote improved strengthening with glutes and improved posture for extended ambulation tolerance. Patient will continue to benefit from skilled PT services to modify and progress therapeutic interventions, address functional mobility deficits, address ROM deficits, address strength deficits, analyze and address soft tissue restrictions, analyze and cue movement patterns, and analyze and modify body mechanics/ergonomics to attain remaining goals. [x]  See Plan of Care  []  See progress note/recertification  []  See Discharge Summary         Progress towards goals / Updated goals:  Short Term Goals:    to be accomplished within 4 weeks:     Patient will report compliance with prescribed HEP(s) at least 1x/day in order to assist in maximizing therapeutic gains, reduce symptoms, and to progress towards overall prior function. Eval: HEP to be issued and reviewed with patient within 1-2 visits  Progress note: Patient reports daily compliance with her HEP.    8/25/22, met     Patient will be able to perform TUG test in less than or equal to 12 seconds with [facility] AND [place] in order to demonstrate improved stability during ambulation, thus reducing the patient's risk of falls. Eval: TUG Test: 17 secs with rollator, 17 secs with [facility] AND [place]  Current: TUG Test: 17 secs without AD   7/18/22, in progress  Current:  TUG Test with rollator:  20.5 sec  and 16.6 sec progressing. 8/15/2022  Current: 13 sec with rollator 16 sec with [facility] and SBA    8/17/22, in progress      Patient will be able to perform at least 10 reps of sit <> stands with minimal use of UE's to push from chair during 30\" STS test to demonstrate improved LE strength and stability during this functional activity, thus reducing the patients risk of falls.   Eval: 30\" STS: 7 reps with use of moderate use of UE's to push from table  Current: 30\" STS at chair height of 18\" 4 reps with moderate use of UE's to push from chair  7/6/22              Current: Patient had significant difficulty initiating sit to stand; able to complete 3 reps in 30\" at 20\" height  8/2/22, in progress              Current: 8 reps 17 in chair with UE use. 8/17/22, in progress     Long Term Goals:     to be accomplished within 8 weeks:     Patient will score at least 54 points on FOTO in order to maximize function and promote patient satisfaction with overall outcome. (Veda Patel is an established functional score where 100 = no disability)  Eval: 46  Current: 64   7/13/22, met     Patient will report at least a 25% reduction in pain/symptoms when performing ADLs/functional activities in order to improve overall tolerance to functional movements. Eval: pain ranges from 6-8/10 - bilateral hips, bilateral knees, and bilateral ankles  Current: pain continues to range from 5-8/10 in bilateral hips/knees/ankles; however patient reports improvement in hip pain duration   7/27/22  Current: 5/10 pain \"most of the time\"   8/25/22, met     Patient will demonstrate at least 4/5 strength of bilateral LE's in order to be able to safely perform functional activities and have a decreased risk of falls. Eval: Bilateral LE's grossly 4-/5 except for right knee extension/flexion 4/5, left hip abd 2-/5, right hip abduction 3-/5  Current: Bilateral LE's grossly 4/5 except for right knee extension 4+/5, right knee flexion 4/5, bilateral hip abduction 3-/5    7/18/22  Current:  Right hip flexion: 4/5, knee extension 5/5, dorsiflexion 5/5, hip abduction 3/5, knee flexion 4+/5   LEFT hip flexion 4/5, knee extension 4/5, dorsiflexion 5/5, hip abduction 3/5, and knee flexion 4+/5   8/17/22, in progress     Patient will be able to perform TUG test in less than or equal to 12 seconds without AD in order to demonstrate improved stability during ambulation, thus reducing the patient's risk of falls.   Eval: TUG Test: 17 secs with rollator, 17 secs with [facility] AND [place]     Last PN: 13 sec with rollator 16 sec with [facility] and SBA    8/17/22, in progress  Current: Pt able to amb with good gait with SPC and perform sit to stand without UE 9/2/22                 Patient will be able to perform at least 10 reps of sit <> stands without use of UE's during 30\" STS test to demonstrate improved LE strength and stability during this functional activity, thus reducing the patients risk of falls. Eval: 30\" STS: 7 reps with use of moderate use of UE's to push from table  Last PN: 8 reps 17 in chair with UE use.   8/17/22, in progress  Current: Continued use of Angel UE with performing general STS 8/31/22    PLAN  [x]  Upgrade activities as tolerated     [x]  Continue plan of care  []  Update interventions per flow sheet       []  Discharge due to:_  []  Other:_      Minerva Kitchen PTA 9/2/2022  8:43 AM    Future Appointments   Date Time Provider Noah Gregory   9/2/2022  1:15 PM [facility]   9/6/2022  1:15 PM [facility]   9/8/2022  1:15 PM [facility]   9/12/2022  1:15 PM Erin Gerard PT [facility]   9/14/2022  1:15 PM Darian Gant PTA [facility]

## 2022-09-06 ENCOUNTER — APPOINTMENT (OUTPATIENT)
Dept: PHYSICAL THERAPY | Age: 57
End: 2022-09-06
Payer: MEDICAID

## 2022-09-07 ENCOUNTER — TELEPHONE (OUTPATIENT)
Dept: PHYSICAL THERAPY | Age: 57
End: 2022-09-07

## 2022-09-08 ENCOUNTER — APPOINTMENT (OUTPATIENT)
Dept: PHYSICAL THERAPY | Age: 57
End: 2022-09-08
Payer: MEDICAID

## 2022-09-12 ENCOUNTER — HOSPITAL ENCOUNTER (OUTPATIENT)
Dept: PHYSICAL THERAPY | Age: 57
Discharge: HOME | End: 2022-09-12
Payer: MEDICAID

## 2022-09-12 PROCEDURE — 97112 NEUROMUSCULAR REEDUCATION: CPT

## 2022-09-12 PROCEDURE — 97530 THERAPEUTIC ACTIVITIES: CPT

## 2022-09-12 PROCEDURE — 97110 THERAPEUTIC EXERCISES: CPT

## 2022-09-12 NOTE — PROGRESS NOTES
PT DAILY TREATMENT NOTE    Patient Name: Mamadou Livingston  Date:2022  : 1965  [x]  Patient  Verified  Payor: BLUE CROSS MEDICAID / Plan: [facility] HEALTHKEEPERS PLUS / Product Type: Managed Care Medicaid /    In time:115  Out time:200  Total Treatment Time (min): 45  Total Timed Codes (min): 45  1:1 Treatment Time ([facility]BS only): 45   Visit #: 20 of 24    Treatment Dx: Other abnormalities of gait and mobility [R26.89]    SUBJECTIVE  Pain Level (0-10 scale): 6/10 right hip  Any medication changes, allergies to medications, adverse drug reactions, diagnosis change, or new procedure performed?: [x] No    [] Yes (see summary sheet for update)  Subjective functional status/changes:   [] No changes reported  Pt reports that her MD is now planing 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      OBJECTIVE    15 min Therapeutic Exercise:  [] See flow sheet :   Rationale: increase ROM, increase strength, improve coordination, improve balance, and increase proprioception to improve the patients ability to restore PLOF      15 min Therapeutic Activity:  []  See flow sheet :   Rationale: increase ROM, increase strength, improve coordination, improve balance, and increase proprioception  to improve the patients ability to complete transfers and ADLs without external assist     15 min Neuromuscular Re-education:  []  See flow sheet :   Rationale: increase ROM, increase strength, improve coordination, improve balance, and increase proprioception  to improve the patients ability to activate ankle and hip stabilizers without compensation          With   [] TE   [] TA   [] neuro   [] other: Patient Education: [x] Review HEP    [] Progressed/Changed HEP based on:   [] positioning   [] body mechanics   [] transfers   [] heat/ice application    [] other:        Pain Level (0-10 scale) post treatment: 6/10 right hip     ASSESSMENT/Changes in Function: Patient tolerated treatment session well today. Patient reports that she has been gone for 1 week visiting family. Pt reports that ortho is scheduling TKA revision in october. Patient continues to make steady progress toward goals and would benefit from continued skilled PT intervention to address remaining deficits outlined in goals below. Patient will continue to benefit from skilled PT services to modify and progress therapeutic interventions, address functional mobility deficits, address ROM deficits, address strength deficits, analyze and address soft tissue restrictions, analyze and cue movement patterns, analyze and modify body mechanics/ergonomics, assess and modify postural abnormalities, address imbalance/dizziness, and instruct in home and community integration to attain remaining goals. [x]  See Plan of Care  []  See progress note/recertification  []  See Discharge Summary         Progress towards goals / Updated goals:  Short Term Goals:    to be accomplished within 4 weeks:     Patient will report compliance with prescribed HEP(s) at least 1x/day in order to assist in maximizing therapeutic gains, reduce symptoms, and to progress towards overall prior function. Eval: HEP to be issued and reviewed with patient within 1-2 visits  Progress note: Patient reports daily compliance with her HEP.    8/25/22, met     Patient will be able to perform TUG test in less than or equal to 12 seconds with [facility] in order to demonstrate improved stability during ambulation, thus reducing the patient's risk of falls. Eval: TUG Test: 17 secs with rollator, 17 secs with [facility]  Current: TUG Test: 17 secs without AD   7/18/22, in progress  Current:  TUG Test with rollator:  20.5 sec  and 16.6 sec progressing.   8/15/2022  Current: 13 sec with rollator 16 sec with [facility] and SBA    8/17/22, in progress      Patient will be able to perform at least 10 reps of sit <> stands with minimal use of UE's to push from chair during 30\" STS test to demonstrate improved LE strength and stability during this functional activity, thus reducing the patients risk of falls. Eval: 30\" STS: 7 reps with use of moderate use of UE's to push from table  Current: 30\" STS at chair height of 18\" 4 reps with moderate use of UE's to push from chair  7/6/22              Current: Patient had significant difficulty initiating sit to stand; able to complete 3 reps in 30\" at 20\" height  8/2/22, in progress              Current: 7 reps 17 in chair with UE use. 9/12/22, in progress     Long Term Goals:     to be accomplished within 8 weeks:     Patient will score at least 54 points on FOTO in order to maximize function and promote patient satisfaction with overall outcome. ([place]arlynn [place] is an established functional score where 100 = no disability)  Eval: 46  Current: 64   7/13/22, met     Patient will report at least a 25% reduction in pain/symptoms when performing ADLs/functional activities in order to improve overall tolerance to functional movements. Eval: pain ranges from 6-8/10 - bilateral hips, bilateral knees, and bilateral ankles  Current: pain continues to range from 5-8/10 in bilateral hips/knees/ankles; however patient reports improvement in hip pain duration   7/27/22  Current: 5/10 pain \"most of the time\"   8/25/22, met     Patient will demonstrate at least 4/5 strength of bilateral LE's in order to be able to safely perform functional activities and have a decreased risk of falls.   Eval: Bilateral LE's grossly 4-/5 except for right knee extension/flexion 4/5, left hip abd 2-/5, right hip abduction 3-/5  Current: Bilateral LE's grossly 4/5 except for right knee extension 4+/5, right knee flexion 4/5, bilateral hip abduction 3-/5    7/18/22  Current:  Right hip flexion: 4/5, knee extension 5/5, dorsiflexion 5/5, hip abduction 3/5, knee flexion 4+/5   LEFT hip flexion 4/5, knee extension 4/5, dorsiflexion 5/5, hip abduction 3/5, and knee flexion 4+/5   8/17/22, in progress     Patient will be able to perform TUG test in less than or equal to 12 seconds without AD in order to demonstrate improved stability during ambulation, thus reducing the patient's risk of falls. Eval: TUG Test: 17 secs with rollator, 17 secs with [facility] AND [place]     Last PN: 13 sec with rollator 16 sec with [facility] and SBA    8/17/22, in progress  Current: Pt able to amb with good gait with SPC and perform sit to stand without UE 9/2/22                 Patient will be able to perform at least 10 reps of sit <> stands without use of UE's during 30\" STS test to demonstrate improved LE strength and stability during this functional activity, thus reducing the patients risk of falls. Eval: 30\" STS: 7 reps with use of moderate use of UE's to push from table  Last PN: 8 reps 17 in chair with UE use.   8/17/22, in progress  Current: Continued use of Angel UE with performing general STS 8/31/22    PLAN  []  Upgrade activities as tolerated     [x]  Continue plan of care  []  Update interventions per flow sheet       []  Discharge due to:_  []  Other:_      Brown Neri PT 9/12/2022  12:24 PM    Future Appointments   Date Time Provider Noah Gregory   9/12/2022  1:15 PM Hemant Guzman PT [facility]   9/14/2022  1:15 PM eLv P[place]on [facility]

## 2022-09-14 ENCOUNTER — HOSPITAL ENCOUNTER (OUTPATIENT)
Dept: PHYSICAL THERAPY | Age: 57
Discharge: HOME | End: 2022-09-14
Payer: MEDICAID

## 2022-09-14 PROCEDURE — 97535 SELF CARE MNGMENT TRAINING: CPT

## 2022-09-14 PROCEDURE — 97112 NEUROMUSCULAR REEDUCATION: CPT

## 2022-09-14 PROCEDURE — 97530 THERAPEUTIC ACTIVITIES: CPT

## 2022-09-14 PROCEDURE — 97110 THERAPEUTIC EXERCISES: CPT

## 2022-09-14 NOTE — PROGRESS NOTES
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 and left ankle fusion , chronic kidney disease though no dialysis, chronic iron-deficiency anemia (her lifetime per patient), heart cardioversion secondary to A-fib (2022), , RA, cataracts, hypertension, gout, \"sensitive stomach\", eczema, morbid obesity  Prior Level of Function: Prior to past 6 months: no AD, could ambulate shorter distances, step-to pattern on steps for years, decreased balance, pain of multiple joints limiting function    Visits from Start of Care: 21    Missed Visits: 1    Goals/Measure of Progress:    Short Term Goals:    to be accomplished within 4 weeks:     Patient will report compliance with prescribed HEP(s) at least 1x/day in order to assist in maximizing therapeutic gains, reduce symptoms, and to progress towards overall prior function. Eval: HEP to be issued and reviewed with patient within 1-2 visits  Progress note: Patient reports daily compliance with her HEP.    22, met     Patient will be able to perform TUG test in less than or equal to 12 seconds with [facility] AND [place] in order to demonstrate improved stability during ambulation, thus reducing the patient's risk of falls.   Eval: TUG Test: 17 secs with rollator, 17 secs with [facility] AND [place]  Last PN: 13 sec with rollator 16 sec with [facility] and SBA 22  Current:       Patient will be able to perform at least 10 reps of sit <> stands with minimal use of UE's to push from chair during 30\" STS test to demonstrate improved LE strength and stability during this functional activity, thus reducing the patients risk of falls. Eval: 30\" STS: 7 reps with use of moderate use of UE's to push from table              Last PN:  8 reps 17 in chair with UE use. 8/17/22              Current: 9 reps 17 in chair with UE use 9/14/22     Long Term Goals:     to be accomplished within 8 weeks:     Patient will score at least 54 points on FOTO in order to maximize function and promote patient satisfaction with overall outcome. (Judeth Barrette is an established functional score where 100 = no disability)  Eval: 46  Current: 64   7/13/22, met     Patient will report at least a 25% reduction in pain/symptoms when performing ADLs/functional activities in order to improve overall tolerance to functional movements. Eval: pain ranges from 6-8/10 - bilateral hips, bilateral knees, and bilateral ankles  Current: pain continues to range from 5-8/10 in bilateral hips/knees/ankles; however patient reports improvement in hip pain duration   7/27/22  Current: 5/10 pain \"most of the time\"   8/25/22, met     Patient will demonstrate at least 4/5 strength of bilateral LE's in order to be able to safely perform functional activities and have a decreased risk of falls.   Eval: Bilateral LE's grossly 4-/5 except for right knee extension/flexion 4/5, left hip abd 2-/5, right hip abduction 3-/5  Current:  Right hip flexion: 4/5, knee extension 5/5, dorsiflexion 5/5, hip abduction 3/5, knee flexion 4+/5   LEFT hip flexion 4/5, knee extension 4/5, dorsiflexion 5/5, hip abduction 3/5, and knee flexion 4+/5 8/17/22  Current: see below 9/14/22    Left 9/14/22 Right 9/14/22   Hip Flexion 5 5   Hip Abd 4- 4-   Hip Add 4+ 4+   Hip Ext 4- 4-   Hip IR 5 5   Hip ER 4+ 4+   Knee Ext 5 5   Knee Flex 5 5            Patient will be able to perform TUG test in less than or equal to 12 seconds without AD in order to demonstrate improved stability during ambulation, thus reducing the patient's risk of falls. Eval: TUG Test: 17 secs with rollator, 17 secs with [facility]     Last PN: 13 sec with rollator 16 sec with [facility] and SBA    8/17/22, in progress  Current: 12 sec with rollator, 13 sec with SPC with SBA 9/14/22                 Patient will be able to perform at least 10 reps of sit <> stands without use of UE's during 30\" STS test to demonstrate improved LE strength and stability during this functional activity, thus reducing the patients risk of falls. Eval: 30\" STS: 7 reps with use of moderate use of UE's to push from table  Last PN: 8 reps 17 in chair with UE use. 8/17/22, in progress  Current: 9 reps 17 in chair with UE use 9/14/22      Key Functional Changes:  Pt has made good progress with rehab since initial visit. Pt has made good improvements with general gait sequencing as well as minor progress towards SPC usage. Pt has improved STS test and TUG test times this visit. Pt continues to slowly improve with LE strength but, continues to demonstrate weakness with bilateral glutes. Pt has since met FOTO goal and has been able to maintain good HEP performance since initial visit. Pt reports continued limitations with bilateral knee pain but, not above tolerance.  Pt will continue to benefit from therapy to address SPC gait stability  as well as to meat all unmet goals established at [facility].     ASSESSMENT/RECOMMENDATIONS:  [x]Continue therapy per initial plan/protocol at a frequency of  2 x per week for 4 weeks  []Continue therapy with the following recommended changes:_____________________ _____________________________ ________________________________________  []Discontinue therapy progressing towards or have reached established goals  []Discontinue therapy due to lack of appreciable progress towards goals  []Discontinue therapy due to lack of attendance or compliance  []Await Physician's recommendations/decisions regarding therapy  []Other:________________________________________________________________    Thank you for this referral.   Maisha Foster, PTA 9/14/2022 2:43 PM    ___________________________________________________________________________________________________________________________________  I certify that the above Therapy Services are being furnished while the patient is under my care. I agree with the treatment plan and certify that this therapy is necessary. [] I have read the above and request that my patient continue as recommended.   [] I have read the above report and request that my patient continue therapy with the following changes/special instructions: _______________________________________  [] I have read the above report and request that my patient be discharged from therapy    Physician's Signature:____________________ Date:_________ TIME:________      Christiano Garrett NP  ** Signature, Date and Time must be completed for valid certification **  Please sign and return to [facility]  [address], [address]    Ph [phone]  Fx [phone]

## 2022-09-14 NOTE — PROGRESS NOTES
PT DAILY TREATMENT NOTE    Patient Name: Ariana Hartmann  Date:2022  : 1965  [x]  Patient  Verified  Payor: BLUE CROSS MEDICAID / Plan: [facility] HEALTHKEEPERS PLUS / Product Type: Managed Care Medicaid /    In time:1:15  Out time:2:25  Total Treatment Time (min): 70  Total Timed Codes (min): 70  1:1 Treatment Time (MC/BCBS only): 60   Visit #: 21 of 24    Treatment Dx: Other abnormalities of gait and mobility [R26.89]    SUBJECTIVE  Pain Level (0-10 scale): 8/10 Right Hip  Any medication changes, allergies to medications, adverse drug reactions, diagnosis change, or new procedure performed?: [x] No    [] Yes (see summary sheet for update)  Subjective functional status/changes:   [] No changes reported  \"I bent over this morning and had a painful pop in my hip. It always happens when I bend over too far. \"    OBJECTIVE    33 min Therapeutic Exercise:  [x] See flow sheet :   Rationale: increase ROM, increase strength, and improve coordination to improve the patients ability to return to prior level of physical activity. 15 min Therapeutic Activity:  [x]  See flow sheet :   Rationale: increase ROM, increase strength, and improve coordination  to improve the patients ability to return to prior level of physical activity. 12 min Neuromuscular Re-education:  [x]  See flow sheet : step up and over sequencing for stair navigation, hurdles for object clearance and SPC sequencing   Rationale: increase ROM, increase strength, and improve coordination  to improve the patients ability to return to prior level of physical activity. 10 min Self Care: Education with [facility] ambulation and proper sequencing with, STS sequencing for home performance and proper utilization of AD for home safety   Rationale:    increase ROM, increase strength, and improve coordination to improve the patients ability to return to prior level of physical activity.            With   [] TE   [] TA   [] neuro   [] other: Patient Education: [x] Review HEP    [] Progressed/Changed HEP based on:   [] positioning   [] body mechanics   [] transfers   [] heat/ice application    [] other:      Other Objective/Functional Measures:      Pain Level (0-10 scale) post treatment: 8/10    ASSESSMENT/Changes in Function: Pt has made good progress with rehab since initial visit. Pt has made good improvements with general gait sequencing as well as minor progress towards SPC usage. Pt has improved STS test and TUG test times this visit. Pt continues to slowly improve with LE strength but, continues to demonstrate weakness with bilateral glutes. Pt has since met FOTO goal and has been able to maintain good HEP performance since initial visit. Pt reports continued limitations with bilateral knee pain but, not above tolerance. Pt will continue to benefit from therapy to address SPC gait stability  as well as to met all unmet goals established at [facility].     Patient will continue to benefit from skilled PT services to modify and progress therapeutic interventions, address functional mobility deficits, address ROM deficits, address strength deficits, analyze and address soft tissue restrictions, analyze and cue movement patterns, and analyze and modify body mechanics/ergonomics to attain remaining goals. [x]  See Plan of Care  []  See progress note/recertification  []  See Discharge Summary         Progress towards goals / Updated goals:  Short Term Goals:    to be accomplished within 4 weeks:     Patient will report compliance with prescribed HEP(s) at least 1x/day in order to assist in maximizing therapeutic gains, reduce symptoms, and to progress towards overall prior function.   Eval: HEP to be issued and reviewed with patient within 1-2 visits  Progress note: Patient reports daily compliance with her HEP.    8/25/22, met     Patient will be able to perform TUG test in less than or equal to 12 seconds with [facility] AND [place] in order to demonstrate improved stability during ambulation, thus reducing the patient's risk of falls. Eval: TUG Test: 17 secs with rollator, 17 secs with [facility] AND [place]  Last PN: 13 sec with rollator 16 sec with [facility] and SBA 8/17/22  Current:       Patient will be able to perform at least 10 reps of sit <> stands with minimal use of UE's to push from chair during 30\" STS test to demonstrate improved LE strength and stability during this functional activity, thus reducing the patients risk of falls. Eval: 30\" STS: 7 reps with use of moderate use of UE's to push from table   Last PN:  8 reps 17 in chair with UE use. 8/17/22              Current: 9 reps 17 in chair with UE use 9/14/22     Long Term Goals:     to be accomplished within 8 weeks:     Patient will score at least 54 points on FOTO in order to maximize function and promote patient satisfaction with overall outcome. (Elke Devin is an established functional score where 100 = no disability)  Eval: 46  Current: 64   7/13/22, met     Patient will report at least a 25% reduction in pain/symptoms when performing ADLs/functional activities in order to improve overall tolerance to functional movements. Eval: pain ranges from 6-8/10 - bilateral hips, bilateral knees, and bilateral ankles  Current: pain continues to range from 5-8/10 in bilateral hips/knees/ankles; however patient reports improvement in hip pain duration   7/27/22  Current: 5/10 pain \"most of the time\"   8/25/22, met     Patient will demonstrate at least 4/5 strength of bilateral LE's in order to be able to safely perform functional activities and have a decreased risk of falls.   Eval: Bilateral LE's grossly 4-/5 except for right knee extension/flexion 4/5, left hip abd 2-/5, right hip abduction 3-/5  Current:  Right hip flexion: 4/5, knee extension 5/5, dorsiflexion 5/5, hip abduction 3/5, knee flexion 4+/5   LEFT hip flexion 4/5, knee extension 4/5, dorsiflexion 5/5, hip abduction 3/5, and knee flexion 4+/5 8/17/22  Current: see below 9/14/22   Left 9/14/22 Right 9/14/22   Hip Flexion 5 5   Hip Abd 4- 4-   Hip Add 4+ 4+   Hip Ext 4- 4-   Hip IR 5 5   Hip ER 4+ 4+   Knee Ext 5 5   Knee Flex 5 5         Patient will be able to perform TUG test in less than or equal to 12 seconds without AD in order to demonstrate improved stability during ambulation, thus reducing the patient's risk of falls. Eval: TUG Test: 17 secs with rollator, 17 secs with [facility] AND [place]     Last PN: 13 sec with rollator 16 sec with [facility] and SBA    8/17/22, in progress  Current: 12 sec with rollator, 13 sec with SPC with SBA 9/14/22                 Patient will be able to perform at least 10 reps of sit <> stands without use of UE's during 30\" STS test to demonstrate improved LE strength and stability during this functional activity, thus reducing the patients risk of falls. Eval: 30\" STS: 7 reps with use of moderate use of UE's to push from table  Last PN: 8 reps 17 in chair with UE use.   8/17/22, in progress  Current: 9 reps 17 in chair with UE use 9/14/22         PLAN  [x]  Upgrade activities as tolerated     [x]  Continue plan of care  []  Update interventions per flow sheet       []  Discharge due to:_  []  Other:_      Colon Squibb, PTA 9/14/2022  10:52 AM    Future Appointments   Date Time Provider Noah Gregory   9/14/2022  1:15 PM [facility]   9/22/2022  2:45 PM [facility]   9/23/2022  2:00 PM [facility]   9/27/2022  3:30 PM Flaquito [place] [facility]   9/30/2022  8:00 AM Sayra Rose PT [facility]   10/4/2022  2:00 PM Soniya Donnelly [facility]   10/6/2022  1:15 PM Deepthi [place] PT [facility]

## 2022-09-22 ENCOUNTER — TELEPHONE (OUTPATIENT)
Dept: PHYSICAL THERAPY | Age: 57
End: 2022-09-22

## 2022-09-22 ENCOUNTER — HOSPITAL ENCOUNTER (OUTPATIENT)
Dept: PHYSICAL THERAPY | Age: 57
End: 2022-09-22
Payer: MEDICAID

## 2022-09-23 ENCOUNTER — HOSPITAL ENCOUNTER (OUTPATIENT)
Dept: PHYSICAL THERAPY | Age: 57
Discharge: HOME | End: 2022-09-23
Payer: MEDICAID

## 2022-09-23 PROCEDURE — 97530 THERAPEUTIC ACTIVITIES: CPT

## 2022-09-23 PROCEDURE — 97110 THERAPEUTIC EXERCISES: CPT

## 2022-09-23 PROCEDURE — 97112 NEUROMUSCULAR REEDUCATION: CPT

## 2022-09-23 NOTE — PROGRESS NOTES
PT DAILY TREATMENT NOTE    Patient Name: Juan Carranza  Date:2022  : 1965  [x]  Patient  Verified  Payor: BLUE CROSS MEDICAID / Plan: [facility] HEALTHKEEPERS PLUS / Product Type: Managed Care Medicaid /    In time:2:03  Out time::2:53  Total Treatment Time (min): 50  Total Timed Codes (min): 50  1:1 Treatment Time (MC/BCBS only): 40   Visit #: 22 of 28    Treatment Dx: Other abnormalities of gait and mobility [R26.89]    SUBJECTIVE  Pain Level (0-10 scale): 8/10  Any medication changes, allergies to medications, adverse drug reactions, diagnosis change, or new procedure performed?: [x] No    [] Yes (see summary sheet for update)  Subjective functional status/changes:   [] No changes reported  \"I drove to [place] every day for the last three days. \"    OBJECTIVE    25 min Therapeutic Exercise:  [x] See flow sheet :   Rationale: increase ROM, increase strength, and improve coordination to improve the patients ability to return to prior level of physical activity. 10 min Therapeutic Activity:  [x]  See flow sheet :   Rationale: increase ROM, increase strength, and improve coordination  to improve the patients ability to return to prior level of physical activity. 15 min Neuromuscular Re-education:  [x]  See flow sheet :   Rationale: increase ROM, increase strength, and improve coordination  to improve the patients ability to return to prior level of physical activity. With   [] TE   [] TA   [] neuro   [] other: Patient Education: [x] Review HEP    [] Progressed/Changed HEP based on:   [] positioning   [] body mechanics   [] transfers   [] heat/ice application    [] other:      Other Objective/Functional Measures:      Pain Level (0-10 scale) post treatment: 8/10    ASSESSMENT/Changes in Function: Pt arrived in clinic reporting high pain in hip at this time. Pt continues to amb with rollator, reporting moderate pain during ambulation.  Pt continues to demonstrate good reciprocal kayy patten with bilateral heel strike but, decreased without UE support though AD. Pt was able to perform NBOS balance activity with no loss of balance and therefor will benefit from addition of Airex for continued progresion of stability in standing. Pt continues to require VC for proper weight shift for lateral step ups in order to avoid ER hip compensation. Pt reported no change in hip pain post tx. Patient will continue to benefit from skilled PT services to modify and progress therapeutic interventions, address functional mobility deficits, address ROM deficits, address strength deficits, analyze and address soft tissue restrictions, analyze and cue movement patterns, and analyze and modify body mechanics/ergonomics to attain remaining goals. [x]  See Plan of Care  []  See progress note/recertification  []  See Discharge Summary         Progress towards goals / Updated goals:  Short Term Goals:    to be accomplished within 4 weeks:     Patient will report compliance with prescribed HEP(s) at least 1x/day in order to assist in maximizing therapeutic gains, reduce symptoms, and to progress towards overall prior function. Eval: HEP to be issued and reviewed with patient within 1-2 visits  Progress note: Patient reports daily compliance with her HEP.    8/25/22, met     Patient will be able to perform TUG test in less than or equal to 12 seconds with [facility] in order to demonstrate improved stability during ambulation, thus reducing the patient's risk of falls. Eval: TUG Test: 17 secs with rollator, 17 secs with [facility]  Last PN: 12 sec with rollator, 13 sec with SPC with SBA 9/14/22  Current:       Patient will be able to perform at least 10 reps of sit <> stands with minimal use of UE's to push from chair during 30\" STS test to demonstrate improved LE strength and stability during this functional activity, thus reducing the patients risk of falls.   Eval: 30\" STS: 7 reps with use of moderate use of UE's to push from table              Last PN:  9 reps 17 in chair with UE use 9/14/22              Current:      Long Term Goals:     to be accomplished within 8 weeks:     Patient will score at least 54 points on FOTO in order to maximize function and promote patient satisfaction with overall outcome. (Richrd Pito is an established functional score where 100 = no disability)  Eval: 46  Current: 64   7/13/22, met     Patient will report at least a 25% reduction in pain/symptoms when performing ADLs/functional activities in order to improve overall tolerance to functional movements. Eval: pain ranges from 6-8/10 - bilateral hips, bilateral knees, and bilateral ankles  Current: pain continues to range from 5-8/10 in bilateral hips/knees/ankles; however patient reports improvement in hip pain duration   7/27/22  Current: 5/10 pain \"most of the time\"   8/25/22, met     Patient will demonstrate at least 4/5 strength of bilateral LE's in order to be able to safely perform functional activities and have a decreased risk of falls. Eval: Bilateral LE's grossly 4-/5 except for right knee extension/flexion 4/5, left hip abd 2-/5, right hip abduction 3-/5  Last PN:   see below 9/14/22    Left 9/14/22 Right 9/14/22   Hip Flexion 5 5   Hip Abd 4- 4-   Hip Add 4+ 4+   Hip Ext 4- 4-   Hip IR 5 5   Hip ER 4+ 4+   Knee Ext 5 5   Knee Flex 5 5   Current: Continued progress of strengthening ex 9/23/22           Patient will be able to perform TUG test in less than or equal to 12 seconds without AD in order to demonstrate improved stability during ambulation, thus reducing the patient's risk of falls.   Eval: TUG Test: 17 secs with rollator, 17 secs with [facility] AND [place]     Last PN: 12 sec with rollator, 13 sec with SPC with SBA 9/14/22  Current:                  Patient will be able to perform at least 10 reps of sit <> stands without use of UE's during 30\" STS test to demonstrate improved LE strength and stability during this functional activity, thus reducing the patients risk of falls.   Eval: 30\" STS: 7 reps with use of moderate use of UE's to push from table  Last PN: 9 reps 17 in chair with UE use 9/14/22  Current:     PLAN  [x]  Upgrade activities as tolerated     [x]  Continue plan of care  []  Update interventions per flow sheet       []  Discharge due to:_  []  Other:_      Raghu Fugn, PTA 9/23/2022  10:22 AM    Future Appointments   Date Time Provider Noah Gregory   9/23/2022  2:00 PM Dinah William [facility]   9/27/2022  3:30 PM Aryan [place] [facility]   9/30/2022  8:00 AM Ian Gordillo [facility]   10/4/2022  2:00 PM Lorena Guzman, [address] [facility]   10/6/2022  1:15 PM Dl [place], [facility]

## 2022-09-27 ENCOUNTER — HOSPITAL ENCOUNTER (OUTPATIENT)
Dept: PHYSICAL THERAPY | Age: 57
End: 2022-09-27
Payer: MEDICAID

## 2022-09-30 ENCOUNTER — HOSPITAL ENCOUNTER (OUTPATIENT)
Dept: PHYSICAL THERAPY | Age: 57
Discharge: HOME | End: 2022-09-30
Payer: MEDICAID

## 2022-09-30 PROCEDURE — 97112 NEUROMUSCULAR REEDUCATION: CPT

## 2022-09-30 PROCEDURE — 97116 GAIT TRAINING THERAPY: CPT

## 2022-09-30 PROCEDURE — 97110 THERAPEUTIC EXERCISES: CPT

## 2022-09-30 PROCEDURE — 97530 THERAPEUTIC ACTIVITIES: CPT

## 2022-09-30 NOTE — PROGRESS NOTES
PT DAILY TREATMENT NOTE    Patient Name: Trina Ruiz  Date:2022  : 1965  [x]  Patient  Verified  Payor: BLUE CROSS MEDICAID / Plan: [facility] HEALTHKEEPERS PLUS / Product Type: Managed Care Medicaid /    In time:8:03  Out time:8:56  Total Treatment Time (min): 53  Total Timed Codes (min): 53  1:1 Treatment Time (MC/BCBS only): 53   Visit #: 23 of 28    Treatment Dx: Other abnormalities of gait and mobility [R26.89]    SUBJECTIVE  Pain Level (0-10 scale): 6/10  Any medication changes, allergies to medications, adverse drug reactions, diagnosis change, or new procedure performed?: [x] No    [] Yes (see summary sheet for update)  Subjective functional status/changes:   [] No changes reported  Patient states she is feeling stiff. She had dental work this week which has relieved pain. She is still waiting on the dentist to release her to proceed with surgery. OBJECTIVE    20 min Therapeutic Exercise:  [x] See flow sheet :   Rationale: increase ROM and increase strength to improve the patients ability to restore PLOF. 15 min Therapeutic Activity:  [x]  See flow sheet :   Rationale: increase strength and improve coordination  to improve the patients ability to complete functional activities. 8 min Neuromuscular Re-education:  [x]  See flow sheet :   Rationale: increase strength, improve coordination, and increase proprioception  to improve the patients ability to activate TA and gluteals with static and dynamic ADLs. 10 min Gait Training:  forward and lateral step ups, precious obstacles (forward and lateral step overs)   Rationale: To improve ambulation safety and efficiency in order to improve patient's ability to safely ambulate at home for self care.          With   [] TE   [] TA   [] neuro   [] other: Patient Education: [x] Review HEP    [] Progressed/Changed HEP based on:   [] positioning   [] body mechanics   [] transfers   [] heat/ice application    [] other:      Other Objective/Functional Measures: see goals     Pain Level (0-10 scale) post treatment: 6/10    ASSESSMENT/Changes in Function: Patient tolerated treatment session well today. Patient continues to use rollator for ambulation. Patient had no complaints with addition of airex to tandem stance and single leg stance balance to exercise program to accomplish increased challenge to LE stability in standing. Added 1.5 pound ankle weight to standing hamstring curls with no increase in pain. Patient had no increase in hip or knee pain completing treatment. Patient continues to make steady progress toward goals and would benefit from continued skilled PT intervention to address remaining deficits outlined in goals below. Patient will continue to benefit from skilled PT services to modify and progress therapeutic interventions, address functional mobility deficits, address ROM deficits, address strength deficits, analyze and address soft tissue restrictions, analyze and cue movement patterns, analyze and modify body mechanics/ergonomics, assess and modify postural abnormalities, address imbalance/dizziness, and instruct in home and community integration to attain remaining goals. [x]  See Plan of Care  []  See progress note/recertification  []  See Discharge Summary         Progress towards goals / Updated goals:  Short Term Goals:    to be accomplished within 4 weeks:     Patient will report compliance with prescribed HEP(s) at least 1x/day in order to assist in maximizing therapeutic gains, reduce symptoms, and to progress towards overall prior function. Eval: HEP to be issued and reviewed with patient within 1-2 visits  Progress note: Patient reports daily compliance with her HEP.    8/25/22, met     Patient will be able to perform TUG test in less than or equal to 12 seconds with [facility] AND [place] in order to demonstrate improved stability during ambulation, thus reducing the patient's risk of falls.   Eval: TUG Test: 17 secs with rollator, 17 secs with [facility] AND [place]  Last PN: 12 sec with rollator, 13 sec with SPC with SBA 9/14/22  Current:       Patient will be able to perform at least 10 reps of sit <> stands with minimal use of UE's to push from chair during 30\" STS test to demonstrate improved LE strength and stability during this functional activity, thus reducing the patients risk of falls. Eval: 30\" STS: 7 reps with use of moderate use of UE's to push from table              Last PN:  9 reps 17 in chair with UE use 9/14/22              Current: 9 reps in chair with UE support no change 9/30/22     Long Term Goals:     to be accomplished within 8 weeks:     Patient will score at least 54 points on FOTO in order to maximize function and promote patient satisfaction with overall outcome. (Noel Hasten is an established functional score where 100 = no disability)  Eval: 46  Current: 64   7/13/22, met     Patient will report at least a 25% reduction in pain/symptoms when performing ADLs/functional activities in order to improve overall tolerance to functional movements. Eval: pain ranges from 6-8/10 - bilateral hips, bilateral knees, and bilateral ankles  Current: pain continues to range from 5-8/10 in bilateral hips/knees/ankles; however patient reports improvement in hip pain duration   7/27/22  Current: 5/10 pain \"most of the time\"   8/25/22, met     Patient will demonstrate at least 4/5 strength of bilateral LE's in order to be able to safely perform functional activities and have a decreased risk of falls.   Eval: Bilateral LE's grossly 4-/5 except for right knee extension/flexion 4/5, left hip abd 2-/5, right hip abduction 3-/5  Last PN:   see below 9/14/22    Left 9/14/22 Right 9/14/22   Hip Flexion 5 5   Hip Abd 4- 4-   Hip Add 4+ 4+   Hip Ext 4- 4-   Hip IR 5 5   Hip ER 4+ 4+   Knee Ext 5 5   Knee Flex 5 5   Current: Continued progress of strengthening ex 9/23/22     Patient will be able to perform TUG test in less than or equal to 12 seconds without AD in order to demonstrate improved stability during ambulation, thus reducing the patient's risk of falls. Eval: TUG Test: 17 secs with rollator, 17 secs with [facility] AND [place]  Last PN: 12 sec with rollator, 13 sec with SPC with SBA 9/14/22  Current:                  Patient will be able to perform at least 10 reps of sit <> stands without use of UE's during 30\" STS test to demonstrate improved LE strength and stability during this functional activity, thus reducing the patients risk of falls.   Eval: 30\" STS: 7 reps with use of moderate use of UE's to push from table  Last PN: 9 reps 17 in chair with UE use 9/14/22  Current:       PLAN  []  Upgrade activities as tolerated     [x]  Continue plan of care  []  Update interventions per flow sheet       []  Discharge due to:_  []  Other:_      Monica Scott, PT 9/30/2022  7:48 AM    Future Appointments   Date Time Provider Noah Gregory   9/30/2022  8:00 AM Stevenson Zaman, [address] [facility]   10/4/2022  2:00 PM Shahrzad Moreno, [address] [facility]   10/6/2022  1:15 PM Mariam Boston, PT [facility]

## 2022-10-04 ENCOUNTER — APPOINTMENT (OUTPATIENT)
Dept: PHYSICAL THERAPY | Age: 57
End: 2022-10-04
Payer: MEDICAID

## 2022-10-04 ENCOUNTER — TELEPHONE (OUTPATIENT)
Dept: PHYSICAL THERAPY | Age: 57
End: 2022-10-04

## 2022-10-05 ENCOUNTER — APPOINTMENT (OUTPATIENT)
Dept: PHYSICAL THERAPY | Age: 57
End: 2022-10-05
Payer: MEDICAID

## 2022-10-06 ENCOUNTER — HOSPITAL ENCOUNTER (OUTPATIENT)
Dept: PHYSICAL THERAPY | Age: 57
End: 2022-10-06
Payer: MEDICAID

## 2022-10-06 ENCOUNTER — TELEPHONE (OUTPATIENT)
Dept: PHYSICAL THERAPY | Age: 57
End: 2022-10-06

## 2022-10-07 ENCOUNTER — APPOINTMENT (OUTPATIENT)
Dept: PHYSICAL THERAPY | Age: 57
End: 2022-10-07
Payer: MEDICAID

## 2022-10-10 ENCOUNTER — TELEPHONE (OUTPATIENT)
Dept: PHYSICAL THERAPY | Age: 57
End: 2022-10-10

## 2022-10-11 ENCOUNTER — APPOINTMENT (OUTPATIENT)
Dept: PHYSICAL THERAPY | Age: 57
End: 2022-10-11
Payer: MEDICAID

## 2022-10-14 ENCOUNTER — TELEPHONE (OUTPATIENT)
Dept: PHYSICAL THERAPY | Age: 57
End: 2022-10-14

## 2022-10-25 ENCOUNTER — HOSPITAL ENCOUNTER (OUTPATIENT)
Dept: PHYSICAL THERAPY | Age: 57
Discharge: HOME | End: 2022-10-25
Payer: MEDICAID

## 2022-10-25 PROCEDURE — 97112 NEUROMUSCULAR REEDUCATION: CPT

## 2022-10-25 PROCEDURE — 97110 THERAPEUTIC EXERCISES: CPT

## 2022-10-25 PROCEDURE — 97530 THERAPEUTIC ACTIVITIES: CPT

## 2022-10-25 NOTE — PROGRESS NOTES
[facility]  [address], [facility], 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 and left ankle fusion , chronic kidney disease though no dialysis, chronic iron-deficiency anemia (her lifetime per patient), heart cardioversion secondary to A-fib (2022), , RA, cataracts, hypertension, gout, \"sensitive stomach\", eczema, morbid obesity  Prior Level of Function: Prior to past 6 months: no AD, could ambulate shorter distances, step-to pattern on steps for years, decreased balance, pain of multiple joints limiting function    Visits from Start of Care: 24    Missed Visits: 5    Reporting Period : 22 to 10/25/22    ASSESSMENT/Changes in Function:  Patient responded well to today's treatment without any adverse reactions. Patient has completed 24 visits of skilled PT for treatment regarding gait instability. During her time in therapy patient made improvements in regards to bilateral LE strength, including now demonstrating functional strength of both legs. Upon starting physical therapy patient was limited to only using her rollator/FWW, but is now able to occasionally ambulating with her [facility] AND [place] when her pain allows. Patient does continue to c/o pain among many bilateral LE joints, which is impacting her overall abilities with functional activities.   She has seen multiple surgeons over the past couple of months, whom have determined she needs bilateral knee replacement revisions as well as a right hip replacement. Patient is educated in and independent with her HEP's that continue to address further strengthening and she is ready to be discharged from skilled PT at this time. Thank you for the referral to [facility]. Progress Towards Goals/Updated Goals:    Short Term Goals:    to be accomplished within 4 weeks:     Patient will report compliance with prescribed HEP(s) at least 1x/day in order to assist in maximizing therapeutic gains, reduce symptoms, and to progress towards overall prior function. Eval: HEP to be issued and reviewed with patient within 1-2 visits  Progress note: Patient reports daily compliance with her HEP.    8/25/22, met     Patient will be able to perform TUG test in less than or equal to 12 seconds with [facility] in order to demonstrate improved stability during ambulation, thus reducing the patient's risk of falls. Eval: TUG Test: 17 secs with rollator, 17 secs with [facility] AND [place]  Last PN: 12 sec with rollator, 13 sec with SPC with SBA 9/14/22  Current: 15 secs with rollator     10/25/22, regression      Patient will be able to perform at least 10 reps of sit <> stands with minimal use of UE's to push from chair during 30\" STS test to demonstrate improved LE strength and stability during this functional activity, thus reducing the patients risk of falls. Eval: 30\" STS: 7 reps with use of moderate use of UE's to push from table              Last PN:  9 reps 17 in chair with UE use 9/14/22              Current: 9 reps in chair with UE support no change 9/30/22    Current: 6.5 reps from chair with UE support to push     10/25/22, regression    Long Term Goals:     to be accomplished within 8 weeks:     Patient will score at least 54 points on FOTO in order to maximize function and promote patient satisfaction with overall outcome.   ([place] is an established functional score where 100 = no disability)  Eval: 46  Current: 64   7/13/22, met Current: 60   10/25/22, met    Patient will report at least a 25% reduction in pain/symptoms when performing ADLs/functional activities in order to improve overall tolerance to functional movements. Eval: pain ranges from 6-8/10 - bilateral hips, bilateral knees, and bilateral ankles  Current: pain continues to range from 5-8/10 in bilateral hips/knees/ankles; however patient reports improvement in hip pain duration   7/27/22  Current: 5/10 pain \"most of the time\"   8/25/22, met     Patient will demonstrate at least 4/5 strength of bilateral LE's in order to be able to safely perform functional activities and have a decreased risk of falls. Eval: Bilateral LE's grossly 4-/5 except for right knee extension/flexion 4/5, left hip abd 2-/5, right hip abduction 3-/5  Last PN:   see below 9/14/22    Left 9/14/22 Right 9/14/22   Hip Flexion 5 5   Hip Abd 4- 4-   Hip Add 4+ 4+   Hip Ext 4- 4-   Hip IR 5 5   Hip ER 4+ 4+   Knee Ext 5 5   Knee Flex 5 5   Current: Bilateral LE's grossly 5/5 except bilateral hip abduction 4/5   10/25/22, met     Patient will be able to perform TUG test in less than or equal to 12 seconds without AD in order to demonstrate improved stability during ambulation, thus reducing the patient's risk of falls. Eval: TUG Test: 17 secs with rollator, 17 secs with [facility] AND [place]  Last PN: 12 sec with rollator, 13 sec with [facility] with SBA 9/14/22  Current: 15 secs with rollator     10/25/22, regression                 Patient will be able to perform at least 10 reps of sit <> stands without use of UE's during 30\" STS test to demonstrate improved LE strength and stability during this functional activity, thus reducing the patients risk of falls.   Eval: 30\" STS: 7 reps with use of moderate use of UE's to push from table              Last PN:  9 reps 17 in chair with UE use 9/14/22              Current: 9 reps in chair with UE support no change 9/30/22    Current: 6.5 reps from chair with UE support to push 10/25/22, regression      RECOMMENDATIONS:  [x]Discontinue therapy: [x]Patient has reached or is progressing toward set goals      []Patient is non-compliant or has abdicated      []Due to lack of appreciable progress towards set goals    Thank you for the referral to [facility]! Larissa Boston, PT  10/25/2022   3:02 PM    ------------------------------------------------------------------------------------------------------------------------  NOTE TO PHYSICIAN:  Please complete the following and fax to: [facility] at [facility] at [phone]  If you are unable to process this request in   24 hours, please contact our office. [] I have read the above report and request that my patient continue therapy with the following changes/special instructions:  [] I have read the above report and request that my patient be discharged from therapy.     [de-identified] Signature:____________________ Date:_________ TIME:________                                        Christie Cardoso NP      ** Signature, Date and Time must be completed for valid certification **

## 2022-10-25 NOTE — PROGRESS NOTES
Physical Therapy Discharge Instructions    [facility]  [address] [place] Jacobson, [address]  Ph [phone]  Fx [phone]      Patient: Maggie Dowell  : 1965      Continue Home Exercise Program 1 times per day for life      Continue with    [] Ice  as needed     [] Heat           Follow up with MD:     [] Upon completion of therapy     [x] As needed      Recommendations:     [x]   Return to activity with home program    [x]   Return to activity with the following modifications:       []Post Rehab Program    [x]Join Independent aquatic program     []Return to/join local gym      Additional Comments: Thank you for choosing [facility]!       Wily Gonzalez 63, PT 10/25/2022 3:06 PM

## 2022-10-25 NOTE — PROGRESS NOTES
PT DAILY TREATMENT NOTE    Patient Name: Arjun Gonsalves  Date: 10/25/2022  : 1965  [x]  Patient  Verified  Payor: BLUE CROSS MEDICAID / Plan: [facility] HEALTHKEEPERS PLUS / Product Type: Managed Care Medicaid /    In Time: 2:02  Out Time: 2:43  Total Treatment Time (min): 41  Total Timed Codes (min): 41  1:1 Treatment Time ( [address] only): 41   Visit #:     Treatment Dx: Other abnormalities of gait and mobility [R26.89]    SUBJECTIVE  Pre-Treatment Pain Level (0-10 scale): 7/10    Any medication changes, allergies to medications, adverse drug reactions, diagnosis change, or new procedure performed?: [x] No    [] Yes (see summary sheet for update)    Subjective Functional Status/Changes:  [] No changes reported  Patient states she is still awaiting cardiac clearance for her upcoming surgeries, she goes to cardiologist on 22. Patient states her doctor's have determined an order for her procedures: first being her left knee replacement revision, followed by right knee replacement revision, and then a right hip replacement. Patient states overall she feels like her knees are \"getting more and more wobbly\" and states her overall bilateral LE pain continues to range from 5-9/10. Patient states she still intermixing between her rollator, FFW, and her [facility] AND [place] secondary to her feeling unsteady because of the bilateral knee \"wobbliness\" so she is scared to go without one of those aforementioned AD's. Patient states she hasn't had any falls since April or May 2022.     OBJECTIVE    23 min Therapeutic Exercise:  [x] See flow sheet   Rationale: increase ROM, increase strength, and decrease pain to assist in improving patient's ability to perform functional activities and ADLs    8 min Therapeutic Activity:  [x] See flow sheet   Rationale: increase ROM, increase strength, and improve coordination to assist in improving patient's ability to perform functional activities and ADLs    12 min Neuromuscular Re-Education:  [x] See flow sheet   Rationale: improve coordination, improve balance/proprioception, improve posture, and improve core stabilization to assist in improving patient's ability to perform functional activities and ADLs as well as to improve core stabilization during static/dynamic movements      With   [x] TE   [] TA   [] neuro   [] other: Patient Education: [x] Review HEP    [] Progressed/Changed HEP based on:   [] positioning   [] body mechanics   [] transfers   [] heat/ice application    [] other:      Other Objective/Functional Measures: see goals below     Pain Level (0-10 scale) Post Treatment: 7/10    ASSESSMENT/Changes in Function:  Patient responded well to today's treatment without any adverse reactions. Patient has completed 24 visits of skilled PT for treatment regarding gait instability. During her time in therapy patient made improvements in regards to bilateral LE strength, including now demonstrating functional strength of both legs. Upon starting physical therapy patient was limited to only using her rollator/FWW, but is now able to occasionally ambulating with her [facility] AND [place] when her pain allows. Patient does continue to c/o pain among many bilateral LE joints, which is impacting her overall abilities with functional activities. She has seen multiple surgeons over the past couple of months, whom have determined she needs bilateral knee replacement revisions as well as a right hip replacement. Patient is educated in and independent with her HEP's that continue to address further strengthening and she is ready to be discharged from skilled PT at this time. Thank you for the referral to [facility].      [x]  See Plan of Care  []  See Progress Note/Recertification  [x]  See Discharge Summary         Progress Towards Goals/Updated Goals:    Short Term Goals:    to be accomplished within 4 weeks:     Patient will report compliance with prescribed HEP(s) at least 1x/day in order to assist in maximizing therapeutic gains, reduce symptoms, and to progress towards overall prior function. Eval: HEP to be issued and reviewed with patient within 1-2 visits  Progress note: Patient reports daily compliance with her HEP.    8/25/22, met     Patient will be able to perform TUG test in less than or equal to 12 seconds with [facility] in order to demonstrate improved stability during ambulation, thus reducing the patient's risk of falls. Eval: TUG Test: 17 secs with rollator, 17 secs with [place] AND [place]  Last PN: 12 sec with rollator, 13 sec with SPC with SBA 9/14/22  Current: 15 secs with rollator     10/25/22, regression      Patient will be able to perform at least 10 reps of sit <> stands with minimal use of UE's to push from chair during 30\" STS test to demonstrate improved LE strength and stability during this functional activity, thus reducing the patients risk of falls. Eval: 30\" STS: 7 reps with use of moderate use of UE's to push from table              Last PN:  9 reps 17 in chair with UE use 9/14/22              Current: 9 reps in chair with UE support no change 9/30/22    Current: 6.5 reps from chair with UE support to push     10/25/22, regression    Long Term Goals:     to be accomplished within 8 weeks:     Patient will score at least 54 points on FOTO in order to maximize function and promote patient satisfaction with overall outcome. (Amira Juanito is an established functional score where 100 = no disability)  Eval: 46  Current: 64   7/13/22, met    Current: 60   10/25/22, met    Patient will report at least a 25% reduction in pain/symptoms when performing ADLs/functional activities in order to improve overall tolerance to functional movements.   Eval: pain ranges from 6-8/10 - bilateral hips, bilateral knees, and bilateral ankles  Current: pain continues to range from 5-8/10 in bilateral hips/knees/ankles; however patient reports improvement in hip pain duration   7/27/22  Current: 5/10 pain \"most of the time\" 8/25/22, met     Patient will demonstrate at least 4/5 strength of bilateral LE's in order to be able to safely perform functional activities and have a decreased risk of falls. Eval: Bilateral LE's grossly 4-/5 except for right knee extension/flexion 4/5, left hip abd 2-/5, right hip abduction 3-/5  Last PN:   see below 9/14/22    Left 9/14/22 Right 9/14/22   Hip Flexion 5 5   Hip Abd 4- 4-   Hip Add 4+ 4+   Hip Ext 4- 4-   Hip IR 5 5   Hip ER 4+ 4+   Knee Ext 5 5   Knee Flex 5 5   Current: Bilateral LE's grossly 5/5 except bilateral hip abduction 4/5   10/25/22, met     Patient will be able to perform TUG test in less than or equal to 12 seconds without AD in order to demonstrate improved stability during ambulation, thus reducing the patient's risk of falls. Eval: TUG Test: 17 secs with rollator, 17 secs with [facility] AND [place]  Last PN: 12 sec with rollator, 13 sec with SPC with SBA 9/14/22  Current: 15 secs with rollator     10/25/22, regression                 Patient will be able to perform at least 10 reps of sit <> stands without use of UE's during 30\" STS test to demonstrate improved LE strength and stability during this functional activity, thus reducing the patients risk of falls. Eval: 30\" STS: 7 reps with use of moderate use of UE's to push from table              Last PN:  9 reps 17 in chair with UE use 9/14/22              Current: 9 reps in chair with UE support no change 9/30/22    Current: 6.5 reps from chair with UE support to push     10/25/22, regression      PLAN  []  Upgrade Activities as Tolerated     []  Continue Plan of Care  []  Update Interventions per Flow Sheet       [x]  Discharge Due To:_ patient has met maximal improvement in skilled PT at this time and is independent with her HEP's  []  Other:_      Heidi Reyes.  Ludy, PT, DPT, ATR  10/25/2022 9:05 AM    Future Appointments   Date Time Provider Noah Gregory   10/25/2022  2:00 PM Heidi Boston, PT [facility]

## 2024-04-18 ENCOUNTER — HOSPITAL ENCOUNTER (OUTPATIENT)
Facility: HOSPITAL | Age: 59
Setting detail: RECURRING SERIES
Discharge: HOME | End: 2024-04-21
Payer: MEDICAID

## 2024-04-18 PROCEDURE — 97161 PT EVAL LOW COMPLEX 20 MIN: CPT

## 2024-04-18 NOTE — PROGRESS NOTES
PHYSICAL THERAPY EVALUATION / DAILY TREATMENT      Patient Name: Christopher Oviedo    Date: 2024    : 1965  Insurance: Payor: [facility] MEDICAID / Plan: VINEET [facility] HEALTHKEEPERS PLUS / Product Type: *No Product type* /      Patient  verified yes     Visit #   Current / Total 1 24   Time   In / Out 12:30 1:15   Pain   In / Out 3 3     TREATMENT AREA =  Right hip pain [M25.551]    SUBJECTIVE:  Chief Complaint/Mechanism of Injury:   Patient is a pleasant 59 y.o. female s/p right hip THR on 24 by a surgeon at [facility].  Patient states she was admitted into the hospital for 4 days post-op, followed by being discharged to home.  Patient states she then had HHPT x 1 visit last week, but she's since been discharged so that she can start outpatient PT.  Patient states she's been doing some exercises at home via the booklet that her surgeon's office gave her.  Patient denies having any known hip precautions.  Patient states she's not having very much pain, stating her average pain currently is about 3/10 pain without any narcotics.  Patient states she does take Tylenol prn, though states this is usually because of her left knee hurting.  Currently, patient is having increased pain and/or difficulty with walking, standing > 45 minutes, sit <> stands, stair negotiations (she does a step-to pattern), curb negotiations, and she doesn't feel like her balance is quite normal particularly when reaching overhead with both hands to hang up clothes.  Patient states she hasn't yet made it into her bed, stating she is sleeping in her recliner.  Patient denies any history of falls, though she has had several stumbles in which she caught herself before falling.  Pre-surgery, patient states she's been using a rollator for about 3 years secondary to bilateral hip, bilateral knee, and her right ankle pain fusion.  Patient has an extensive PMH, including history of bilateral TKR's with need for bilateral

## 2024-04-18 NOTE — PROGRESS NOTES
[facility] [place] [address] [address]  Ph [phone]  Fx [phone]    Plan of Care/ Statement of Necessity for Physical Therapy Services      Patient name: Christopher Oviedo Start of Care: 2024   Referral source: Wallace March PA-C : 1965    Medical Diagnosis: Right hip pain [M25.551]   Onset Date:2020    Treatment Diagnosis: M25.551  RIGHT HIP PAIN      Prior Hospitalization: see medical history Provider#: [id number]   Medications: Verified on Patient summary List   Comorbidities: s/p bilateral TKR's with need for bilateral revisions per patient, s/p right ankle fusion, chronic bilateral hip pain with need for left hip replacement, chronic LBP, morbid obesity, OA, RA, gout, chronic LBP, HTN (on meds), high cholesterol (on meds), CHF since  including cardioversion 2 years ago (on meds), no bloodclot history but is on blood thinner, CKD, parathyroid removal (not on meds), right wrist surgery, hysterectomy (), bladder stress incontinence  Substance Use: [] tobacco use, [] alcohol use, [] other:   Patients Self-Rated Overall Health Status: [] poor, [] fair, [x] good, [] excellent  Number of Falls Within the Past Year: [x] none, []   Prior Level of Function: has used a rollator x 3 years secondary to significant PMH of bilateral hip, bilateral knee, and right ankle pain  [] Unrestricted with functional activities and ADL's  [] No assistive device  [] active lifestyle, [x] moderately active lifestyle, [] sedentary lifestyle  Patients Goals:  \"I'd like to be able to walk without a gait aide and to have my right hip strong enough to have my left hip replaced.\"      The Plan of Care and following information is based on the information from the initial evaluation.  Assessment/ key information: Patient is a pleasant 59 y.o. female presenting to skilled PT s/p right hip THR on 24 by a surgeon at [facility].  Patient states she was admitted into

## 2024-04-22 ENCOUNTER — TELEPHONE (OUTPATIENT)
Facility: HOSPITAL | Age: 59
End: 2024-04-22

## 2024-04-23 ENCOUNTER — APPOINTMENT (OUTPATIENT)
Facility: HOSPITAL | Age: 59
End: 2024-04-23
Payer: MEDICAID

## 2024-04-24 ENCOUNTER — TELEPHONE (OUTPATIENT)
Facility: HOSPITAL | Age: 59
End: 2024-04-24

## 2024-04-25 ENCOUNTER — HOSPITAL ENCOUNTER (OUTPATIENT)
Facility: HOSPITAL | Age: 59
Setting detail: RECURRING SERIES
End: 2024-04-25
Payer: MEDICAID

## 2024-04-26 ENCOUNTER — HOSPITAL ENCOUNTER (OUTPATIENT)
Facility: HOSPITAL | Age: 59
Setting detail: RECURRING SERIES
Discharge: HOME | End: 2024-04-29
Payer: MEDICAID

## 2024-04-26 PROCEDURE — 97530 THERAPEUTIC ACTIVITIES: CPT

## 2024-04-26 PROCEDURE — 97110 THERAPEUTIC EXERCISES: CPT

## 2024-04-26 NOTE — PROGRESS NOTES
PHYSICAL / OCCUPATIONAL THERAPY - DAILY TREATMENT NOTE     Patient Name: Christopher Oviedo    Date: 2024    : 1965  Insurance: Payor: [facility] MEDICAID / Plan: VINEET [facility] HEALTHKEEPERS PLUS / Product Type: *No Product type* /      Patient  verified Yes     Visit #   Current / Total 2 24   Time   In / Out 12:36 1:10   Pain   In / Out 2 bilateral hip, 4/10 bilateral knees 2 bilateral hip, 4/10 bilateral knees   Subjective Functional Status/Changes: Patient states she's having trouble finding rides to therapy because transportation through her insurance hasn't been reliable.   Changes to:  Allergies, Med Hx, Sx Hx?   no       TREATMENT AREA =  Right hip pain [M25.551]    OBJECTIVE    Therapeutic Procedures:  Tx Min Billable or 1:1 Min (if diff from Tx Min) Procedure, Rationale, Specifics   10  65751 Therapeutic Exercise (timed):  increase ROM, strength, coordination, balance, and proprioception to improve patient's ability to progress to PLOF and address remaining functional goals. (see flow sheet as applicable)    Details if applicable:       24  95125 Therapeutic Activity (timed):  use of dynamic activities replicating functional movements to increase ROM, strength, coordination, balance, and proprioception in order to improve patient's ability to progress to PLOF and address remaining functional goals.  (see flow sheet as applicable)    Details if applicable:     34  [facility] Totals Reminder: bill using total billable min of TIMED therapeutic procedures (example: do not include dry needle or estim unattended, both untimed codes, in totals to left)  8-22 min = 1 unit; 23-37 min = 2 units; 38-52 min = 3 units; 53-67 min = 4 units; 68-82 min = 5 units   Total Total     TOTAL TREATMENT TIME:        34     [x]  Patient Education billed concurrently with other procedures  [x] Review HEP    [] Progressed/Changed HEP, detail:    [] Other detail:       Objective Information/Functional

## 2024-04-30 ENCOUNTER — APPOINTMENT (OUTPATIENT)
Facility: HOSPITAL | Age: 59
End: 2024-04-30
Payer: MEDICAID

## 2024-05-01 ENCOUNTER — APPOINTMENT (OUTPATIENT)
Facility: HOSPITAL | Age: 59
End: 2024-05-01
Payer: MEDICAID

## 2024-05-02 ENCOUNTER — APPOINTMENT (OUTPATIENT)
Facility: HOSPITAL | Age: 59
End: 2024-05-02
Payer: MEDICAID

## 2024-05-02 ENCOUNTER — TELEPHONE (OUTPATIENT)
Facility: HOSPITAL | Age: 59
End: 2024-05-02

## 2024-05-06 ENCOUNTER — TELEPHONE (OUTPATIENT)
Facility: HOSPITAL | Age: 59
End: 2024-05-06

## 2024-05-07 ENCOUNTER — APPOINTMENT (OUTPATIENT)
Facility: HOSPITAL | Age: 59
End: 2024-05-07
Payer: MEDICAID

## 2024-05-09 ENCOUNTER — HOSPITAL ENCOUNTER (OUTPATIENT)
Facility: HOSPITAL | Age: 59
Setting detail: RECURRING SERIES
Discharge: HOME | End: 2024-05-12
Payer: MEDICAID

## 2024-05-09 PROCEDURE — 97116 GAIT TRAINING THERAPY: CPT

## 2024-05-09 PROCEDURE — 97110 THERAPEUTIC EXERCISES: CPT

## 2024-05-09 PROCEDURE — 97530 THERAPEUTIC ACTIVITIES: CPT

## 2024-05-09 PROCEDURE — 97112 NEUROMUSCULAR REEDUCATION: CPT

## 2024-05-09 NOTE — PROGRESS NOTES
PHYSICAL / OCCUPATIONAL THERAPY - DAILY TREATMENT NOTE     Patient Name: Christopher Oviedo    Date: 2024    : 1965  Insurance: Payor: [facility] MEDICAID / Plan: VINEET [facility] HEALTHKEEPERS PLUS / Product Type: *No Product type* /      Patient  verified Yes     Visit #   Current / Total 3 24   Time   In / Out 11:15 12:05   Pain   In / Out 0/10 hip, 6/10 left knee 0/10 hip, 4/10 left knee   Subjective Functional Status/Changes: Patient reports her left knee is bothering her today. Patient reports she was able to drive herself to therapy today.     Changes to:  Allergies, Med Hx, Sx Hx?   no       TREATMENT AREA =  Right hip pain [M25.551]    OBJECTIVE  Therapeutic Procedures:  Tx Min Billable or 1:1 Min (if diff from Tx Min) Procedure, Rationale, Specifics   18  53422 Therapeutic Exercise (timed):  increase ROM, strength, coordination, balance, and proprioception to improve patient's ability to progress to PLOF and address remaining functional goals. (see flow sheet as applicable)    Details if applicable:       12  22589 Neuromuscular Re-Education (timed):  improve balance, coordination, kinesthetic sense, posture, core stability and proprioception to improve patient's ability to develop conscious control of individual muscles and awareness of position of extremities in order to progress to PLOF and address remaining functional goals. (see flow sheet as applicable)    Details if applicable:     12  50265 Therapeutic Activity (timed):  use of dynamic activities replicating functional movements to increase ROM, strength, coordination, balance, and proprioception in order to improve patient's ability to progress to PLOF and address remaining functional goals.  (see flow sheet as applicable)     Details if applicable:          8  43398 Gait Training (timed):    170 ft feet with RW (assistive device) over carpeted and tiled surfaces with SBA  level of assist. Cuing for heel to toe gait pattern.  To improve

## 2024-05-14 ENCOUNTER — TELEPHONE (OUTPATIENT)
Facility: HOSPITAL | Age: 59
End: 2024-05-14

## 2024-05-14 ENCOUNTER — HOSPITAL ENCOUNTER (OUTPATIENT)
Facility: HOSPITAL | Age: 59
Setting detail: RECURRING SERIES
End: 2024-05-14
Payer: MEDICAID

## 2024-05-15 ENCOUNTER — HOSPITAL ENCOUNTER (OUTPATIENT)
Facility: HOSPITAL | Age: 59
Setting detail: RECURRING SERIES
Discharge: HOME | End: 2024-05-18
Payer: MEDICAID

## 2024-05-15 PROCEDURE — 97110 THERAPEUTIC EXERCISES: CPT

## 2024-05-15 PROCEDURE — 97112 NEUROMUSCULAR REEDUCATION: CPT

## 2024-05-15 PROCEDURE — 97535 SELF CARE MNGMENT TRAINING: CPT

## 2024-05-15 PROCEDURE — 97530 THERAPEUTIC ACTIVITIES: CPT

## 2024-05-15 NOTE — PROGRESS NOTES
90 degrees of right hip flexion and at least 45 degrees of right hip abduction AROM to improve her ability to squat and ambulate.  Eval: Right Hip AROM: flexion 70 degs, abduction 20 degs     Patient will demonstrate at least 4+/5 strength bilaterally in order to be able to safely perform functional activities and demonstrate improved stability and strength.  Eval: Grossly 5/5 bilaterally except right hip flexion 4-/5, left hip flexion 4+/5, bilateral hip abduction 2-/5 (hip extension not tested)     Patient will be able to perform at least 12 reps of sit <> stands with good form/control during 30\" STS test to demonstrate improved LE strength and stability, thus reducing the patients risk of falls.  Eval: 5 reps, with significant use of arm rests, right LE positioned out in front of her        PLAN  Yes  Continue plan of care  []  Upgrade activities as tolerated  []  Discharge due to :  []  Other:    Lance Davila PTA    5/15/2024    10:34 AM    Future Appointments   Date Time Provider Department Center   5/15/2024  1:50 PM Lance Davila PTA [facility]   5/16/2024 11:10 AM [facility] PT RES CTR [facility]   5/21/2024 10:30 AM Alisia Rodrigues, PT [facility]   5/23/2024 11:10 AM Robert Chavis, PT [facility]   5/28/2024 10:30 AM Alisia Rodrigues, PT [facility]   5/30/2024 11:10 AM Alyssa Hogan, PT [facility]   6/4/2024 11:10 AM Alyssa Hogan, PT [facility]   6/6/2024 11:10 AM Siri Chapman, PT [facility]   6/11/2024 11:10 AM Alyssa Hogan, PT [facility]   6/13/2024 11:10 AM Alyssa Hogan, PT [facility]   6/18/2024 11:10 AM Alyssa Hogan, PT [facility]   6/20/2024 11:10 AM Alyssa Hogan, PT [facility]   6/25/2024 11:10 AM Alyssa oHgan, PT [facility]   6/27/2024 11:10 AM Alisia Rodrigues, PT [facility]   7/2/2024 11:10 AM Alyssa Hogan, PT [facility]   7/5/2024 11:10 AM Alyssa Hogan, PT [facility]   7/9/2024 11:10 AM [facility]

## 2024-05-16 ENCOUNTER — HOSPITAL ENCOUNTER (OUTPATIENT)
Facility: HOSPITAL | Age: 59
Setting detail: RECURRING SERIES
End: 2024-05-16
Payer: MEDICAID

## 2024-05-16 ENCOUNTER — TELEPHONE (OUTPATIENT)
Facility: HOSPITAL | Age: 59
End: 2024-05-16

## 2024-05-21 ENCOUNTER — APPOINTMENT (OUTPATIENT)
Facility: HOSPITAL | Age: 59
End: 2024-05-21
Payer: MEDICAID

## 2024-05-23 ENCOUNTER — HOSPITAL ENCOUNTER (OUTPATIENT)
Facility: HOSPITAL | Age: 59
Setting detail: RECURRING SERIES
Discharge: HOME | End: 2024-05-26
Payer: MEDICAID

## 2024-05-23 PROCEDURE — 97530 THERAPEUTIC ACTIVITIES: CPT

## 2024-05-23 PROCEDURE — 97110 THERAPEUTIC EXERCISES: CPT

## 2024-05-23 PROCEDURE — 97112 NEUROMUSCULAR REEDUCATION: CPT

## 2024-05-23 NOTE — PROGRESS NOTES
PHYSICAL / OCCUPATIONAL THERAPY - DAILY TREATMENT NOTE     Patient Name: Christopher Oviedo    Date: 2024    : 1965  Insurance: Payor: [facility] MEDICAID / Plan: VINEET [facility] HEALTHKEEPERS PLUS / Product Type: *No Product type* /      Patient  verified Yes     Visit #   Current / Total 5 24   Time   In / Out 11:20 AM 12:00 PM   Pain   In / Out 0 0   Subjective Functional Status/Changes: Patient report no pain in hip today. Patient states she had follow up with surgeon and stated everything seems to be healing well.   Changes to:  Allergies, Med Hx, Sx Hx?   no       TREATMENT AREA =  Right hip pain [M25.551]    OBJECTIVE    Therapeutic Procedures:  Tx Min Billable or 1:1 Min (if diff from Tx Min) Procedure, Rationale, Specifics   10  37569 Therapeutic Exercise (timed):  increase ROM, strength, coordination, balance, and proprioception to improve patient's ability to progress to PLOF and address remaining functional goals. (see flow sheet as applicable)    Details if applicable:         83457 Therapeutic Activity (timed):  use of dynamic activities replicating functional movements to increase ROM, strength, coordination, balance, and proprioception in order to improve patient's ability to progress to PLOF and address remaining functional goals.  (see flow sheet as applicable)    Details if applicable:     10  54324 Neuromuscular Re-Education (timed):  improve balance, coordination, kinesthetic sense, posture, core stability and proprioception to improve patient's ability to develop conscious control of individual muscles and awareness of position of extremities in order to progress to PLOF and address remaining functional goals. (see flow sheet as applicable)     Details if applicable:           Details if applicable:            Details if applicable:     40  [facility] Totals Reminder: bill using total billable min of TIMED therapeutic procedures (example: do not include dry needle or estim unattended,

## 2024-05-23 NOTE — PROGRESS NOTES
[facility] at [facility] [place] [address]  Ph [phone]  Fx [phone]    Physical Therapy Progress Note  Patient name: Christopher Oviedo Start of Care: 2024   Referral source: Wallace March PA-C : 1965   Medical/Treatment Diagnosis: Right hip pain [M25.551] Onset Date:2020   Prior Hospitalization: see medical history Provider#: [id number]   Medications: Verified on Patient Summary List    Comorbidities: s/p bilateral TKR's with need for bilateral revisions per patient, s/p right ankle fusion, chronic bilateral hip pain with need for left hip replacement, chronic LBP, morbid obesity, OA, RA, gout, chronic LBP, HTN (on meds), high cholesterol (on meds), CHF since  including cardioversion 2 years ago (on meds), no bloodclot history but is on blood thinner, CKD, parathyroid removal (not on meds), right wrist surgery, hysterectomy (), bladder stress incontinence   Prior Level of Function:has used a rollator x 3 years secondary to significant PMH of bilateral hip, bilateral knee, and right ankle pain  [] Unrestricted with functional activities and ADL's  [] No assistive device  [] active lifestyle, [x] moderately active lifestyle, [] sedentary lifestyle    Visits from Start of Care: 5    Missed Visits: 0    Updated Goals/Measure of Progress:     Short Term Goals:    to be accomplished within 12 treatments:     Patient will be compliant and independent with prescribed HEP(s) in order to assist in maximizing therapeutic gains and improving overall function.  Eval: HEP to be issued and reviewed with patient within 1-2 visits  Current: HEP issued and given to patient today  24  Current PN 2024: Progressing - Patient reports she has been performing some of her HEP but has difficulty due to respiratory issues     Patient will report at least a 25% reduction in right hip pain/symptoms while performing functional activities in order to improve overall

## 2024-05-28 ENCOUNTER — HOSPITAL ENCOUNTER (OUTPATIENT)
Facility: HOSPITAL | Age: 59
Setting detail: RECURRING SERIES
Discharge: HOME | End: 2024-05-31
Payer: MEDICAID

## 2024-05-28 PROCEDURE — 97530 THERAPEUTIC ACTIVITIES: CPT

## 2024-05-28 PROCEDURE — 97112 NEUROMUSCULAR REEDUCATION: CPT

## 2024-05-28 PROCEDURE — 97110 THERAPEUTIC EXERCISES: CPT

## 2024-05-28 NOTE — PROGRESS NOTES
PHYSICAL / OCCUPATIONAL THERAPY - DAILY TREATMENT NOTE     Patient Name: Christopher Oviedo    Date: 2024    : 1965  Insurance: Payor: [facility] MEDICAID / Plan: VINEET [facility] HEALTHKEEPERS PLUS / Product Type: *No Product type* /      Patient  verified Yes     Visit #   Current / Total 6 24   Time   In / Out 10:32 11:10   Pain   In / Out 0 0   Subjective Functional Status/Changes: Patient reports she rested over the weekend   Changes to:  Allergies, Med Hx, Sx Hx?   no       TREATMENT AREA =  Right hip pain [M25.551]    OBJECTIVE    Therapeutic Procedures:  Tx Min Billable or 1:1 Min (if diff from Tx Min) Procedure, Rationale, Specifics   20  78067 Therapeutic Exercise (timed):  increase ROM, strength, coordination, balance, and proprioception to improve patient's ability to progress to PLOF and address remaining functional goals. (see flow sheet as applicable)    Details if applicable:       12  40148 Therapeutic Activity (timed):  use of dynamic activities replicating functional movements to increase ROM, strength, coordination, balance, and proprioception in order to improve patient's ability to progress to PLOF and address remaining functional goals.  (see flow sheet as applicable)    Details if applicable:     8  10867 Neuromuscular Re-Education (timed):  improve balance, coordination, kinesthetic sense, posture, core stability and proprioception to improve patient's ability to develop conscious control of individual muscles and awareness of position of extremities in order to progress to PLOF and address remaining functional goals. (see flow sheet as applicable)     Details if applicable:           Details if applicable:            Details if applicable:     38  [facility] Totals Reminder: bill using total billable min of TIMED therapeutic procedures (example: do not include dry needle or estim unattended, both untimed codes, in totals to left)  8-22 min = 1 unit; 23-37 min = 2 units; 38-52 min = 3

## 2024-05-30 ENCOUNTER — HOSPITAL ENCOUNTER (OUTPATIENT)
Facility: HOSPITAL | Age: 59
Setting detail: RECURRING SERIES
End: 2024-05-30
Payer: MEDICAID

## 2024-05-30 PROCEDURE — 97530 THERAPEUTIC ACTIVITIES: CPT

## 2024-05-30 PROCEDURE — 97110 THERAPEUTIC EXERCISES: CPT

## 2024-05-30 PROCEDURE — 97112 NEUROMUSCULAR REEDUCATION: CPT

## 2024-05-30 NOTE — PROGRESS NOTES
PHYSICAL / OCCUPATIONAL THERAPY - DAILY TREATMENT NOTE     Patient Name: Christopher Oviedo    Date: 2024    : 1965  Insurance: Payor: [facility] MEDICAID / Plan: VINEET [facility] HEALTHKEEPERS PLUS / Product Type: *No Product type* /      Patient  verified Yes     Visit #   Current / Total 7 24   Time   In / Out 11:10 12:01   Pain   In / Out 0 right hip / 7 left knee 0 right hip / 7 left knee   Subjective Functional Status/Changes: \"My right hip feels great, no pain.  But my left knee has been killing me.  I talked to my surgeon the other day because she wants to replace my right knee first, but I don't know if I could handle that, I think I need to have my left knee replaced first.  So she's ordered some more imaging and said she will compare all of that to help make a final decision to see which knee should be completed first.\"   Changes to:  Allergies, Med Hx, Sx Hx?   no       TREATMENT AREA =  Right hip pain [M25.551]    OBJECTIVE    Therapeutic Procedures:  Tx Min Billable or 1:1 Min (if diff from Tx Min) Procedure, Rationale, Specifics   8  05682 Therapeutic Exercise (timed):  increase ROM, strength, coordination, balance, and proprioception to improve patient's ability to progress to PLOF and address remaining functional goals. (see flow sheet as applicable)    Details if applicable:       18  98786 Therapeutic Activity (timed):  use of dynamic activities replicating functional movements to increase ROM, strength, coordination, balance, and proprioception in order to improve patient's ability to progress to PLOF and address remaining functional goals.  (see flow sheet as applicable)    Details if applicable:     15  74835 Neuromuscular Re-Education (timed):  improve balance, coordination, kinesthetic sense, posture, core stability and proprioception to improve patient's ability to develop conscious control of individual muscles and awareness of position of extremities in order to progress to PLOF  [facility]   6/27/2024 11:10 AM Alisia Rodrigues, PT [facility]   7/2/2024 11:10 AM [facility]   7/5/2024 11:10 AM [facility]   7/9/2024 11:10 AM Alyssa Hogan, PT [facility]   7/11/2024 11:10 AM Alyssa Hogan, PT [facility]

## 2024-06-04 ENCOUNTER — TELEPHONE (OUTPATIENT)
Facility: HOSPITAL | Age: 59
End: 2024-06-04

## 2024-06-04 ENCOUNTER — APPOINTMENT (OUTPATIENT)
Facility: HOSPITAL | Age: 59
End: 2024-06-04
Payer: MEDICAID

## 2024-06-05 ENCOUNTER — TELEPHONE (OUTPATIENT)
Facility: HOSPITAL | Age: 59
End: 2024-06-05

## 2024-06-05 NOTE — TELEPHONE ENCOUNTER
Pt no show appointment. PTA called and pt did not answer. PTA left VM informing pt of next appointment.

## 2024-06-06 ENCOUNTER — HOSPITAL ENCOUNTER (OUTPATIENT)
Facility: HOSPITAL | Age: 59
Setting detail: RECURRING SERIES
Discharge: HOME | End: 2024-06-09
Payer: MEDICAID

## 2024-06-06 PROCEDURE — 97530 THERAPEUTIC ACTIVITIES: CPT

## 2024-06-06 PROCEDURE — 97112 NEUROMUSCULAR REEDUCATION: CPT

## 2024-06-06 PROCEDURE — 97110 THERAPEUTIC EXERCISES: CPT

## 2024-06-06 NOTE — PROGRESS NOTES
PHYSICAL / OCCUPATIONAL THERAPY - DAILY TREATMENT NOTE     Patient Name: Christopher Oviedo    Date: 2024    : 1965  Insurance: Payor: [facility] MEDICAID / Plan: VINEET [facility] HEALTHKEEPERS PLUS / Product Type: *No Product type* /      Patient  verified Yes     Visit #   Current / Total 8 24   Time   In / Out 11:09 11:51   Pain   In / Out 0 0   Subjective Functional Status/Changes: Patient reports no right hip pain but still has left knee pain. No increase pain with recent travel and flying.   Changes to:  Allergies, Med Hx, Sx Hx?   no       TREATMENT AREA =  Right hip pain [M25.551]    OBJECTIVE    Therapeutic Procedures:  Tx Min Billable or 1:1 Min (if diff from Tx Min) Procedure, Rationale, Specifics   10  14732 Therapeutic Exercise (timed):  increase ROM, strength, coordination, balance, and proprioception to improve patient's ability to progress to PLOF and address remaining functional goals. (see flow sheet as applicable)    Details if applicable:       17  35095 Neuromuscular Re-Education (timed):  improve balance, coordination, kinesthetic sense, posture, core stability and proprioception to improve patient's ability to develop conscious control of individual muscles and awareness of position of extremities in order to progress to PLOF and address remaining functional goals. (see flow sheet as applicable)    Details if applicable:     15  07170 Therapeutic Activity (timed):  use of dynamic activities replicating functional movements to increase ROM, strength, coordination, balance, and proprioception in order to improve patient's ability to progress to PLOF and address remaining functional goals.  (see flow sheet as applicable)     Details if applicable:           Details if applicable:            Details if applicable:     42  [facility] Totals Reminder: bill using total billable min of TIMED therapeutic procedures (example: do not include dry needle or estim unattended, both untimed codes, in

## 2024-06-11 ENCOUNTER — HOSPITAL ENCOUNTER (OUTPATIENT)
Facility: HOSPITAL | Age: 59
Setting detail: RECURRING SERIES
Discharge: HOME | End: 2024-06-14
Payer: MEDICAID

## 2024-06-11 PROCEDURE — 97535 SELF CARE MNGMENT TRAINING: CPT

## 2024-06-11 PROCEDURE — 97112 NEUROMUSCULAR REEDUCATION: CPT

## 2024-06-11 PROCEDURE — 97110 THERAPEUTIC EXERCISES: CPT

## 2024-06-11 PROCEDURE — 97530 THERAPEUTIC ACTIVITIES: CPT

## 2024-06-11 NOTE — PROGRESS NOTES
Discharge due to :  []  Other:    Alyssa Hogan, PT    6/11/2024    10:25 AM    Future Appointments   Date Time Provider Department Center   6/11/2024 11:10 AM Alyssa Hogan, PT [facility]   6/13/2024 11:10 AM Lance Davila, PTA [facility]   6/18/2024 11:10 AM Alisia Rodrigues, PT [facility]   6/20/2024 11:10 AM Alyssa Hogan, PT [facility]   6/25/2024 11:10 AM Alyssa Hogan, PT [facility]   6/27/2024 11:10 AM Alyssa Hogan, PT [facility]   7/2/2024 11:10 AM Alyssa Hogan, PT [facility]   7/5/2024 11:10 AM Alyssa Hogan, PT [facility]   7/9/2024 11:10 AM Alyssa Hogan, PT [facility]   7/11/2024 11:10 AM Alyssa Hogan, PT [facility]

## 2024-06-13 ENCOUNTER — HOSPITAL ENCOUNTER (OUTPATIENT)
Facility: HOSPITAL | Age: 59
Setting detail: RECURRING SERIES
Discharge: HOME | End: 2024-06-16
Payer: MEDICAID

## 2024-06-13 PROCEDURE — 97530 THERAPEUTIC ACTIVITIES: CPT

## 2024-06-13 PROCEDURE — 97112 NEUROMUSCULAR REEDUCATION: CPT

## 2024-06-13 PROCEDURE — 97110 THERAPEUTIC EXERCISES: CPT

## 2024-06-18 ENCOUNTER — APPOINTMENT (OUTPATIENT)
Facility: HOSPITAL | Age: 59
End: 2024-06-18
Payer: MEDICAID

## 2024-06-18 ENCOUNTER — TELEPHONE (OUTPATIENT)
Facility: HOSPITAL | Age: 59
End: 2024-06-18

## 2024-06-20 ENCOUNTER — HOSPITAL ENCOUNTER (OUTPATIENT)
Facility: HOSPITAL | Age: 59
Setting detail: RECURRING SERIES
Discharge: HOME | End: 2024-06-23
Payer: MEDICAID

## 2024-06-20 PROCEDURE — 97112 NEUROMUSCULAR REEDUCATION: CPT

## 2024-06-20 PROCEDURE — 97530 THERAPEUTIC ACTIVITIES: CPT

## 2024-06-20 PROCEDURE — 97535 SELF CARE MNGMENT TRAINING: CPT

## 2024-06-20 NOTE — PROGRESS NOTES
[facility] at [facility]  [address], [place] [address]  Phone [phone]  Fax [phone]    Physical Therapy Discharge Summary    Patient name: Christopher Oviedo Start of Care: 2024   Referral source: Wallace March PA-C : 1965   Medical/Treatment Diagnosis: Right hip pain [M25.551] Onset Date:2020   Prior Hospitalization: see medical history Provider#: [id number]   Medications: Verified on Patient Summary List     Comorbidities: s/p bilateral TKR's with need for bilateral revisions per patient, s/p right ankle fusion, chronic bilateral hip pain with need for left hip replacement, chronic LBP, morbid obesity, OA, RA, gout, chronic LBP, HTN (on meds), high cholesterol (on meds), CHF since  including cardioversion 2 years ago (on meds), no bloodclot history but is on blood thinner, CKD, parathyroid removal (not on meds), right wrist surgery, hysterectomy (), bladder stress incontinence   Prior Level of Function:has used a rollator x 3 years secondary to significant PMH of bilateral hip, bilateral knee, and right ankle pain  [] Unrestricted with functional activities and ADL's  [] No assistive device  [] active lifestyle, [x] moderately active lifestyle, [] sedentary lifestyle    Visits from Start of Care: 11    Missed Visits: 8    Summary of Care/ Key Functional Changes: Patient has participated in 11 visits of skilled PT for treatment of her right hip s/p THR on 24.  Patient has made continual progress during her course of care, including now having met nearly all goals with the exception of partially meeting TUG test, LE strength, and making no significant change with sit <> stands.  Patient's TUG test has improved from 27\" to 18\" using her FWW, showing that her speed and strength has improved though she does still have a slower gait speed secondary to multiple comorbidities including an upcoming plan of replacing her left hip and left knee.  Patient's

## 2024-06-20 NOTE — PROGRESS NOTES
Physical Therapy Discharge Instructions    [facility]  [address] [place] [address]  Ph [phone]  Fx [phone]      Patient: Christopher Oviedo  : 1965      Continue Home Exercise Program 1 times per day for life      Continue with    [] Ice  as needed     [] Heat           Follow up with MD:     [x] Upon completion of therapy     [] As needed      Recommendations:     [x]   Return to activity with home program    []   Return to activity with the following modifications:       []Post Rehab Program    []Join Independent aquatic program     []Return to/join local gym      Additional Comments: Thank you for choosing [facility]!    Alyssa Hogan, PT 2024 12:03 PM

## 2024-06-20 NOTE — PROGRESS NOTES
PHYSICAL / OCCUPATIONAL THERAPY - DAILY TREATMENT NOTE     Patient Name: Christohper Oviedo    Date: 2024    : 1965  Insurance: Payor: [facility] MEDICAID / Plan: VINEET [facility] HEALTHKEEPERS PLUS / Product Type: *No Product type* /      Patient  verified Yes     Visit #   Current / Total 11 24   Time   In / Out 11:07 11:45   Pain   In / Out 0 hip/5 left knee 0 hip/5 left knee   Subjective Functional Status/Changes: Patient states her hip is completely pain free and she doesn't feel like her hip hinders her in anyway regarding functional activities, just her left hip and left knee do.  Patient states she's been doing her HEP daily and she feels confident on going on her own at this time.  Patient states she just saw her [facility] doctors and they've deferred the decision to decide what surgery is next (left hip or left knee) to her doctors at [facility].  She has an appointment with [facility] on 24.   Changes to:  Allergies, Med Hx, Sx Hx?   no       TREATMENT AREA =  Right hip pain [M25.551]    OBJECTIVE    Therapeutic Procedures:  Tx Min Billable or 1:1 Min (if diff from Tx Min) Procedure, Rationale, Specifics   8  10201 Therapeutic Exercise (timed):  increase ROM, strength, coordination, balance, and proprioception to improve patient's ability to progress to PLOF and address remaining functional goals. (see flow sheet as applicable)    Details if applicable:       12  42419 Therapeutic Activity (timed):  use of dynamic activities replicating functional movements to increase ROM, strength, coordination, balance, and proprioception in order to improve patient's ability to progress to PLOF and address remaining functional goals.  (see flow sheet as applicable)    Details if applicable:     10  02243 Neuromuscular Re-Education (timed):  improve balance, coordination, kinesthetic sense, posture, core stability and proprioception to improve patient's ability to develop conscious control of individual muscles and awareness

## 2024-06-25 ENCOUNTER — APPOINTMENT (OUTPATIENT)
Facility: HOSPITAL | Age: 59
End: 2024-06-25
Payer: MEDICAID

## 2024-06-27 ENCOUNTER — APPOINTMENT (OUTPATIENT)
Facility: HOSPITAL | Age: 59
End: 2024-06-27
Payer: MEDICAID

## 2025-05-01 ENCOUNTER — TELEPHONE (OUTPATIENT)
Facility: HOSPITAL | Age: 60
End: 2025-05-01

## 2025-05-01 ENCOUNTER — HOSPITAL ENCOUNTER (OUTPATIENT)
Facility: HOSPITAL | Age: 60
Setting detail: RECURRING SERIES
Discharge: HOME | End: 2025-05-04
Payer: MEDICAID

## 2025-05-01 PROCEDURE — 97161 PT EVAL LOW COMPLEX 20 MIN: CPT

## 2025-05-01 NOTE — PROGRESS NOTES
PHYSICAL THERAPY EVALUATION / DAILY TREATMENT      Patient Name: Christopher Oviedo    Date: 2025    : 1965  Insurance: Payor: [facility] MEDICAID / Plan: VINEET [facility] HEALTHKEEPERS PLUS / Product Type: *No Product type* /      Patient  verified yes     Visit #   Current / Total 1 24   Time   In / Out 5:20 6:10   Pain   In / Out 6 6     TREATMENT AREA =  Pain in right shoulder [M25.511]  Other chronic pain [G89.29]  Pain in left shoulder [M25.512]    If an interpreting service is utilized for treatment of this patient, the contents of this document represent the material reviewed with the patient via the .     SUBJECTIVE:  Chief Complaint/Mechanism of Injury:   Patient is a very pleasant 60 y.o. female with multiple comorbidities that is well known to this therapist.  Patient is with c/o chronic bilateral shoulder pain (left>right) that has been ongoing for > 3 years due to chronic use of FWW due to multiple bilateral LE comorbidities, including several surgeries.  Patient reports her bilateral shoulder pain has significantly worsened over the last year after having to rely on her UE's more with use of FWW while recovering s/p right THR that was performed in 2024.  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  Patient then underwent left knee prothesis removal with antibiotic spacer replacement, as well as being placed on oral antibiotics.  Patient presented to clinic today NWB in wheelchair (with her  pushing the w/c) with her left knee in a hinged knee brace locked in extension.  Patient states she is to be NWB of left LE per MD as her infection is currently ongoing.  Patient states she does sometimes propel herself in the w/c with use of bilateral arms,

## 2025-05-01 NOTE — TELEPHONE ENCOUNTER
Called patient to confirm she is still coming for her eval today. no answer, LVM requesting call back.

## 2025-05-01 NOTE — PROGRESS NOTES
[facility]  [address] [place] [address]  Ph [phone]  Fx [phone]    Plan of Care/ Statement of Necessity for Physical Therapy Services      Patient name: Christopher Oviedo Start of Care: 2025   Referral source: Alyssa Gamez, APR* : 1965    Medical Diagnosis: Pain in right shoulder [M25.511]  Other chronic pain [G89.29]  Pain in left shoulder [M25.512]   Onset Date:2023    Treatment Diagnosis: M25.511  RIGHT SHOULDER PAIN and M25.512  LEFT SHOULDER PAIN     Prior Hospitalization: see medical history Provider#: [id number]     Comorbidities: Morbid obesity, Musculoskeletal disorders, Social determinants of health: Transportation and Physical activity, and Complications related to surgery, s/p bilateral TKR's with left knee prosthesis removal and antibiotic spacer secondary to knee infection, s/p right ankle fusion, s/ p right THR with revision secondary to infection, chronic bilateral hip pain with need for left hip replacement, chronic LBP, morbid obesity, OA, RA, gout, chronic LBP, HTN (on meds), high cholesterol (on meds), CHF since  including cardioversion 2 years ago (on meds), no bloodclot history but is on blood thinner, CKD, parathyroid removal (not on meds), right wrist surgery, hysterectomy (), bladder stress incontinence, hiatal hernia, sickle cell trait, currently on long-term oral antibiotics due to chronic infections  Substance Use: [] tobacco use, [] alcohol use, [] other:   Patients Self-Rated Overall Health Status: [] poor, [] fair, [x] good, [] excellent  Number of Falls Within the Past Year: [x] none, []   Prior Level of Function: limited for 3+ in all functional activities/ADL's secondary to multiple bilateral LE surgeries/pain  [] Unrestricted with functional activities and ADL's  [] No assistive device  [] active lifestyle, [] moderately active lifestyle, [] sedentary lifestyle  Patients Goals:  \"I would like to be able to

## 2025-05-02 ENCOUNTER — TELEPHONE (OUTPATIENT)
Facility: HOSPITAL | Age: 60
End: 2025-05-02

## 2025-05-05 ENCOUNTER — HOSPITAL ENCOUNTER (OUTPATIENT)
Facility: HOSPITAL | Age: 60
Setting detail: RECURRING SERIES
Discharge: HOME | End: 2025-05-08
Payer: MEDICAID

## 2025-05-05 PROCEDURE — 97112 NEUROMUSCULAR REEDUCATION: CPT

## 2025-05-05 PROCEDURE — 97110 THERAPEUTIC EXERCISES: CPT

## 2025-05-05 PROCEDURE — 97535 SELF CARE MNGMENT TRAINING: CPT

## 2025-05-05 PROCEDURE — 97530 THERAPEUTIC ACTIVITIES: CPT

## 2025-05-05 NOTE — PROGRESS NOTES
of pain post tx but, continued mild shoulder pain noted. Pt will benefit from continued therapy to address unmet goals and to return to prior level of activity.       Patient will continue to benefit from skilled PT / OT services to modify and progress therapeutic interventions, analyze and address functional mobility deficits, analyze and address ROM deficits, analyze and address strength deficits, analyze and address soft tissue restrictions, and analyze and cue for proper movement patterns to address functional deficits and attain remaining goals.    Progress toward goals / Updated goals:  []  See Progress Note/Recertification    Short Term Goals:    to be accomplished within 12 treatments:     Patient will be compliant and independent with prescribed HEP(s) in order to assist in maximizing therapeutic gains and improving overall function.  Eval: HEP to be issued and reviewed with patient within 1-2 visits  Current: Distributed on visit 2 5/5/25     Patient will report at least a 25% reduction in pain/symptoms while performing functional activities in order to improve overall tolerance to functional movements and progress towards PLOF.  Eval: 6/10 pain at worst, but an average daily pain of 4-6/10     Patient will be able to raise a fork to her mouth using right UE to be able to return to independently feeding herself.              Eval: unable to raise fork to mouth using right UE due to shoulder pain        Long Term Goals:     to be accomplished within 24 treatments:     Patient will score 50% or lower on QuickDASH to meet MCID and show improvement with functional activities and ADL's.              Scoring:           MCID = 19%                                        41-60% = moderate difficulty                                      0-40% = mild to no difficulty                > 61% = severe difficulty              Eval: 72.73% on QuickDASH      Patient will report an average daily pain of 2/10 or less during

## 2025-05-08 ENCOUNTER — TELEPHONE (OUTPATIENT)
Facility: HOSPITAL | Age: 60
End: 2025-05-08

## 2025-05-08 NOTE — TELEPHONE ENCOUNTER
Called provider's clinic, was routed to pt appointment services, spoke to Moon and will be sending a message to provider's clinic about the OT script that needs to be signed by referring provider.

## 2025-05-09 ENCOUNTER — TELEPHONE (OUTPATIENT)
Facility: HOSPITAL | Age: 60
End: 2025-05-09

## 2025-05-09 NOTE — TELEPHONE ENCOUNTER
pt cxl>24 due to daughters work s/c changing (pt relies on [facility] for transportation) & r/s to later in the week

## 2025-05-12 ENCOUNTER — APPOINTMENT (OUTPATIENT)
Facility: HOSPITAL | Age: 60
End: 2025-05-12
Payer: MEDICAID

## 2025-05-14 ENCOUNTER — HOSPITAL ENCOUNTER (OUTPATIENT)
Facility: HOSPITAL | Age: 60
Setting detail: RECURRING SERIES
End: 2025-05-14
Payer: MEDICAID

## 2025-05-14 ENCOUNTER — TELEPHONE (OUTPATIENT)
Facility: HOSPITAL | Age: 60
End: 2025-05-14

## 2025-05-15 ENCOUNTER — APPOINTMENT (OUTPATIENT)
Facility: HOSPITAL | Age: 60
End: 2025-05-15
Payer: MEDICAID

## 2025-05-20 ENCOUNTER — TELEPHONE (OUTPATIENT)
Facility: HOSPITAL | Age: 60
End: 2025-05-20

## 2025-05-20 ENCOUNTER — HOSPITAL ENCOUNTER (OUTPATIENT)
Facility: HOSPITAL | Age: 60
Setting detail: RECURRING SERIES
Discharge: HOME | End: 2025-05-23
Payer: MEDICAID

## 2025-05-20 PROCEDURE — 97110 THERAPEUTIC EXERCISES: CPT

## 2025-05-20 PROCEDURE — 97530 THERAPEUTIC ACTIVITIES: CPT

## 2025-05-20 PROCEDURE — 97112 NEUROMUSCULAR REEDUCATION: CPT

## 2025-05-20 NOTE — PROGRESS NOTES
PHYSICAL / OCCUPATIONAL THERAPY - DAILY TREATMENT NOTE     Patient Name: Christopher Oviedo    Date: 2025    : 1965  Insurance: Payor: [facility] MEDICAID / Plan: VINEET [facility] HEALTHKEEPERS PLUS / Product Type: *No Product type* /      Patient  verified Yes     Visit #   Current / Total 3 24   Time   In / Out 2:26 3:08   Pain   In / Out 5 5   Subjective Functional Status/Changes: Just frustrated with not being able to do what I want to do yet   Changes to:  Allergies, Med Hx, Sx Hx?   no       TREATMENT AREA =  Pain in right shoulder [M25.511]  Other chronic pain [G89.29]  Pain in left shoulder [M25.512]    If an interpreting service is utilized for treatment of this patient, the contents of this document represent the material reviewed with the patient via the .     OBJECTIVE    Therapeutic Procedures:  Tx Min Billable or 1:1 Min (if diff from Tx Min) Procedure, Rationale, Specifics   20  66523 Therapeutic Exercise (timed):  increase ROM, strength, coordination, balance, and proprioception to improve patient's ability to progress to PLOF and address remaining functional goals. (see flow sheet as applicable)    Details if applicable:       11  85229 Neuromuscular Re-Education (timed):  improve balance, coordination, kinesthetic sense, posture, core stability and proprioception to improve patient's ability to develop conscious control of individual muscles and awareness of position of extremities in order to progress to PLOF and address remaining functional goals. (see flow sheet as applicable)    Details if applicable:     11  19091 Therapeutic Activity (timed):  use of dynamic activities replicating functional movements to increase ROM, strength, coordination, balance, and proprioception in order to improve patient's ability to progress to PLOF and address remaining functional goals.  (see flow sheet as applicable)     Details if applicable:     42  [facility] Totals Reminder: bill using

## 2025-05-23 ENCOUNTER — HOSPITAL ENCOUNTER (OUTPATIENT)
Facility: HOSPITAL | Age: 60
Setting detail: RECURRING SERIES
End: 2025-05-23
Payer: MEDICAID

## 2025-05-23 NOTE — PROGRESS NOTES
PHYSICAL / OCCUPATIONAL THERAPY - DAILY TREATMENT NOTE     Patient Name: Christopher Oviedo    Date: 2025    : 1965  Insurance: Payor: [facility] MEDICAID / Plan: VINEET [facility] HEALTHKEEPERS PLUS / Product Type: *No Product type* /      Patient  verified Yes     Visit #   Current / Total 4 24   Time   In / Out *** ***   Pain   In / Out *** ***   Subjective Functional Status/Changes: ***   Changes to:  Allergies, Med Hx, Sx Hx?   no       TREATMENT AREA =  Pain in right shoulder [M25.511]  Other chronic pain [G89.29]  Pain in left shoulder [M25.512]    If an interpreting service is utilized for treatment of this patient, the contents of this document represent the material reviewed with the patient via the .     OBJECTIVE    Modalities Rationale:     decrease edema, decrease inflammation, and decrease pain to improve patient's ability to progress to PLOF and address remaining functional goals.     min [] Estim Unattended, type/location:                                      []  w/ice    []  w/heat    min [] Estim Attended, type/location:                                     []  w/US     []  w/ice    []  w/heat    []  TENS insruct      min []  Mechanical Traction: type/lbs                   []  pro   []  sup   []  int   []  cont    []  before manual    []  after manual    min []  Ultrasound, settings/location:      min []  Iontophoresis w/ dexamethasone, location:                                               []  take home patch       []  in clinic        min  unbilled []  Ice     []  Heat    location/position:     min []  Paraffin,  details:     min []  Vasopneumatic Device, press/temp:     min []  Whirlpool / Fluido:    If using vaso (only need to measure limb vaso being performed on)      pre-treatment girth :       post-treatment girth :       measured at (landmark location) :      min []  Other:    Skin assessment post-treatment (if applicable):    [x]  intact    []  redness- no adverse

## 2025-05-27 ENCOUNTER — TELEPHONE (OUTPATIENT)
Facility: HOSPITAL | Age: 60
End: 2025-05-27

## 2025-05-29 ENCOUNTER — HOSPITAL ENCOUNTER (OUTPATIENT)
Facility: HOSPITAL | Age: 60
Setting detail: RECURRING SERIES
End: 2025-05-29
Payer: MEDICAID

## 2025-05-29 ENCOUNTER — TELEPHONE (OUTPATIENT)
Facility: HOSPITAL | Age: 60
End: 2025-05-29

## 2025-05-29 NOTE — PROGRESS NOTES
[facility]  [address] [place] [address]  Ph [phone]  Fx [phone]    Physical Therapy Progress Note  Patient name: Christopher Oviedo Start of Care: ***   Referral source: Alyssa Gamez, APR* : 1965   Medical/Treatment Diagnosis: Chronic pain of both shoulders  Bilateral hand pain  Pain in right shoulder  Pain in left shoulder Onset Date:***   Prior Hospitalization: see medical history Provider#: [id number]   Comorbidities: Morbid obesity, Musculoskeletal disorders, Social determinants of health: Transportation and Physical activity, and Complications related to surgery, s/p bilateral TKR's with left knee prosthesis removal and antibiotic spacer secondary to knee infection, s/p right ankle fusion, s/ p right THR with revision secondary to infection, chronic bilateral hip pain with need for left hip replacement, chronic LBP, morbid obesity, OA, RA, gout, chronic LBP, HTN (on meds), high cholesterol (on meds), CHF since  including cardioversion 2 years ago (on meds), no bloodclot history but is on blood thinner, CKD, parathyroid removal (not on meds), right wrist surgery, hysterectomy (), bladder stress incontinence, hiatal hernia, sickle cell trait, currently on long-term oral antibiotics due to chronic infections   Prior Level of Function:limited for 3+ in all functional activities/ADL's secondary to multiple bilateral LE surgeries/pain  [] Unrestricted with functional activities and ADL's  [] No assistive device  [] active lifestyle, [] moderately active lifestyle, [] sedentary lifestyle    Reporting Period: 25-25    Visits from Start of Care: 4    Missed Visits: 4    Updated Goals/Measure of Progress:   ***    Summary of Care/ Key Functional Changes: ***    ASSESSMENT/RECOMMENDATIONS: ***  Patient would benefit from the continuation of skilled rehab interventions for functional progress to achieving above stated clinically significant goals.

## 2025-05-29 NOTE — TELEPHONE ENCOUNTER
Pt now show appt. PTA called and left voicemail informing pt of no further appointments scheduled at this time. PTA informed pt that they would have to be discharged if pt did not contact office in the furture.

## 2025-05-29 NOTE — PROGRESS NOTES
PHYSICAL / OCCUPATIONAL THERAPY - DAILY TREATMENT NOTE     Patient Name: Christopher Oviedo    Date: 2025    : 1965  Insurance: Payor: [facility] MEDICAID / Plan: VINEET [facility] HEALTHKEEPERS PLUS / Product Type: *No Product type* /      Patient  verified Yes     Visit #   Current / Total 4 24   Time   In / Out *** ***   Pain   In / Out *** ***   Subjective Functional Status/Changes: ***   Changes to:  Allergies, Med Hx, Sx Hx?   no       TREATMENT AREA =  Pain in right shoulder [M25.511]  Other chronic pain [G89.29]  Pain in left shoulder [M25.512]    If an interpreting service is utilized for treatment of this patient, the contents of this document represent the material reviewed with the patient via the .     OBJECTIVE    Modalities Rationale:     decrease edema, decrease inflammation, and decrease pain to improve patient's ability to progress to PLOF and address remaining functional goals.     min [] Estim Unattended, type/location:                                      []  w/ice    []  w/heat    min [] Estim Attended, type/location:                                     []  w/US     []  w/ice    []  w/heat    []  TENS insruct      min []  Mechanical Traction: type/lbs                   []  pro   []  sup   []  int   []  cont    []  before manual    []  after manual    min []  Ultrasound, settings/location:      min []  Iontophoresis w/ dexamethasone, location:                                               []  take home patch       []  in clinic        min  unbilled []  Ice     []  Heat    location/position:     min []  Paraffin,  details:     min []  Vasopneumatic Device, press/temp:     min []  Whirlpool / Fluido:    If using vaso (only need to measure limb vaso being performed on)      pre-treatment girth :       post-treatment girth :       measured at (landmark location) :      min []  Other:    Skin assessment post-treatment (if applicable):    [x]  intact    []  redness- no adverse

## 2025-06-10 ENCOUNTER — TELEPHONE (OUTPATIENT)
Facility: HOSPITAL | Age: 60
End: 2025-06-10

## 2025-06-10 NOTE — TELEPHONE ENCOUNTER
Left voice mail stating: \"Please call clinic on or before 6/24/2025] to discuss continuation of care.\"